# Patient Record
Sex: FEMALE | Race: WHITE | NOT HISPANIC OR LATINO | Employment: OTHER | URBAN - METROPOLITAN AREA
[De-identification: names, ages, dates, MRNs, and addresses within clinical notes are randomized per-mention and may not be internally consistent; named-entity substitution may affect disease eponyms.]

---

## 2017-02-28 ENCOUNTER — HOSPITAL ENCOUNTER (EMERGENCY)
Facility: HOSPITAL | Age: 52
Discharge: HOME | End: 2017-02-28
Attending: EMERGENCY MEDICINE | Admitting: EMERGENCY MEDICINE

## 2017-02-28 ENCOUNTER — APPOINTMENT (OUTPATIENT)
Dept: CT IMAGING | Facility: HOSPITAL | Age: 52
End: 2017-02-28

## 2017-02-28 VITALS
DIASTOLIC BLOOD PRESSURE: 86 MMHG | WEIGHT: 200 LBS | TEMPERATURE: 98.1 F | SYSTOLIC BLOOD PRESSURE: 147 MMHG | RESPIRATION RATE: 18 BRPM | HEART RATE: 81 BPM | BODY MASS INDEX: 33.32 KG/M2 | HEIGHT: 65 IN | OXYGEN SATURATION: 94 %

## 2017-02-28 DIAGNOSIS — R51.9 ACUTE NONINTRACTABLE HEADACHE, UNSPECIFIED HEADACHE TYPE: Primary | ICD-10-CM

## 2017-02-28 LAB — TROPONIN I SERPL-MCNC: 0 NG/ML (ref 0–0.07)

## 2017-02-28 PROCEDURE — 25010000002 METOCLOPRAMIDE PER 10 MG: Performed by: EMERGENCY MEDICINE

## 2017-02-28 PROCEDURE — 25010000002 HYDROMORPHONE PER 4 MG

## 2017-02-28 PROCEDURE — 84484 ASSAY OF TROPONIN QUANT: CPT

## 2017-02-28 PROCEDURE — 70450 CT HEAD/BRAIN W/O DYE: CPT

## 2017-02-28 PROCEDURE — 96375 TX/PRO/DX INJ NEW DRUG ADDON: CPT

## 2017-02-28 PROCEDURE — 96361 HYDRATE IV INFUSION ADD-ON: CPT

## 2017-02-28 PROCEDURE — 96374 THER/PROPH/DIAG INJ IV PUSH: CPT

## 2017-02-28 PROCEDURE — 99284 EMERGENCY DEPT VISIT MOD MDM: CPT

## 2017-02-28 PROCEDURE — 25010000002 DIPHENHYDRAMINE PER 50 MG: Performed by: EMERGENCY MEDICINE

## 2017-02-28 PROCEDURE — 25010000002 ONDANSETRON PER 1 MG: Performed by: EMERGENCY MEDICINE

## 2017-02-28 RX ORDER — METOCLOPRAMIDE HYDROCHLORIDE 5 MG/ML
10 INJECTION INTRAMUSCULAR; INTRAVENOUS ONCE
Status: COMPLETED | OUTPATIENT
Start: 2017-02-28 | End: 2017-02-28

## 2017-02-28 RX ORDER — SODIUM CHLORIDE 0.9 % (FLUSH) 0.9 %
10 SYRINGE (ML) INJECTION AS NEEDED
Status: DISCONTINUED | OUTPATIENT
Start: 2017-02-28 | End: 2017-03-01 | Stop reason: HOSPADM

## 2017-02-28 RX ORDER — HYDROMORPHONE HYDROCHLORIDE 1 MG/ML
0.5 INJECTION, SOLUTION INTRAMUSCULAR; INTRAVENOUS; SUBCUTANEOUS ONCE
Status: COMPLETED | OUTPATIENT
Start: 2017-02-28 | End: 2017-02-28

## 2017-02-28 RX ORDER — ONDANSETRON 2 MG/ML
4 INJECTION INTRAMUSCULAR; INTRAVENOUS ONCE
Status: COMPLETED | OUTPATIENT
Start: 2017-02-28 | End: 2017-02-28

## 2017-02-28 RX ORDER — BUTALBITAL, ACETAMINOPHEN AND CAFFEINE 50; 325; 40 MG/1; MG/1; MG/1
1 TABLET ORAL EVERY 6 HOURS PRN
Qty: 24 TABLET | Refills: 0 | Status: SHIPPED | OUTPATIENT
Start: 2017-02-28 | End: 2018-06-07 | Stop reason: HOSPADM

## 2017-02-28 RX ORDER — DIPHENHYDRAMINE HYDROCHLORIDE 50 MG/ML
25 INJECTION INTRAMUSCULAR; INTRAVENOUS ONCE
Status: COMPLETED | OUTPATIENT
Start: 2017-02-28 | End: 2017-02-28

## 2017-02-28 RX ORDER — ONDANSETRON 8 MG/1
8 TABLET, ORALLY DISINTEGRATING ORAL EVERY 8 HOURS PRN
Qty: 16 TABLET | Refills: 0 | Status: SHIPPED | OUTPATIENT
Start: 2017-02-28

## 2017-02-28 RX ADMIN — DIPHENHYDRAMINE HYDROCHLORIDE 25 MG: 50 INJECTION INTRAMUSCULAR; INTRAVENOUS at 21:00

## 2017-02-28 RX ADMIN — ONDANSETRON 4 MG: 2 INJECTION INTRAMUSCULAR; INTRAVENOUS at 22:40

## 2017-02-28 RX ADMIN — SODIUM CHLORIDE 1000 ML: 9 INJECTION, SOLUTION INTRAVENOUS at 21:00

## 2017-02-28 RX ADMIN — HYDROMORPHONE HYDROCHLORIDE 0.5 MG: 1 INJECTION, SOLUTION INTRAMUSCULAR; INTRAVENOUS; SUBCUTANEOUS at 22:40

## 2017-02-28 RX ADMIN — METOCLOPRAMIDE 10 MG: 5 INJECTION, SOLUTION INTRAMUSCULAR; INTRAVENOUS at 21:02

## 2017-03-01 LAB
HOLD SPECIMEN: NORMAL
HOLD SPECIMEN: NORMAL
WHOLE BLOOD HOLD SPECIMEN: NORMAL
WHOLE BLOOD HOLD SPECIMEN: NORMAL

## 2017-03-01 NOTE — ED PROVIDER NOTES
Subjective   HPI Comments: PT WITH HX OF MIGRAINES C/O GRADUAL WORSENING HEADACHE THAT STARTED THIS PM. PT STATES THAT IT STARTED MILD AT 6 PM WHICH THEN IMPROVED. HER BP STARTED TO INCREASE AFTER THE HEADACHE WORSENED. SHE STATES THAT SHE HAS HAD NAUSEA AND VOMITING AS WELL. PT DENIES ANY FEVER, CHILLS, NECK PAIN OR CO ALARMS GOING OFF AT HER HOME. HER  IS SYMPTOM FREE. SHE HAS NO OTHER COMPLAINTS.     Patient is a 51 y.o. female presenting with headaches.   History provided by:  Patient  Headache   Pain location:  L parietal  Quality:  Dull  Radiates to:  Does not radiate  Severity currently:  Unable to specify  Severity at highest:  Unable to specify  Onset quality:  Gradual  Timing:  Constant  Chronicity:  New  Context: not activity        Review of Systems   Neurological: Positive for headaches.   All other systems reviewed and are negative.      Past Medical History   Diagnosis Date   • Anxiety    • Arthritis    • Depression    • GERD (gastroesophageal reflux disease)    • History of transfusion      2 UNITS   • Hypertension    • Migraine    • PONV (postoperative nausea and vomiting)    • Wears eyeglasses        No Known Allergies    Past Surgical History   Procedure Laterality Date   • Dilatation and curettage     • Hysterectomy     • Tonsillectomy     • Back surgery     • Rotator cuff repair       bilateral   • Gastric bypass     • Colonoscopy     • Endoscopy     • Total hip arthroplasty Left 10/4/2016     Procedure: TOTAL HIP ARTHROPLASTY LEFT;  Surgeon: Arvind Oh MD;  [facility];  Service:        History reviewed. No pertinent family history.    Social History     Social History   • Marital status:      Spouse name: N/A   • Number of children: N/A   • Years of education: N/A     Social History Main Topics   • Smoking status: Never Smoker   • Smokeless tobacco: Never Used   • Alcohol use No   • Drug use: No   • Sexual activity: Defer     Other Topics Concern   • None      Social History Narrative           Objective   Physical Exam   Constitutional: She appears well-developed and well-nourished.   HENT:   Head: Normocephalic and atraumatic.   Eyes: Conjunctivae are normal.   Neck: Normal range of motion. Neck supple.   Cardiovascular: Normal rate, regular rhythm and normal heart sounds.    Pulmonary/Chest: Effort normal and breath sounds normal. No respiratory distress.   Abdominal: Soft.   Musculoskeletal: Normal range of motion. She exhibits no edema.   Neurological: She is alert.   Skin: Skin is warm and dry.   Psychiatric: Her behavior is normal. Judgment and thought content normal.   Nursing note and vitals reviewed.      Procedures         ED Course  ED Course   Comment By Time   PT MUCH BETTER AFTER TREATMENT. PT AGREES WITH PLAN AND HAS NO OTHER QUESTION. MALIA Rodriguez 02/28 2225                No results found for this or any previous visit (from the past 24 hour(s)).  Note: In addition to lab results from this visit, the labs listed above may include labs taken at another facility or during a different encounter within the last 24 hours. Please correlate lab times with ED admission and discharge times for further clarification of the services performed during this visit.    CT Head Without Contrast   Final Result   Abnormal   1.  No acute abnormality.   2.  Mild right maxillary sinus disease, no acute sinusitis.      THIS DOCUMENT HAS BEEN ELECTRONICALLY SIGNED BY MAHAD SENIOR MD        Vitals:    02/28/17 2028 02/28/17 2205 02/28/17 2214 02/28/17 2246   BP: (!) 166/116 146/95  147/86   BP Location: Right arm   Right arm   Patient Position: Lying   Lying   Pulse:   80 81   Resp:   17 18   Temp:       TempSrc:       SpO2:   96% 94%   Weight:       Height:         Medications   sodium chloride 0.9 % bolus 1,000 mL (0 mL Intravenous Stopped 2/28/17 2246)   metoclopramide (REGLAN) injection 10 mg (10 mg Intravenous Given 2/28/17 2102)   diphenhydrAMINE (BENADRYL)  injection 25 mg (25 mg Intravenous Given 2/28/17 2100)   HYDROmorphone (DILAUDID) injection 0.5 mg (0.5 mg Intravenous Given 2/28/17 2240)   ondansetron (ZOFRAN) injection 4 mg (4 mg Intravenous Given 2/28/17 2240)     ECG/EMG Results (last 24 hours)     ** No results found for the last 24 hours. **          MDM    Final diagnoses:   Acute nonintractable headache, unspecified headache type            MALIA Rodriguez  02/28/17 8539       MALIA Rodriguez  03/14/17 7511       Marcial Kumar MD  03/16/17 7137

## 2017-04-24 ENCOUNTER — TRANSCRIBE ORDERS (OUTPATIENT)
Dept: ADMINISTRATIVE | Facility: HOSPITAL | Age: 52
End: 2017-04-24

## 2017-04-24 DIAGNOSIS — Z12.31 VISIT FOR SCREENING MAMMOGRAM: Primary | ICD-10-CM

## 2017-05-08 ENCOUNTER — HOSPITAL ENCOUNTER (OUTPATIENT)
Dept: MAMMOGRAPHY | Facility: HOSPITAL | Age: 52
Discharge: HOME | End: 2017-05-08
Attending: OBSTETRICS & GYNECOLOGY | Admitting: OBSTETRICS & GYNECOLOGY

## 2017-05-08 DIAGNOSIS — Z12.31 VISIT FOR SCREENING MAMMOGRAM: ICD-10-CM

## 2017-05-08 PROCEDURE — 77063 BREAST TOMOSYNTHESIS BI: CPT | Performed by: RADIOLOGY

## 2017-05-08 PROCEDURE — 77063 BREAST TOMOSYNTHESIS BI: CPT

## 2017-05-08 PROCEDURE — G0202 SCR MAMMO BI INCL CAD: HCPCS

## 2017-05-08 PROCEDURE — 77067 SCR MAMMO BI INCL CAD: CPT | Performed by: RADIOLOGY

## 2017-05-26 ENCOUNTER — HOSPITAL ENCOUNTER (OUTPATIENT)
Dept: MAMMOGRAPHY | Facility: HOSPITAL | Age: 52
Discharge: HOME | End: 2017-05-26
Admitting: OBSTETRICS & GYNECOLOGY

## 2017-05-26 ENCOUNTER — TRANSCRIBE ORDERS (OUTPATIENT)
Dept: MAMMOGRAPHY | Facility: HOSPITAL | Age: 52
End: 2017-05-26

## 2017-05-26 DIAGNOSIS — R92.8 ABNORMAL MAMMOGRAM: ICD-10-CM

## 2017-05-26 DIAGNOSIS — R92.8 ABNORMAL MAMMOGRAM: Primary | ICD-10-CM

## 2017-05-26 PROCEDURE — G0206 DX MAMMO INCL CAD UNI: HCPCS

## 2017-05-26 PROCEDURE — 77065 DX MAMMO INCL CAD UNI: CPT | Performed by: RADIOLOGY

## 2017-06-29 ENCOUNTER — HOSPITAL ENCOUNTER (OUTPATIENT)
Dept: MAMMOGRAPHY | Facility: HOSPITAL | Age: 52
Discharge: HOME | End: 2017-06-29

## 2017-06-29 ENCOUNTER — HOSPITAL ENCOUNTER (OUTPATIENT)
Dept: MAMMOGRAPHY | Facility: HOSPITAL | Age: 52
Discharge: HOME | End: 2017-06-29
Admitting: RADIOLOGY

## 2017-06-29 DIAGNOSIS — R92.8 ABNORMAL MAMMOGRAM: ICD-10-CM

## 2017-06-29 PROCEDURE — 76098 X-RAY EXAM SURGICAL SPECIMEN: CPT

## 2017-06-29 PROCEDURE — 77065 DX MAMMO INCL CAD UNI: CPT | Performed by: RADIOLOGY

## 2017-06-29 PROCEDURE — 88305 TISSUE EXAM BY PATHOLOGIST: CPT | Performed by: RADIOLOGY

## 2017-06-29 PROCEDURE — 19081 BX BREAST 1ST LESION STRTCTC: CPT | Performed by: RADIOLOGY

## 2017-06-29 RX ORDER — LIDOCAINE HYDROCHLORIDE 10 MG/ML
5 INJECTION, SOLUTION INFILTRATION; PERINEURAL ONCE
Status: COMPLETED | OUTPATIENT
Start: 2017-06-29 | End: 2017-06-29

## 2017-06-29 RX ORDER — LIDOCAINE HYDROCHLORIDE 10 MG/ML
5 INJECTION, SOLUTION INFILTRATION; PERINEURAL ONCE
Status: DISCONTINUED | OUTPATIENT
Start: 2017-06-29 | End: 2018-06-07 | Stop reason: HOSPADM

## 2017-06-29 RX ORDER — LIDOCAINE HYDROCHLORIDE AND EPINEPHRINE 10; 10 MG/ML; UG/ML
10 INJECTION, SOLUTION INFILTRATION; PERINEURAL ONCE
Status: DISCONTINUED | OUTPATIENT
Start: 2017-06-29 | End: 2018-06-07 | Stop reason: HOSPADM

## 2017-06-29 RX ORDER — LIDOCAINE HYDROCHLORIDE AND EPINEPHRINE 10; 10 MG/ML; UG/ML
10 INJECTION, SOLUTION INFILTRATION; PERINEURAL ONCE
Status: COMPLETED | OUTPATIENT
Start: 2017-06-29 | End: 2017-06-29

## 2017-06-29 RX ADMIN — LIDOCAINE HYDROCHLORIDE 4 ML: 10 INJECTION, SOLUTION INFILTRATION; PERINEURAL at 13:52

## 2017-06-29 RX ADMIN — LIDOCAINE HYDROCHLORIDE,EPINEPHRINE BITARTRATE 10 ML: 10; .01 INJECTION, SOLUTION INFILTRATION; PERINEURAL at 14:09

## 2017-06-30 LAB
CYTO UR: NORMAL
LAB AP CASE REPORT: NORMAL
LAB AP CLINICAL INFORMATION: NORMAL
LAB AP DIAGNOSIS COMMENT: NORMAL
Lab: NORMAL
PATH REPORT.FINAL DX SPEC: NORMAL
PATH REPORT.GROSS SPEC: NORMAL

## 2017-07-03 ENCOUNTER — TELEPHONE (OUTPATIENT)
Dept: MAMMOGRAPHY | Facility: HOSPITAL | Age: 52
End: 2017-07-03

## 2017-07-03 NOTE — TELEPHONE ENCOUNTER
07.03.17 @ 1525: Pathology results and recommendation given. Verbalizes understanding. Denies discomfort. Denies any signs and symptoms of infection.

## 2017-11-21 ENCOUNTER — TRANSCRIBE ORDERS (OUTPATIENT)
Dept: ADMINISTRATIVE | Facility: HOSPITAL | Age: 52
End: 2017-11-21

## 2017-11-21 DIAGNOSIS — Q79.60 EHLERS-DANLOS SYNDROME: Primary | ICD-10-CM

## 2017-11-22 ENCOUNTER — TRANSCRIBE ORDERS (OUTPATIENT)
Dept: MAMMOGRAPHY | Facility: HOSPITAL | Age: 52
End: 2017-11-22

## 2017-11-22 DIAGNOSIS — Z12.31 VISIT FOR SCREENING MAMMOGRAM: Primary | ICD-10-CM

## 2017-11-27 ENCOUNTER — OUTSIDE FACILITY SERVICE (OUTPATIENT)
Dept: GASTROENTEROLOGY | Facility: CLINIC | Age: 52
End: 2017-11-27

## 2017-11-27 PROCEDURE — 45378 DIAGNOSTIC COLONOSCOPY: CPT | Performed by: INTERNAL MEDICINE

## 2017-12-11 ENCOUNTER — HOSPITAL ENCOUNTER (OUTPATIENT)
Dept: CARDIOLOGY | Facility: HOSPITAL | Age: 52
Discharge: HOME | End: 2017-12-11
Admitting: INTERNAL MEDICINE

## 2017-12-11 VITALS — HEIGHT: 65 IN | BODY MASS INDEX: 33.32 KG/M2 | WEIGHT: 200 LBS

## 2017-12-11 DIAGNOSIS — Q79.60 EHLERS-DANLOS SYNDROME: ICD-10-CM

## 2017-12-11 LAB
BH CV ECHO MEAS - AO ROOT AREA (BSA CORRECTED): 1.6
BH CV ECHO MEAS - AO ROOT AREA: 8 CM^2
BH CV ECHO MEAS - AO ROOT DIAM: 3.2 CM
BH CV ECHO MEAS - BSA(HAYCOCK): 2.1 M^2
BH CV ECHO MEAS - BSA: 2 M^2
BH CV ECHO MEAS - BZI_BMI: 33.3 KILOGRAMS/M^2
BH CV ECHO MEAS - BZI_METRIC_HEIGHT: 165.1 CM
BH CV ECHO MEAS - BZI_METRIC_WEIGHT: 90.7 KG
BH CV ECHO MEAS - CONTRAST EF (2CH): 65.3 ML/M^2
BH CV ECHO MEAS - CONTRAST EF 4CH: 57.5 ML/M^2
BH CV ECHO MEAS - EDV(CUBED): 58 ML
BH CV ECHO MEAS - EDV(MOD-SP2): 101 ML
BH CV ECHO MEAS - EDV(MOD-SP4): 73 ML
BH CV ECHO MEAS - EDV(TEICH): 64.7 ML
BH CV ECHO MEAS - EF(CUBED): 80.1 %
BH CV ECHO MEAS - EF(MOD-SP2): 65.3 %
BH CV ECHO MEAS - EF(MOD-SP4): 57.5 %
BH CV ECHO MEAS - EF(TEICH): 73.2 %
BH CV ECHO MEAS - ESV(CUBED): 11.5 ML
BH CV ECHO MEAS - ESV(MOD-SP2): 35 ML
BH CV ECHO MEAS - ESV(MOD-SP4): 31 ML
BH CV ECHO MEAS - ESV(TEICH): 17.3 ML
BH CV ECHO MEAS - FS: 41.6 %
BH CV ECHO MEAS - IVS/LVPW: 1.1
BH CV ECHO MEAS - IVSD: 1 CM
BH CV ECHO MEAS - LA DIMENSION: 3.2 CM
BH CV ECHO MEAS - LA/AO: 1
BH CV ECHO MEAS - LAT PEAK E' VEL: 11 CM/SEC
BH CV ECHO MEAS - LV DIASTOLIC VOL/BSA (35-75): 36.9 ML/M^2
BH CV ECHO MEAS - LV MASS(C)D: 116.7 GRAMS
BH CV ECHO MEAS - LV MASS(C)DI: 59 GRAMS/M^2
BH CV ECHO MEAS - LV MAX PG: 2.3 MMHG
BH CV ECHO MEAS - LV MEAN PG: 1 MMHG
BH CV ECHO MEAS - LV SYSTOLIC VOL/BSA (12-30): 15.7 ML/M^2
BH CV ECHO MEAS - LV V1 MAX: 75.2 CM/SEC
BH CV ECHO MEAS - LV V1 MEAN: 46.5 CM/SEC
BH CV ECHO MEAS - LV V1 VTI: 15.7 CM
BH CV ECHO MEAS - LVIDD: 3.9 CM
BH CV ECHO MEAS - LVIDS: 2.3 CM
BH CV ECHO MEAS - LVLD AP2: 8.2 CM
BH CV ECHO MEAS - LVLD AP4: 8.3 CM
BH CV ECHO MEAS - LVLS AP2: 7.2 CM
BH CV ECHO MEAS - LVLS AP4: 7.3 CM
BH CV ECHO MEAS - LVOT AREA (M): 3.5 CM^2
BH CV ECHO MEAS - LVOT AREA: 3.5 CM^2
BH CV ECHO MEAS - LVOT DIAM: 2.1 CM
BH CV ECHO MEAS - LVPWD: 0.91 CM
BH CV ECHO MEAS - MED PEAK E' VEL: 6.69 CM/SEC
BH CV ECHO MEAS - MV A MAX VEL: 65.2 CM/SEC
BH CV ECHO MEAS - MV E MAX VEL: 92.3 CM/SEC
BH CV ECHO MEAS - MV E/A: 1.4
BH CV ECHO MEAS - PA ACC SLOPE: 427 CM/SEC^2
BH CV ECHO MEAS - PA ACC TIME: 0.17 SEC
BH CV ECHO MEAS - PA PR(ACCEL): 4.8 MMHG
BH CV ECHO MEAS - RAP SYSTOLE: 8 MMHG
BH CV ECHO MEAS - RVDD: 2.6 CM
BH CV ECHO MEAS - RVSP: 24 MMHG
BH CV ECHO MEAS - SI(CUBED): 23.5 ML/M^2
BH CV ECHO MEAS - SI(LVOT): 27.5 ML/M^2
BH CV ECHO MEAS - SI(MOD-SP2): 33.4 ML/M^2
BH CV ECHO MEAS - SI(MOD-SP4): 21.2 ML/M^2
BH CV ECHO MEAS - SI(TEICH): 23.9 ML/M^2
BH CV ECHO MEAS - SV(CUBED): 46.4 ML
BH CV ECHO MEAS - SV(LVOT): 54.4 ML
BH CV ECHO MEAS - SV(MOD-SP2): 66 ML
BH CV ECHO MEAS - SV(MOD-SP4): 42 ML
BH CV ECHO MEAS - SV(TEICH): 47.4 ML
BH CV ECHO MEAS - TR MAX VEL: 201.3 CM/SEC
BH CV VAS BP LEFT ARM: NORMAL MMHG
BH CV XLRA - RV BASE: 3.1 CM
BH CV XLRA - RV LENGTH: 5.6 CM
BH CV XLRA - RV MID: 2.1 CM
BH CV XLRA - TDI S': 12.5 CM/SEC
E/E' RATIO: 10
LEFT ATRIUM VOLUME INDEX: 16.7 ML/M2
LV EF 2D ECHO EST: 55 %
MAXIMAL PREDICTED HEART RATE: 167 BPM
STRESS TARGET HR: 142 BPM

## 2017-12-11 PROCEDURE — 93306 TTE W/DOPPLER COMPLETE: CPT | Performed by: INTERNAL MEDICINE

## 2017-12-11 PROCEDURE — 93306 TTE W/DOPPLER COMPLETE: CPT

## 2018-01-03 ENCOUNTER — TRANSCRIBE ORDERS (OUTPATIENT)
Dept: MAMMOGRAPHY | Facility: HOSPITAL | Age: 53
End: 2018-01-03

## 2018-01-03 DIAGNOSIS — R92.8 ABNORMAL MAMMOGRAM: Primary | ICD-10-CM

## 2018-01-18 ENCOUNTER — APPOINTMENT (OUTPATIENT)
Dept: MAMMOGRAPHY | Facility: HOSPITAL | Age: 53
End: 2018-01-18
Attending: OBSTETRICS & GYNECOLOGY

## 2018-05-22 ENCOUNTER — TRANSCRIBE ORDERS (OUTPATIENT)
Dept: ADMINISTRATIVE | Facility: HOSPITAL | Age: 53
End: 2018-05-22

## 2018-05-22 DIAGNOSIS — K80.20 GALLBLADDER CALCULUS WITHOUT CHOLECYSTITIS AND NO OBSTRUCTION: Primary | ICD-10-CM

## 2018-05-24 ENCOUNTER — HOSPITAL ENCOUNTER (OUTPATIENT)
Dept: ULTRASOUND IMAGING | Facility: HOSPITAL | Age: 53
Discharge: HOME | End: 2018-05-24
Attending: SURGERY | Admitting: SURGERY

## 2018-05-24 DIAGNOSIS — K80.20 GALLBLADDER CALCULUS WITHOUT CHOLECYSTITIS AND NO OBSTRUCTION: ICD-10-CM

## 2018-05-24 PROCEDURE — 76705 ECHO EXAM OF ABDOMEN: CPT

## 2018-06-05 ENCOUNTER — TRANSCRIBE ORDERS (OUTPATIENT)
Dept: LAB | Facility: HOSPITAL | Age: 53
End: 2018-06-05

## 2018-06-05 ENCOUNTER — APPOINTMENT (OUTPATIENT)
Dept: LAB | Facility: HOSPITAL | Age: 53
End: 2018-06-05

## 2018-06-05 DIAGNOSIS — Z01.812 PRE-OPERATIVE LABORATORY EXAMINATION: Primary | ICD-10-CM

## 2018-06-05 LAB
ALBUMIN SERPL-MCNC: 4.09 G/DL (ref 3.2–4.8)
ALBUMIN/GLOB SERPL: 1.7 G/DL (ref 1.5–2.5)
ALP SERPL-CCNC: 51 U/L (ref 25–100)
ALT SERPL W P-5'-P-CCNC: 18 U/L (ref 7–40)
ANION GAP SERPL CALCULATED.3IONS-SCNC: 8 MMOL/L (ref 3–11)
AST SERPL-CCNC: 21 U/L (ref 0–33)
BILIRUB SERPL-MCNC: 0.3 MG/DL (ref 0.3–1.2)
BUN BLD-MCNC: 11 MG/DL (ref 9–23)
BUN/CREAT SERPL: 14.5 (ref 7–25)
CALCIUM SPEC-SCNC: 8.7 MG/DL (ref 8.7–10.4)
CHLORIDE SERPL-SCNC: 105 MMOL/L (ref 99–109)
CO2 SERPL-SCNC: 26 MMOL/L (ref 20–31)
CREAT BLD-MCNC: 0.76 MG/DL (ref 0.6–1.3)
GFR SERPL CREATININE-BSD FRML MDRD: 80 ML/MIN/1.73
GLOBULIN UR ELPH-MCNC: 2.4 GM/DL
GLUCOSE BLD-MCNC: 94 MG/DL (ref 70–100)
POTASSIUM BLD-SCNC: 3.9 MMOL/L (ref 3.5–5.5)
PROT SERPL-MCNC: 6.5 G/DL (ref 5.7–8.2)
SODIUM BLD-SCNC: 139 MMOL/L (ref 132–146)

## 2018-06-05 PROCEDURE — 80053 COMPREHEN METABOLIC PANEL: CPT | Performed by: SURGERY

## 2018-06-05 PROCEDURE — 36415 COLL VENOUS BLD VENIPUNCTURE: CPT | Performed by: SURGERY

## 2018-06-07 ENCOUNTER — ANESTHESIA EVENT (OUTPATIENT)
Dept: PERIOP | Facility: HOSPITAL | Age: 53
End: 2018-06-07

## 2018-06-07 ENCOUNTER — ANESTHESIA (OUTPATIENT)
Dept: PERIOP | Facility: HOSPITAL | Age: 53
End: 2018-06-07

## 2018-06-07 ENCOUNTER — HOSPITAL ENCOUNTER (OUTPATIENT)
Facility: HOSPITAL | Age: 53
Setting detail: HOSPITAL OUTPATIENT SURGERY
Discharge: HOME | End: 2018-06-07
Attending: SURGERY | Admitting: SURGERY

## 2018-06-07 VITALS
DIASTOLIC BLOOD PRESSURE: 72 MMHG | HEART RATE: 65 BPM | SYSTOLIC BLOOD PRESSURE: 124 MMHG | WEIGHT: 178 LBS | RESPIRATION RATE: 20 BRPM | OXYGEN SATURATION: 99 % | BODY MASS INDEX: 29.66 KG/M2 | HEIGHT: 65 IN | TEMPERATURE: 97.7 F

## 2018-06-07 DIAGNOSIS — K80.20 CHOLELITHIASIS: ICD-10-CM

## 2018-06-07 PROCEDURE — 25010000003 CEFAZOLIN IN DEXTROSE 2-4 GM/100ML-% SOLUTION: Performed by: SURGERY

## 2018-06-07 PROCEDURE — 25010000002 PHENYLEPHRINE PER 1 ML: Performed by: NURSE ANESTHETIST, CERTIFIED REGISTERED

## 2018-06-07 PROCEDURE — 25010000002 NEOSTIGMINE PER 0.5 MG: Performed by: NURSE ANESTHETIST, CERTIFIED REGISTERED

## 2018-06-07 PROCEDURE — 88304 TISSUE EXAM BY PATHOLOGIST: CPT | Performed by: SURGERY

## 2018-06-07 PROCEDURE — 25010000002 DEXAMETHASONE PER 1 MG: Performed by: NURSE ANESTHETIST, CERTIFIED REGISTERED

## 2018-06-07 PROCEDURE — 25010000002 KETOROLAC TROMETHAMINE PER 15 MG: Performed by: NURSE ANESTHETIST, CERTIFIED REGISTERED

## 2018-06-07 PROCEDURE — 25010000002 PROPOFOL 10 MG/ML EMULSION: Performed by: NURSE ANESTHETIST, CERTIFIED REGISTERED

## 2018-06-07 PROCEDURE — 25010000002 FENTANYL CITRATE (PF) 100 MCG/2ML SOLUTION: Performed by: NURSE ANESTHETIST, CERTIFIED REGISTERED

## 2018-06-07 PROCEDURE — 25010000002 PROPOFOL 1000 MG/ML EMULSION: Performed by: NURSE ANESTHETIST, CERTIFIED REGISTERED

## 2018-06-07 RX ORDER — LIDOCAINE HYDROCHLORIDE 10 MG/ML
INJECTION, SOLUTION INFILTRATION; PERINEURAL AS NEEDED
Status: DISCONTINUED | OUTPATIENT
Start: 2018-06-07 | End: 2018-06-07 | Stop reason: SURG

## 2018-06-07 RX ORDER — LIDOCAINE HYDROCHLORIDE 40 MG/ML
SOLUTION TOPICAL AS NEEDED
Status: DISCONTINUED | OUTPATIENT
Start: 2018-06-07 | End: 2018-06-07 | Stop reason: SURG

## 2018-06-07 RX ORDER — HYDROCODONE BITARTRATE AND ACETAMINOPHEN 10; 325 MG/1; MG/1
1 TABLET ORAL EVERY 8 HOURS
COMMUNITY
End: 2018-12-10 | Stop reason: SDUPTHER

## 2018-06-07 RX ORDER — PROPOFOL 10 MG/ML
VIAL (ML) INTRAVENOUS AS NEEDED
Status: DISCONTINUED | OUTPATIENT
Start: 2018-06-07 | End: 2018-06-07 | Stop reason: SURG

## 2018-06-07 RX ORDER — TOPIRAMATE 50 MG/1
50 TABLET, FILM COATED ORAL 2 TIMES DAILY
COMMUNITY
End: 2021-08-31 | Stop reason: SDUPTHER

## 2018-06-07 RX ORDER — GLYCOPYRROLATE 0.2 MG/ML
INJECTION INTRAMUSCULAR; INTRAVENOUS AS NEEDED
Status: DISCONTINUED | OUTPATIENT
Start: 2018-06-07 | End: 2018-06-07 | Stop reason: SURG

## 2018-06-07 RX ORDER — DEXAMETHASONE SODIUM PHOSPHATE 4 MG/ML
INJECTION, SOLUTION INTRA-ARTICULAR; INTRALESIONAL; INTRAMUSCULAR; INTRAVENOUS; SOFT TISSUE AS NEEDED
Status: DISCONTINUED | OUTPATIENT
Start: 2018-06-07 | End: 2018-06-07 | Stop reason: SURG

## 2018-06-07 RX ORDER — METHOCARBAMOL 500 MG/1
500 TABLET, FILM COATED ORAL 3 TIMES DAILY PRN
COMMUNITY
End: 2018-12-10

## 2018-06-07 RX ORDER — MEPERIDINE HYDROCHLORIDE 25 MG/ML
12.5 INJECTION INTRAMUSCULAR; INTRAVENOUS; SUBCUTANEOUS
Status: DISCONTINUED | OUTPATIENT
Start: 2018-06-07 | End: 2018-06-07 | Stop reason: HOSPADM

## 2018-06-07 RX ORDER — SODIUM CHLORIDE 0.9 % (FLUSH) 0.9 %
1-10 SYRINGE (ML) INJECTION AS NEEDED
Status: DISCONTINUED | OUTPATIENT
Start: 2018-06-07 | End: 2018-06-07 | Stop reason: HOSPADM

## 2018-06-07 RX ORDER — PROMETHAZINE HYDROCHLORIDE 25 MG/ML
6.25 INJECTION, SOLUTION INTRAMUSCULAR; INTRAVENOUS ONCE AS NEEDED
Status: DISCONTINUED | OUTPATIENT
Start: 2018-06-07 | End: 2018-06-07 | Stop reason: HOSPADM

## 2018-06-07 RX ORDER — LIDOCAINE HYDROCHLORIDE 10 MG/ML
0.5 INJECTION, SOLUTION EPIDURAL; INFILTRATION; INTRACAUDAL; PERINEURAL ONCE AS NEEDED
Status: COMPLETED | OUTPATIENT
Start: 2018-06-07 | End: 2018-06-07

## 2018-06-07 RX ORDER — DIPHENHYDRAMINE HYDROCHLORIDE 25 MG/1
10000 TABLET ORAL DAILY
COMMUNITY

## 2018-06-07 RX ORDER — ATRACURIUM BESYLATE 10 MG/ML
INJECTION, SOLUTION INTRAVENOUS AS NEEDED
Status: DISCONTINUED | OUTPATIENT
Start: 2018-06-07 | End: 2018-06-07 | Stop reason: SURG

## 2018-06-07 RX ORDER — CEFAZOLIN SODIUM 2 G/100ML
2 INJECTION, SOLUTION INTRAVENOUS ONCE
Status: COMPLETED | OUTPATIENT
Start: 2018-06-07 | End: 2018-06-07

## 2018-06-07 RX ORDER — SODIUM CHLORIDE, SODIUM LACTATE, POTASSIUM CHLORIDE, CALCIUM CHLORIDE 600; 310; 30; 20 MG/100ML; MG/100ML; MG/100ML; MG/100ML
9 INJECTION, SOLUTION INTRAVENOUS CONTINUOUS PRN
Status: DISCONTINUED | OUTPATIENT
Start: 2018-06-07 | End: 2018-06-07 | Stop reason: HOSPADM

## 2018-06-07 RX ORDER — BUPIVACAINE HYDROCHLORIDE AND EPINEPHRINE 5; 5 MG/ML; UG/ML
INJECTION, SOLUTION PERINEURAL AS NEEDED
Status: DISCONTINUED | OUTPATIENT
Start: 2018-06-07 | End: 2018-06-07 | Stop reason: HOSPADM

## 2018-06-07 RX ORDER — HYDROMORPHONE HYDROCHLORIDE 1 MG/ML
0.5 INJECTION, SOLUTION INTRAMUSCULAR; INTRAVENOUS; SUBCUTANEOUS
Status: DISCONTINUED | OUTPATIENT
Start: 2018-06-07 | End: 2018-06-07 | Stop reason: HOSPADM

## 2018-06-07 RX ORDER — RIZATRIPTAN BENZOATE 10 MG/1
10 TABLET ORAL ONCE AS NEEDED
COMMUNITY

## 2018-06-07 RX ORDER — MAGNESIUM HYDROXIDE 1200 MG/15ML
LIQUID ORAL AS NEEDED
Status: DISCONTINUED | OUTPATIENT
Start: 2018-06-07 | End: 2018-06-07 | Stop reason: HOSPADM

## 2018-06-07 RX ORDER — PROMETHAZINE HYDROCHLORIDE 25 MG/1
25 SUPPOSITORY RECTAL ONCE AS NEEDED
Status: DISCONTINUED | OUTPATIENT
Start: 2018-06-07 | End: 2018-06-07 | Stop reason: HOSPADM

## 2018-06-07 RX ORDER — PROMETHAZINE HYDROCHLORIDE 25 MG/1
25 TABLET ORAL ONCE AS NEEDED
Status: DISCONTINUED | OUTPATIENT
Start: 2018-06-07 | End: 2018-06-07 | Stop reason: HOSPADM

## 2018-06-07 RX ORDER — KETOROLAC TROMETHAMINE 30 MG/ML
INJECTION, SOLUTION INTRAMUSCULAR; INTRAVENOUS AS NEEDED
Status: DISCONTINUED | OUTPATIENT
Start: 2018-06-07 | End: 2018-06-07 | Stop reason: SURG

## 2018-06-07 RX ORDER — FENTANYL CITRATE 50 UG/ML
50 INJECTION, SOLUTION INTRAMUSCULAR; INTRAVENOUS
Status: DISCONTINUED | OUTPATIENT
Start: 2018-06-07 | End: 2018-06-07 | Stop reason: HOSPADM

## 2018-06-07 RX ORDER — SODIUM CHLORIDE 9 MG/ML
INJECTION, SOLUTION INTRAVENOUS AS NEEDED
Status: DISCONTINUED | OUTPATIENT
Start: 2018-06-07 | End: 2018-06-07 | Stop reason: HOSPADM

## 2018-06-07 RX ORDER — FAMOTIDINE 20 MG/1
20 TABLET, FILM COATED ORAL
Status: DISCONTINUED | OUTPATIENT
Start: 2018-06-07 | End: 2018-06-07 | Stop reason: HOSPADM

## 2018-06-07 RX ORDER — MINOCYCLINE HYDROCHLORIDE 100 MG/1
100 CAPSULE ORAL 2 TIMES DAILY
COMMUNITY
End: 2019-12-23 | Stop reason: HOSPADM

## 2018-06-07 RX ORDER — FENTANYL CITRATE 50 UG/ML
INJECTION, SOLUTION INTRAMUSCULAR; INTRAVENOUS AS NEEDED
Status: DISCONTINUED | OUTPATIENT
Start: 2018-06-07 | End: 2018-06-07 | Stop reason: SURG

## 2018-06-07 RX ORDER — DIPHENHYDRAMINE HCL 25 MG
25 CAPSULE ORAL NIGHTLY PRN
COMMUNITY

## 2018-06-07 RX ORDER — MELATONIN 10 MG
10 CAPSULE ORAL NIGHTLY
COMMUNITY
End: 2020-02-07

## 2018-06-07 RX ORDER — LORATADINE 10 MG/1
10 TABLET ORAL DAILY
COMMUNITY
End: 2020-02-07

## 2018-06-07 RX ADMIN — ATRACURIUM BESYLATE 35 MG: 10 INJECTION, SOLUTION INTRAVENOUS at 07:35

## 2018-06-07 RX ADMIN — DEXAMETHASONE SODIUM PHOSPHATE 8 MG: 4 INJECTION, SOLUTION INTRAMUSCULAR; INTRAVENOUS at 07:41

## 2018-06-07 RX ADMIN — Medication 3 MG: at 08:11

## 2018-06-07 RX ADMIN — SODIUM CHLORIDE, POTASSIUM CHLORIDE, SODIUM LACTATE AND CALCIUM CHLORIDE 9 ML/HR: 600; 310; 30; 20 INJECTION, SOLUTION INTRAVENOUS at 07:21

## 2018-06-07 RX ADMIN — KETOROLAC TROMETHAMINE 15 MG: 30 INJECTION, SOLUTION INTRAMUSCULAR at 08:15

## 2018-06-07 RX ADMIN — LIDOCAINE HYDROCHLORIDE 0.5 ML: 10 INJECTION, SOLUTION EPIDURAL; INFILTRATION; INTRACAUDAL; PERINEURAL at 07:21

## 2018-06-07 RX ADMIN — SODIUM CHLORIDE, POTASSIUM CHLORIDE, SODIUM LACTATE AND CALCIUM CHLORIDE: 600; 310; 30; 20 INJECTION, SOLUTION INTRAVENOUS at 08:29

## 2018-06-07 RX ADMIN — FENTANYL CITRATE 50 MCG: 50 INJECTION, SOLUTION INTRAMUSCULAR; INTRAVENOUS at 08:27

## 2018-06-07 RX ADMIN — LIDOCAINE HYDROCHLORIDE 50 MG: 10 INJECTION, SOLUTION INFILTRATION; PERINEURAL at 07:35

## 2018-06-07 RX ADMIN — PROPOFOL 50 MCG/KG/MIN: 10 INJECTION, EMULSION INTRAVENOUS at 07:41

## 2018-06-07 RX ADMIN — PHENYLEPHRINE HYDROCHLORIDE 80 MCG: 10 INJECTION INTRAVENOUS at 07:49

## 2018-06-07 RX ADMIN — EPHEDRINE SULFATE 5 MG: 50 INJECTION INTRAMUSCULAR; INTRAVENOUS; SUBCUTANEOUS at 07:49

## 2018-06-07 RX ADMIN — FENTANYL CITRATE 50 MCG: 50 INJECTION, SOLUTION INTRAMUSCULAR; INTRAVENOUS at 07:35

## 2018-06-07 RX ADMIN — PROPOFOL 150 MG: 10 INJECTION, EMULSION INTRAVENOUS at 07:35

## 2018-06-07 RX ADMIN — PHENYLEPHRINE HYDROCHLORIDE 80 MCG: 10 INJECTION INTRAVENOUS at 07:45

## 2018-06-07 RX ADMIN — PHENYLEPHRINE HYDROCHLORIDE 80 MCG: 10 INJECTION INTRAVENOUS at 07:40

## 2018-06-07 RX ADMIN — CEFAZOLIN SODIUM 2 G: 2 INJECTION, SOLUTION INTRAVENOUS at 07:29

## 2018-06-07 RX ADMIN — FAMOTIDINE 20 MG: 20 TABLET ORAL at 07:22

## 2018-06-07 RX ADMIN — GLYCOPYRROLATE 0.4 MG: 0.2 INJECTION, SOLUTION INTRAMUSCULAR; INTRAVENOUS at 08:11

## 2018-06-07 RX ADMIN — LIDOCAINE HYDROCHLORIDE 1 EACH: 40 SOLUTION TOPICAL at 07:37

## 2018-06-07 NOTE — ANESTHESIA PROCEDURE NOTES
Airway  Urgency: elective    Date/Time: 6/7/2018 7:37 AM  Airway not difficult    General Information and Staff    Patient location during procedure: OR  Anesthesiologist: THEODORE REYES  CRNA: JUNIOR HERNANDEZ    Indications and Patient Condition  Indications for airway management: airway protection    Preoxygenated: yes  MILS not maintained throughout  Mask difficulty assessment: 1 - vent by mask    Final Airway Details  Final airway type: endotracheal airway      Successful airway: ETT  Cuffed: yes   Successful intubation technique: direct laryngoscopy  Endotracheal tube insertion site: oral  Blade: Leila  Blade size: #3  ETT size: 7.0 mm  Cormack-Lehane Classification: grade IIa - partial view of glottis  Placement verified by: chest auscultation and capnometry   Measured from: lips  ETT to lips (cm): 20  Number of attempts at approach: 1    Additional Comments  Negative epigastric sounds, Breath sound equal bilaterally with symmetric chest rise and fall

## 2018-06-07 NOTE — BRIEF OP NOTE
CHOLECYSTECTOMY LAPAROSCOPIC POSSIBLE OPEN CHOLECYSTECTOMY  Progress Note    Yumiko Oswald  6/7/2018    Pre-op Diagnosis:   cholelithiasis       Post-Op Diagnosis Codes:     * Cholelithiasis and cholecystitis without obstruction [K80.10]    Procedure/CPT® Codes:      Procedure(s):  CHOLECYSTECTOMY LAPAROSCOPIC    Surgeon(s):  Romana Torre MD    Anesthesia: General    Staff:   Circulator: Tanya Aldana RN; Bo Hutchison RN  Scrub Person: Luisana Galan  Assistant: MALIA Walters    Estimated Blood Loss: minimal    Urine Voided: * No values recorded between 6/7/2018  7:29 AM and 6/7/2018  8:16 AM *    Specimens:                ID Type Source Tests Collected by Time   A :  Tissue Gallbladder TISSUE PATHOLOGY EXAM Romana Torre MD 6/7/2018 0754         Findings: distended GB    Complications: none      Romana Torre MD     Date: 6/7/2018  Time: 8:16 AM

## 2018-06-07 NOTE — INTERVAL H&P NOTE
Pre-Op H&P (See Recent Office Note Attached for Full H&P)    Chief complaint: Right upper quadrant pain    HPI:      Patient is a 52 y.o. female who presents with history of pain in the right upper quadrant and fatty food intolerance.    Review of Systems:  General ROS:  no fever, chills, rashes, No change since last office visit  Cardiovascular ROS: no chest pain or dyspnea on exertion  Respiratory ROS: no cough, shortness of breath, or wheezing    Immunization History:   Influenza:  2017  Pneumococcal:  denies  Tetanus:  <10 years    Meds:    No current facility-administered medications on file prior to encounter.      Current Outpatient Prescriptions on File Prior to Encounter   Medication Sig Dispense Refill   • ALPRAZolam (XANAX) 0.5 MG tablet Take 0.5 mg by mouth daily as needed for anxiety.     • ARIPiprazole (ABILIFY) 5 MG tablet Take 5 mg by mouth daily.     • aspirin  MG EC tablet Take 1 tablet by mouth Daily. For 30 days 30 tablet 0   • busPIRone (BUSPAR) 10 MG tablet Take 30 mg by mouth 2 (two) times a day.     • butalbital-acetaminophen-caffeine (FIORICET, ESGIC) -40 MG per tablet Take 1 tablet by mouth Every 6 (Six) Hours As Needed for headaches. 24 tablet 0   • Cyanocobalamin (VITAMIN B-12 IJ) Inject 1,000 mcg as directed every 30 (thirty) days.     • desvenlafaxine (PRISTIQ) 50 MG 24 hr tablet Take 50 mg by mouth daily.     • dexlansoprazole (DEXILANT) 60 MG capsule Take 60 mg by mouth daily.     • docusate sodium (COLACE) 100 MG capsule Take 100 mg by mouth 2 (two) times a day.     • docusate sodium (COLACE) 100 MG capsule Take 1 capsule by mouth 2 (Two) Times a Day. 60 capsule 0   • fexofenadine (ALLEGRA) 180 MG tablet Take 180 mg by mouth daily.     • gabapentin (NEURONTIN) 600 MG tablet Take 600 mg by mouth every night.     • morphine (MSIR) 15 MG tablet Take 15 mg by mouth every night.     • ondansetron ODT (ZOFRAN-ODT) 8 MG disintegrating tablet Take 1 tablet by mouth Every 8  (Eight) Hours As Needed for nausea or vomiting. 16 tablet 0   • oseltamivir (TAMIFLU) 75 MG capsule Take 1 capsule by mouth Daily x10 days. 10 capsule 0   • oxyCODONE-acetaminophen (PERCOCET)  MG per tablet Take 1 tablet by mouth Every 6 (Six) Hours As Needed for moderate pain (4-6). 40 tablet 0   • promethazine (PHENERGAN) 25 MG tablet Take 25 mg by mouth every 6 (six) hours as needed for nausea or vomiting.         Physical Exam:    CV:  S1S2 regular rate and rhythm, no murmur               Resp:  Clear to auscultation; respirations regular, even and unlabored      Cancer Staging (if applicable)  Cancer Patient: __ yes _x_no __unknown; If yes, clinical stage T:__ N:__M:__, stage group or __N/A    Assessment: Cholelithiasis without cholecystitis or obstruction.    Plan: Laparoscopic cholecystectomy possible open.      MALIA Santo  6/7/2018   7:00 AM

## 2018-06-07 NOTE — ANESTHESIA PREPROCEDURE EVALUATION
Anesthesia Evaluation     Patient summary reviewed and Nursing notes reviewed   history of anesthetic complications: PONV  NPO Solid Status: > 8 hours  NPO Liquid Status: > 2 hours           Airway   Mallampati: I  TM distance: >3 FB  Neck ROM: full  No difficulty expected  Dental - normal exam     Pulmonary     breath sounds clear to auscultation  Cardiovascular   Exercise tolerance: good (4-7 METS)    Rhythm: regular  Rate: normal    (+) hypertension well controlled, valvular problems/murmurs MR and TI,       Neuro/Psych  (+) headaches, psychiatric history Depression,     GI/Hepatic/Renal/Endo    (+)  GERD well controlled, GI bleeding,     Musculoskeletal     Abdominal   (+) obese,     Abdomen: soft.   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    Plan discussed with CRNA.

## 2018-06-07 NOTE — ANESTHESIA POSTPROCEDURE EVALUATION
Patient: Yumiko Oswald    Procedure Summary     Date:  06/07/18 Room / Location:   [facility] [address] [place] [address] [facility]    Anesthesia Start:  0729 Anesthesia Stop:      Procedure:  CHOLECYSTECTOMY LAPAROSCOPIC (N/A Abdomen) Diagnosis:  Cholelithiasis and cholecystitis without obstruction    Surgeon:  Romana Torre MD Provider:  Carlton Plascencia MD    Anesthesia Type:  general ASA Status:  2          Anesthesia Type: general  Last vitals  /75   Temp 97.7   Pulse 86   Resp 16   SpO2 100     Post Anesthesia Care and Evaluation    Patient location during evaluation: PACU  Patient participation: complete - patient participated  Level of consciousness: awake and alert  Pain score: 0  Pain management: adequate  Airway patency: patent  Anesthetic complications: No anesthetic complications  PONV Status: none  Cardiovascular status: hemodynamically stable and acceptable  Respiratory status: nonlabored ventilation, acceptable and nasal cannula  Hydration status: acceptable

## 2018-06-08 LAB
CYTO UR: NORMAL
LAB AP CASE REPORT: NORMAL
LAB AP CLINICAL INFORMATION: NORMAL
Lab: NORMAL
PATH REPORT.FINAL DX SPEC: NORMAL
PATH REPORT.GROSS SPEC: NORMAL

## 2018-12-10 ENCOUNTER — OFFICE VISIT (OUTPATIENT)
Dept: ORTHOPEDIC SURGERY | Facility: CLINIC | Age: 53
End: 2018-12-10

## 2018-12-10 VITALS — HEIGHT: 65 IN | HEART RATE: 109 BPM | OXYGEN SATURATION: 75 % | WEIGHT: 185 LBS | BODY MASS INDEX: 30.82 KG/M2

## 2018-12-10 DIAGNOSIS — Z96.642 H/O TOTAL HIP ARTHROPLASTY, LEFT: Primary | ICD-10-CM

## 2018-12-10 DIAGNOSIS — Z09 POSTOPERATIVE EXAMINATION: ICD-10-CM

## 2018-12-10 PROCEDURE — 99203 OFFICE O/P NEW LOW 30 MIN: CPT | Performed by: ORTHOPAEDIC SURGERY

## 2018-12-10 RX ORDER — MULTIPLE VITAMINS W/ MINERALS TAB 9MG-400MCG
1 TAB ORAL DAILY
COMMUNITY

## 2018-12-10 NOTE — PROGRESS NOTES
[facility] Note    Subjective     Chief Complaint   Patient presents with   • Left Hip - Pain     2 yr s/p (L) GODWIN 10/04/2016- Dr Oh        HPI    Yumiko Oswald is a 53 y.o. female.  She presents today for evaluation of her left hip.  She does have some posterior pain on occasion.  Otherwise, compared to her preoperative symptoms, she's had good relief.  Total hip replacement with Dr. Martin in October 2016.  No fevers, chills or night sweats.      Patient Active Problem List   Diagnosis   • Pain of left hip joint   • Status post total replacement of left hip   • HTN (hypertension)   • Anxiety   • Hip arthritis     Past Medical History:   Diagnosis Date   • Anxiety    • Arthritis    • Depression    • Kimberly-Danlos disease    • GERD (gastroesophageal reflux disease)    • GERD (gastroesophageal reflux disease)    • GI bleed    • History of transfusion     2 UNITS   • Hypertension    • Migraine    • PONV (postoperative nausea and vomiting)    • Restless legs    • Wears eyeglasses       Past Surgical History:   Procedure Laterality Date   • BACK SURGERY     • COLONOSCOPY     • DILATATION AND CURETTAGE     • ENDOSCOPY     • GASTRIC BYPASS  2002   • HYSTERECTOMY     • ROTATOR CUFF REPAIR      bilateral   • TONSILLECTOMY     • TOTAL HIP ARTHROPLASTY Left       Family History   Problem Relation Age of Onset   • Breast cancer Maternal Grandmother 50   • Ovarian cancer Neg Hx      Social History     Socioeconomic History   • Marital status:      Spouse name: Not on file   • Number of children: Not on file   • Years of education: Not on file   • Highest education level: Not on file   Social Needs   • Financial resource strain: Not on file   • Food insecurity - worry: Not on file   • Food insecurity - inability: Not on file   • Transportation needs - medical: Not on file   • Transportation needs - non-medical: Not on file   Occupational History   • Not on file   Tobacco Use   • Smoking  status: Never Smoker   • Smokeless tobacco: Never Used   Substance and Sexual Activity   • Alcohol use: No   • Drug use: No   • Sexual activity: Defer     Birth control/protection: Post-menopausal   Other Topics Concern   • Not on file   Social History Narrative   • Not on file      Current Outpatient Medications on File Prior to Visit   Medication Sig Dispense Refill   • ALPRAZolam (XANAX) 0.5 MG tablet Take 0.5 mg by mouth daily as needed for anxiety.     • Biotin (BIOTIN 5000) 5 MG capsule Take 1 capsule by mouth.     • Cyanocobalamin (VITAMIN B-12 IJ) Inject 1,000 mcg as directed every 30 (thirty) days.     • desvenlafaxine (PRISTIQ) 50 MG 24 hr tablet Take 50 mg by mouth daily.     • diclofenac (VOLTAREN) 1 % gel gel Apply 4 g topically 2 (Two) Times a Day As Needed.     • diphenhydrAMINE (BENADRYL) 25 mg capsule Take 25 mg by mouth 3 (Three) Times a Day As Needed for Itching.     • gabapentin (NEURONTIN) 600 MG tablet Take 300 mg by mouth Every Night.     • hepatitis A (HAVRIX) 1440 EL U/ML vaccine Inject 1 mL into the appropriate muscle as directed by prescriber now.  Repeat in 6 months. 1 mL 1   • HYDROcodone-acetaminophen (NORCO)  MG per tablet Take 1 tablet by mouth 3 (Three) Times a Day As Needed for pain 90 tablet 0   • IRON PO Take 30 mg by mouth.     • loratadine (CLARITIN) 10 MG tablet Take 10 mg by mouth Daily.     • Melatonin 10 MG capsule Take 10 mg by mouth Every Night.     • minocycline (MINOCIN,DYNACIN) 100 MG capsule Take 100 mg by mouth 2 (Two) Times a Day.     • Morphine (MS CONTIN) 15 MG 12 hr tablet Take 1 tablet by mouth every night at bedtime for pain 30 tablet 0   • Multiple Vitamins-Minerals (MULTIVITAMIN WITH MINERALS) tablet tablet Take 1 tablet by mouth Daily.     • NON FORMULARY 3 capsules At Night As Needed. [employer] Restful Legs     • ondansetron ODT (ZOFRAN-ODT) 8 MG disintegrating tablet Take 1 tablet by mouth Every 8 (Eight) Hours As Needed for nausea or vomiting. 16  tablet 0   • Probiotic Product (PROBIOTIC DAILY PO) Take  by mouth.     • promethazine (PHENERGAN) 25 MG tablet Take 25 mg by mouth every 6 (six) hours as needed for nausea or vomiting.     • RaNITidine HCl (ZANTAC PO) Take 1-2 tablets by mouth 3 (Three) Times a Day As Needed.     • rizatriptan (MAXALT) 10 MG tablet Take 10 mg by mouth 1 (One) Time As Needed for Migraine. May repeat in 2 hours if needed     • topiramate (TOPAMAX) 50 MG tablet Take 50 mg by mouth 2 (Two) Times a Day.     • [DISCONTINUED] HYDROcodone-acetaminophen (NORCO)  MG per tablet Take 1 tablet by mouth Every 8 (Eight) Hours.     • [DISCONTINUED] methocarbamol (ROBAXIN) 500 MG tablet Take 500 mg by mouth 3 (Three) Times a Day As Needed for Muscle Spasms.     • [DISCONTINUED] morphine (MSIR) 15 MG tablet Take 15 mg by mouth every night.       No current facility-administered medications on file prior to visit.       Allergies   Allergen Reactions   • Ultram [Tramadol] Itching     Severe itching        Review of Systems   Constitutional: Positive for activity change and fatigue. Negative for appetite change, chills, diaphoresis, fever and unexpected weight change.   HENT: Positive for tinnitus. Negative for congestion, dental problem, drooling, ear discharge, ear pain, facial swelling, hearing loss, mouth sores, nosebleeds, postnasal drip, rhinorrhea, sinus pressure, sneezing, sore throat, trouble swallowing and voice change.    Eyes: Negative for photophobia, pain, discharge, redness, itching and visual disturbance.   Respiratory: Negative for apnea, cough, choking, chest tightness, shortness of breath, wheezing and stridor.    Cardiovascular: Negative for chest pain, palpitations and leg swelling.   Gastrointestinal: Positive for constipation, diarrhea and nausea. Negative for abdominal distention, abdominal pain, anal bleeding, blood in stool, rectal pain and vomiting.   Endocrine: Positive for cold intolerance. Negative for heat  "intolerance, polydipsia, polyphagia and polyuria.   Genitourinary: Positive for dysuria. Negative for decreased urine volume, difficulty urinating, enuresis, flank pain, frequency, genital sores, hematuria and urgency.   Musculoskeletal: Positive for back pain, gait problem, neck pain and neck stiffness. Negative for arthralgias, joint swelling and myalgias.   Skin: Negative for color change, pallor, rash and wound.   Allergic/Immunologic: Negative for environmental allergies, food allergies and immunocompromised state.   Neurological: Positive for headaches. Negative for dizziness, tremors, seizures, syncope, facial asymmetry, speech difficulty, weakness, light-headedness and numbness.   Hematological: Negative for adenopathy. Does not bruise/bleed easily.   Psychiatric/Behavioral: Negative for agitation, behavioral problems, confusion, decreased concentration, dysphoric mood, hallucinations, self-injury, sleep disturbance and suicidal ideas. The patient is nervous/anxious. The patient is not hyperactive.         Objective      Physical Exam  Pulse 109   Ht 165.1 cm (65\")   Wt 83.9 kg (185 lb)   SpO2 (!) 75%   BMI 30.79 kg/m²     Body mass index is 30.79 kg/m².    General:   Mental Status:  Alert   Appearance: Cooperative, in no acute distress   Build and Nutrition: Well-nourished and well developed female   Orientation: Alert and oriented to person, place and time   Posture: Normal   Gait: Normal    Integument:   Left hip: Wound is well-healed with no signs of infection    Lower Extremity:   Left Hip:    Tenderness:  None    Swelling:  None    Crepitus:  None    Range of motion:  External Rotation: 30°       Internal Rotation: 30°       Flexion:  100°       Extension:  0°    Deformities:  None  Functional testing: Negative [facility]    No leg length discrepancy      Imaging/Studies      Imaging Results (last 24 hours)     Procedure Component Value Units Date/Time    XR Hip With or Without Pelvis 1 View Left " [[id number]] Resulted:  12/10/18 1542     Updated:  12/10/18 1543    Narrative:       Left Hip Radiographs  Indication: status-post left total hip arthroplasty  Views: low AP pelvis and lateral of the left hip    Comparison: no change compared to prior study, 10/4/2016     Findings:   The components are well aligned, with no signs of loosening or failure.          Assessment and Plan     Yumiko was seen today for pain.    Diagnoses and all orders for this visit:    H/O total hip arthroplasty, left  -     XR Hip With or Without Pelvis 1 View Left    Postoperative examination        I reviewed my findings with patient today.  Her left total hip arthroplasty appears to be functioning well, and we will see her back in 3 years, which will be a 5 year checkup.  I will see her back sooner for any problems with her hip.  Original surgery was with Dr. Martin, in October 2016.    Return in about 3 years (around 12/10/2021) for Recheck with X-Rays.      Medical Decision Making  Data/Risk: radiology tests and independent visualization of imaging, lab tests, or EMG/NCV      Juan Manuel Jonas MD  12/10/18  5:23 PM

## 2020-02-06 PROBLEM — K21.9 GERD (GASTROESOPHAGEAL REFLUX DISEASE): Status: ACTIVE | Noted: 2020-02-06

## 2020-02-06 PROBLEM — M19.90 ARTHRITIS: Status: ACTIVE | Noted: 2020-02-06

## 2020-02-06 PROBLEM — R94.31 ABNORMAL EKG: Status: ACTIVE | Noted: 2020-02-06

## 2020-02-06 NOTE — PROGRESS NOTES
Subjective   Yumiko Oswald is a 54 y.o. female.  Primary Care: Jade Cuellar MD  Referring: JonnyGénesis, APRN  [address] [id number]      Chief Complaint   Patient presents with   • Abnormal ECG   • Chest Pain   • Shortness of Breath   • Irregular Heart Beat   • Leg Pain     Patient Active Problem List    Diagnosis    • Chest pain    • Abnormal EKG      1. Echo 12-11-17:  Left ventricular systolic function is normal. Estimated EF = 55%.   • HTN (hypertension)    • Tachycardia    • Depression    • Kimberly-Danlos disease    • Migraine    • Restless legs    • Fibromyalgia    • Arthritis    • GERD (gastroesophageal reflux disease)    • Status post total replacement of left hip    • Anxiety       History of Present Illness   Is a 54-year-old hypertensive female with no significant prior cardiac history.  She presents with a complaint of abnormal EKG which has consistently been read abnormal since 2014.  Due to Ehler Danlos syndrome she has chronic recurrent costochondritis and history of multiple rib subluxations.  She is also recently noticed periodic episodes of random dyspnea often occurring while talking.  She walks 10,000 steps daily at work which does not elicit chest pain or significant dyspnea.  She has been on lisinopril for blood pressure for approximately 4 years.  She states she typically has a high resting heart rate.  She no history of diabetes or tobacco abuse.  She denies exertional chest pain, no orthopnea no PND no claudication no lower extremity edema.  She has no awareness of palpitations and denies dizziness or syncope.  Thyroid function is reported to be within normal range.  She states she has a history of high cholesterol which normalized.    Summary of record reviewed:  Primary care note dated 12/20/2019 reviewed at which time she presented with a complaint of chest pain and shortness of breath.  Her blood pressure that day was 110/84 with a pulse of 110  bpm.  Physical exam was unremarkable.  An EKG that day was felt to be abnormal.      Past Surgical History:   Procedure Laterality Date   • BACK SURGERY     • CHOLECYSTECTOMY N/A 6/7/2018    Procedure: CHOLECYSTECTOMY LAPAROSCOPIC;  Surgeon: Romana Torre MD;  Location: [facility] OR;  Service: General   • COLONOSCOPY     • DILATATION AND CURETTAGE     • ENDOSCOPY     • GASTRIC BYPASS  2002   • HYSTERECTOMY     • ROTATOR CUFF REPAIR      bilateral   • TONSILLECTOMY     • TOTAL HIP ARTHROPLASTY Left 10/4/2016    Procedure: TOTAL HIP ARTHROPLASTY LEFT;  Surgeon: Arvind Oh MD;  Location: [facility] OR;  Service:    • TOTAL HIP ARTHROPLASTY Left        The following portions of the patient's history were reviewed and updated as appropriate: allergies, current medications, past family history, past medical history, past social history, past surgical history and problem list.    Allergies   Allergen Reactions   • Ultram [Tramadol] Itching     Severe itching         Current Outpatient Medications:   •  ALPRAZolam (XANAX) 0.5 MG tablet, Take 0.5 mg by mouth daily as needed for anxiety., Disp: , Rfl:   •  Biotin (BIOTIN 5000) 5 MG capsule, Take 10,000 mg by mouth Daily., Disp: , Rfl:   •  Calcium Carb-Cholecalciferol (CALCIUM 600 + D PO), Take 1 tablet by mouth Daily., Disp: , Rfl:   •  Cyanocobalamin (VITAMIN B-12 IJ), Inject 1,000 mcg as directed every 30 (thirty) days., Disp: , Rfl:   •  dexlansoprazole (DEXILANT) 60 MG capsule, Take 60 mg by mouth Daily., Disp: , Rfl:   •  diclofenac (VOLTAREN) 1 % gel gel, Apply 4 g topically 2 (Two) Times a Day As Needed., Disp: , Rfl:   •  diphenhydrAMINE (BENADRYL) 25 mg capsule, Take 25 mg by mouth At Night As Needed for Itching., Disp: , Rfl:   •  DULoxetine HCl (DRIZALMA) 60 MG capsule, Take 60 mg by mouth Daily. 2 tabs daily, Disp: , Rfl:   •  HYDROcodone-acetaminophen (NORCO)  MG per tablet, Take 1 tablet by mouth 3 (Three) Times a Day As Needed for pain, Disp: 90  tablet, Rfl: 0  •  levocetirizine (XYZAL) 5 MG tablet, Take 5 mg by mouth As Needed for Allergies., Disp: , Rfl:   •  Magnesium 500 MG capsule, Take 1 tablet by mouth Daily., Disp: , Rfl:   •  Multiple Vitamins-Minerals (MULTIVITAMIN WITH MINERALS) tablet tablet, Take 1 tablet by mouth Daily., Disp: , Rfl:   •  NON FORMULARY, Take 3 capsules by mouth As Needed. Rebecca's Restful Legs , Disp: , Rfl:   •  ondansetron ODT (ZOFRAN-ODT) 8 MG disintegrating tablet, Take 1 tablet by mouth Every 8 (Eight) Hours As Needed for nausea or vomiting., Disp: 16 tablet, Rfl: 0  •  POTASSIUM PO, Take 550 mg by mouth Daily., Disp: , Rfl:   •  rizatriptan (MAXALT) 10 MG tablet, Take 10 mg by mouth 1 (One) Time As Needed for Migraine. May repeat in 2 hours if needed, Disp: , Rfl:   •  topiramate (TOPAMAX) 50 MG tablet, Take 50 mg by mouth 2 (Two) Times a Day., Disp: , Rfl:   •  Turmeric Curcumin 500 MG capsule, Take 1 tablet by mouth Daily., Disp: , Rfl:   •  Valerian 450 MG capsule, Take 1 tablet by mouth Daily., Disp: , Rfl:   · Lisinopril 10 mg daily        Review of Systems   Constitution: Positive for malaise/fatigue.   HENT: Positive for tinnitus.    Eyes: Negative.    Cardiovascular: Positive for chest pain and palpitations.   Respiratory: Positive for shortness of breath.    Endocrine: Positive for cold intolerance and heat intolerance.   Hematologic/Lymphatic: Negative.    Skin: Negative.    Musculoskeletal: Positive for arthritis, muscle weakness and myalgias.   Gastrointestinal: Positive for heartburn and nausea.   Genitourinary: Positive for dysuria and urgency.   Neurological: Positive for headaches.   Psychiatric/Behavioral: Positive for depression. The patient is nervous/anxious.    Allergic/Immunologic: Negative.    All other systems reviewed and are negative.      Social History     Socioeconomic History   • Marital status:      Spouse name: Not on file   • Number of children: Not on file   • Years of education:  "Not on file   • Highest education level: Not on file   Tobacco Use   • Smoking status: Never Smoker   • Smokeless tobacco: Never Used   Substance and Sexual Activity   • Alcohol use: No   • Drug use: No   • Sexual activity: Defer     Birth control/protection: Post-menopausal       Family History   Problem Relation Age of Onset   • Breast cancer Maternal Grandmother 50   • Dementia Mother    • Lung cancer Mother    • Heart failure Father    • Ovarian cancer Neg Hx        Objective      /68 (BP Location: Left arm, Patient Position: Sitting)   Pulse 116   Ht 165.1 cm (65\")   Wt 82.1 kg (181 lb)   SpO2 98%   BMI 30.12 kg/m²     Physical Exam   Constitutional: She is oriented to person, place, and time. She appears well-developed and well-nourished. No distress.   HENT:   Head: Normocephalic and atraumatic.   Mouth/Throat: Oropharynx is clear and moist.   Eyes: Pupils are equal, round, and reactive to light. No scleral icterus.   Neck: Neck supple. No JVD present. No tracheal deviation present. No thyromegaly present.   Cardiovascular: Normal rate, regular rhythm and normal heart sounds. Exam reveals no gallop and no friction rub.   No murmur heard.  Pulmonary/Chest: Effort normal and breath sounds normal. No respiratory distress. She has no wheezes. She has no rales.   Abdominal: Soft. Bowel sounds are normal. She exhibits no distension and no mass. There is no tenderness. There is no rebound and no guarding.   Musculoskeletal: Normal range of motion. She exhibits no edema or deformity.   Lymphadenopathy:     She has no cervical adenopathy.   Neurological: She is alert and oriented to person, place, and time. No cranial nerve deficit.   Skin: Skin is warm and dry. No rash noted. She is not diaphoretic.   Psychiatric: She has a normal mood and affect.   Nursing note and vitals reviewed.        ECG 12 Lead  Date/Time: 2/6/2020 5:30 PM  Performed by: Carla Casanova MD  Authorized by: Carla Casanova MD   Previous " ECG: no previous ECG available  Rhythm: sinus rhythm  Rate: normal  Conduction: conduction normal  ST Segments: ST segments normal  QRS axis: normal  Other: no other findings    Clinical impression: normal ECG            Lab Review: From primary care dated 9/23/2019:  CBC: WBCs 8.4, hemoglobin 14, hematocrit 41.3, platelets 374  Assessment:     Diagnosis Plan   1. Abnormal EKG  Stress Test With Myocardial Perfusion (1 Day)    Comprehensive metabolic panel    Lipid panel   2. Essential hypertension   well managed current medical regimen   3. Chest pain, unspecified type appears musculoskeletal with the history of are loose Danlos syndrome and costochondritis, considering her age and risk factors we are concerned about angina. stress test with myocardial perfusion study    XR Chest 2 View      Plan:  Cardiolite stress test, chest x-ray and lipid panel for further evaluation of chest pain and abnormal ECG.  Continue current medications.   Follow-up in 1 month, sooner as needed.  Thank you for allowing us to participate in the care of your patient.     Scribed for Carla Casanova MD by Electronically signed by Electronically signed by MALIA Pinon, 02/07/20, 3:29 PM.    I, Carla Casanova MD, personally performed the services described in this documentation as scribed by the above named individual in my presence, and it is both accurate and complete.  2/7/2020  3:35 PM

## 2020-02-07 ENCOUNTER — CONSULT (OUTPATIENT)
Dept: CARDIOLOGY | Facility: CLINIC | Age: 55
End: 2020-02-07

## 2020-02-07 VITALS
WEIGHT: 181 LBS | HEART RATE: 116 BPM | HEIGHT: 65 IN | BODY MASS INDEX: 30.16 KG/M2 | OXYGEN SATURATION: 98 % | DIASTOLIC BLOOD PRESSURE: 68 MMHG | SYSTOLIC BLOOD PRESSURE: 110 MMHG

## 2020-02-07 DIAGNOSIS — I10 ESSENTIAL HYPERTENSION: ICD-10-CM

## 2020-02-07 DIAGNOSIS — R07.9 CHEST PAIN, UNSPECIFIED TYPE: ICD-10-CM

## 2020-02-07 DIAGNOSIS — R94.31 ABNORMAL EKG: Primary | ICD-10-CM

## 2020-02-07 PROBLEM — M79.7 FIBROMYALGIA: Status: ACTIVE | Noted: 2020-02-07

## 2020-02-07 PROBLEM — R00.0 TACHYCARDIA: Status: ACTIVE | Noted: 2020-02-07

## 2020-02-07 PROBLEM — F32.A DEPRESSION: Status: ACTIVE | Noted: 2020-02-07

## 2020-02-07 PROBLEM — G25.81 RESTLESS LEGS: Status: ACTIVE | Noted: 2020-02-07

## 2020-02-07 PROBLEM — Q79.60 EHLERS-DANLOS DISEASE: Status: ACTIVE | Noted: 2020-02-07

## 2020-02-07 PROBLEM — G43.909 MIGRAINE: Status: ACTIVE | Noted: 2020-02-07

## 2020-02-07 PROCEDURE — 99244 OFF/OP CNSLTJ NEW/EST MOD 40: CPT | Performed by: INTERNAL MEDICINE

## 2020-02-07 PROCEDURE — 93000 ELECTROCARDIOGRAM COMPLETE: CPT | Performed by: INTERNAL MEDICINE

## 2020-02-07 RX ORDER — FOLIC ACID 0.8 MG
1 TABLET ORAL DAILY
COMMUNITY

## 2020-02-07 RX ORDER — LEVOCETIRIZINE DIHYDROCHLORIDE 5 MG/1
5 TABLET, FILM COATED ORAL AS NEEDED
COMMUNITY
End: 2021-08-31

## 2020-02-07 RX ORDER — DEXLANSOPRAZOLE 60 MG/1
60 CAPSULE, DELAYED RELEASE ORAL DAILY
COMMUNITY
End: 2021-08-31

## 2020-02-07 RX ORDER — LISINOPRIL 10 MG/1
10 TABLET ORAL DAILY
COMMUNITY
End: 2021-08-31

## 2020-03-06 ENCOUNTER — HOSPITAL ENCOUNTER (OUTPATIENT)
Dept: CARDIOLOGY | Facility: HOSPITAL | Age: 55
Discharge: HOME | End: 2020-03-06
Admitting: PHYSICIAN ASSISTANT

## 2020-03-06 ENCOUNTER — HOSPITAL ENCOUNTER (OUTPATIENT)
Dept: CARDIOLOGY | Facility: HOSPITAL | Age: 55
Discharge: HOME | End: 2020-03-06

## 2020-03-06 ENCOUNTER — LAB (OUTPATIENT)
Dept: LAB | Facility: HOSPITAL | Age: 55
End: 2020-03-06

## 2020-03-06 VITALS
SYSTOLIC BLOOD PRESSURE: 120 MMHG | HEART RATE: 85 BPM | BODY MASS INDEX: 30.16 KG/M2 | HEIGHT: 65 IN | WEIGHT: 181 LBS | DIASTOLIC BLOOD PRESSURE: 84 MMHG

## 2020-03-06 DIAGNOSIS — R94.31 ABNORMAL EKG: ICD-10-CM

## 2020-03-06 LAB
ALBUMIN SERPL-MCNC: 4.8 G/DL (ref 3.5–5.2)
ALBUMIN/GLOB SERPL: 2.2 G/DL
ALP SERPL-CCNC: 47 U/L (ref 39–117)
ALT SERPL W P-5'-P-CCNC: 15 U/L (ref 1–33)
ANION GAP SERPL CALCULATED.3IONS-SCNC: 12.3 MMOL/L (ref 5–15)
AST SERPL-CCNC: 18 U/L (ref 1–32)
BH CV STRESS BP STAGE 1: NORMAL
BH CV STRESS BP STAGE 2: NORMAL
BH CV STRESS BP STAGE 4: NORMAL
BH CV STRESS COMMENTS STAGE 1: NORMAL
BH CV STRESS DOSE REGADENOSON STAGE 1: 0.4
BH CV STRESS DURATION MIN STAGE 1: 1
BH CV STRESS DURATION MIN STAGE 2: 1
BH CV STRESS DURATION MIN STAGE 3: 1
BH CV STRESS DURATION MIN STAGE 4: 1
BH CV STRESS DURATION SEC STAGE 2: 0
BH CV STRESS HR STAGE 1: 104
BH CV STRESS HR STAGE 2: 112
BH CV STRESS HR STAGE 3: 103
BH CV STRESS HR STAGE 4: 97
BH CV STRESS METS STAGE 2: 7.5
BH CV STRESS PROTOCOL 1: NORMAL
BH CV STRESS RECOVERY BP: NORMAL MMHG
BH CV STRESS RECOVERY HR: 95 BPM
BH CV STRESS STAGE 1: 1
BH CV STRESS STAGE 2: 2
BH CV STRESS STAGE 3: 3
BH CV STRESS STAGE 4: 4
BILIRUB SERPL-MCNC: 0.4 MG/DL (ref 0.2–1.2)
BUN BLD-MCNC: 10 MG/DL (ref 6–20)
BUN/CREAT SERPL: 11.5 (ref 7–25)
CALCIUM SPEC-SCNC: 9.6 MG/DL (ref 8.6–10.5)
CHLORIDE SERPL-SCNC: 97 MMOL/L (ref 98–107)
CHOLEST SERPL-MCNC: 261 MG/DL (ref 0–200)
CO2 SERPL-SCNC: 27.7 MMOL/L (ref 22–29)
CREAT BLD-MCNC: 0.87 MG/DL (ref 0.57–1)
GFR SERPL CREATININE-BSD FRML MDRD: 68 ML/MIN/1.73
GLOBULIN UR ELPH-MCNC: 2.2 GM/DL
GLUCOSE BLD-MCNC: 100 MG/DL (ref 65–99)
HDLC SERPL-MCNC: 83 MG/DL (ref 40–60)
LDLC SERPL CALC-MCNC: 152 MG/DL (ref 0–100)
LDLC/HDLC SERPL: 1.84 {RATIO}
LV EF NUC BP: 63 %
MAXIMAL PREDICTED HEART RATE: 165 BPM
PERCENT MAX PREDICTED HR: 69.09 %
POTASSIUM BLD-SCNC: 3.8 MMOL/L (ref 3.5–5.2)
PROT SERPL-MCNC: 7 G/DL (ref 6–8.5)
SODIUM BLD-SCNC: 137 MMOL/L (ref 136–145)
STRESS BASELINE BP: NORMAL MMHG
STRESS BASELINE HR: 82 BPM
STRESS PERCENT HR: 81 %
STRESS POST PEAK BP: NORMAL MMHG
STRESS POST PEAK HR: 114 BPM
STRESS TARGET HR: 140 BPM
TRIGL SERPL-MCNC: 128 MG/DL (ref 0–150)
VLDLC SERPL-MCNC: 25.6 MG/DL (ref 5–40)

## 2020-03-06 PROCEDURE — 80053 COMPREHEN METABOLIC PANEL: CPT

## 2020-03-06 PROCEDURE — A9500 TC99M SESTAMIBI: HCPCS | Performed by: PHYSICIAN ASSISTANT

## 2020-03-06 PROCEDURE — 0 TECHNETIUM SESTAMIBI: Performed by: PHYSICIAN ASSISTANT

## 2020-03-06 PROCEDURE — 78452 HT MUSCLE IMAGE SPECT MULT: CPT | Performed by: INTERNAL MEDICINE

## 2020-03-06 PROCEDURE — 93018 CV STRESS TEST I&R ONLY: CPT | Performed by: INTERNAL MEDICINE

## 2020-03-06 PROCEDURE — 93017 CV STRESS TEST TRACING ONLY: CPT

## 2020-03-06 PROCEDURE — 36415 COLL VENOUS BLD VENIPUNCTURE: CPT

## 2020-03-06 PROCEDURE — 78452 HT MUSCLE IMAGE SPECT MULT: CPT

## 2020-03-06 PROCEDURE — 80061 LIPID PANEL: CPT

## 2020-03-06 PROCEDURE — 25010000002 REGADENOSON 0.4 MG/5ML SOLUTION: Performed by: PHYSICIAN ASSISTANT

## 2020-03-06 RX ADMIN — REGADENOSON 0.4 MG: 0.08 INJECTION, SOLUTION INTRAVENOUS at 10:53

## 2020-03-06 RX ADMIN — TECHNETIUM TC 99M SESTAMIBI 1 DOSE: 1 INJECTION INTRAVENOUS at 09:05

## 2020-03-06 RX ADMIN — TECHNETIUM TC 99M SESTAMIBI 1 DOSE: 1 INJECTION INTRAVENOUS at 10:55

## 2020-03-09 ENCOUNTER — TELEPHONE (OUTPATIENT)
Dept: CARDIOLOGY | Facility: CLINIC | Age: 55
End: 2020-03-09

## 2020-03-09 NOTE — TELEPHONE ENCOUNTER
Informed pt of these results and AA recommendations. Pt verbalized understanding and is agreeable to plan.

## 2020-03-09 NOTE — TELEPHONE ENCOUNTER
----- Message from Carla Casanova MD sent at 3/6/2020  4:57 PM EST -----  Wheeze notify the patient that her stress test was normal.  Continue same treatment and keep scheduled appointment for follow-up.

## 2020-03-13 ENCOUNTER — OFFICE VISIT (OUTPATIENT)
Dept: CARDIOLOGY | Facility: CLINIC | Age: 55
End: 2020-03-13

## 2020-03-13 VITALS
BODY MASS INDEX: 30.49 KG/M2 | OXYGEN SATURATION: 98 % | WEIGHT: 183 LBS | HEART RATE: 109 BPM | SYSTOLIC BLOOD PRESSURE: 90 MMHG | HEIGHT: 65 IN | DIASTOLIC BLOOD PRESSURE: 60 MMHG

## 2020-03-13 DIAGNOSIS — E78.5 DYSLIPIDEMIA: ICD-10-CM

## 2020-03-13 DIAGNOSIS — I10 ESSENTIAL HYPERTENSION: Primary | ICD-10-CM

## 2020-03-13 PROCEDURE — 99213 OFFICE O/P EST LOW 20 MIN: CPT | Performed by: INTERNAL MEDICINE

## 2020-03-13 NOTE — PROGRESS NOTES
[facility] Cardiology    Encounter Date:2020    Patient ID: Yumiko Oswald is a 54 y.o. female.  : 1965     PCP: Jade Cuellar MD       Chief Complaint: Hypertension      PROBLEM LIST:  1. Chest pain  a. Echo, 2017: EF 55%.  b. MPS, 2020: EF 63%. No evidence of ischemia.  2. Hypertension.  3. Tachycardia.   4. Depression.  5. Kimberly-Danlos syndrome.  6. RLS.  7. Fibromyalgia.  8. GERD.  9. Surgical history:  a. S/p total left hip replacement.    History of Present Illness  Patient presents today for one month follow-up with a history of chest pain and cardiac risk factors. Since last visit, pt has continued to experience chest pain. She believes this is musculoskeletal, related to her costochondritis. Patient denies shortness of breath, palpitations, edema, dizziness, and syncope.      Allergies   Allergen Reactions   • Ultram [Tramadol] Itching     Severe itching         Current Outpatient Medications:   •  ALPRAZolam (XANAX) 0.5 MG tablet, Take 0.5 mg by mouth daily as needed for anxiety., Disp: , Rfl:   •  Biotin (BIOTIN 5000) 5 MG capsule, Take 10,000 mg by mouth Daily. Takes 2 tablets daily, Disp: , Rfl:   •  Calcium Carb-Cholecalciferol (CALCIUM 600 + D PO), Take 1 tablet by mouth Daily., Disp: , Rfl:   •  Cyanocobalamin (VITAMIN B-12 IJ), Inject 1,000 mcg as directed every 30 (thirty) days., Disp: , Rfl:   •  dexlansoprazole (DEXILANT) 60 MG capsule, Take 60 mg by mouth Daily., Disp: , Rfl:   •  diclofenac (VOLTAREN) 1 % gel gel, Apply 4 g topically 2 (Two) Times a Day As Needed., Disp: , Rfl:   •  diphenhydrAMINE (BENADRYL) 25 mg capsule, Take 25 mg by mouth At Night As Needed for Itching., Disp: , Rfl:   •  DULoxetine HCl (DRIZALMA) 60 MG capsule, Take 60 mg by mouth Daily. 2 tabs daily, Disp: , Rfl:   •  HYDROcodone-acetaminophen (NORCO)  MG per tablet, Take 1 tablet by mouth 3 (Three) Times a Day As Needed for pain, Disp: 90 tablet,  Rfl: 0  •  levocetirizine (XYZAL) 5 MG tablet, Take 5 mg by mouth As Needed for Allergies., Disp: , Rfl:   •  lisinopril (PRINIVIL,ZESTRIL) 10 MG tablet, Take 10 mg by mouth Daily., Disp: , Rfl:   •  Magnesium 500 MG capsule, Take 1 tablet by mouth Daily., Disp: , Rfl:   •  Multiple Vitamins-Minerals (MULTIVITAMIN WITH MINERALS) tablet tablet, Take 1 tablet by mouth Daily., Disp: , Rfl:   •  NON FORMULARY, Take 3 capsules by mouth As Needed. Rebecca's Restful Legs , Disp: , Rfl:   •  ondansetron ODT (ZOFRAN-ODT) 8 MG disintegrating tablet, Take 1 tablet by mouth Every 8 (Eight) Hours As Needed for nausea or vomiting., Disp: 16 tablet, Rfl: 0  •  POTASSIUM PO, Take 550 mg by mouth Daily., Disp: , Rfl:   •  rizatriptan (MAXALT) 10 MG tablet, Take 10 mg by mouth 1 (One) Time As Needed for Migraine. May repeat in 2 hours if needed, Disp: , Rfl:   •  topiramate (TOPAMAX) 50 MG tablet, Take 50 mg by mouth 2 (Two) Times a Day., Disp: , Rfl:   •  Turmeric Curcumin 500 MG capsule, Take 1 tablet by mouth Daily., Disp: , Rfl:   •  Valerian 450 MG capsule, Take 1 tablet by mouth Daily., Disp: , Rfl:     The following portions of the patient's history were reviewed and updated as appropriate: allergies, current medications, past family history, past medical history, past social history, past surgical history and problem list.    ROS  Review of Systems   Constitution: Negative for chills, fever, fatigue, generalized weakness.   Cardiovascular: Negative for claudication, dyspnea on exertion, leg swelling, palpitations, orthopnea, and syncope. Positive for chest pain.  Respiratory: Negative for cough, shortness of breath, and wheezing.  HENT: Negative for ear pain, nosebleeds, and tinnitus.  Gastrointestinal: Negative for abdominal pain, constipation, diarrhea, nausea and vomiting.   Genitourinary: No urinary symptoms.  Musculoskeletal: Negative for muscle cramps.  Neurological: Negative for dizziness, headaches, loss of balance,  "numbness, and symptoms of stroke.  Psychiatric: Normal mental status.     All other systems reviewed and are negative.    Objective:     BP 90/60 (BP Location: Left arm, Patient Position: Sitting)   Pulse 109   Ht 165.1 cm (65\")   Wt 83 kg (183 lb)   SpO2 98%   BMI 30.45 kg/m²        Physical Exam  Constitutional: Patient appears well-developed and well-nourished.   HENT: HEENT exam unremarkable.   Neck: Neck supple. No JVD present. No carotid bruits.   Cardiovascular: Normal rate, regular rhythm and normal heart sounds. No murmur heard.   2+ symmetric pulses.   Pulmonary/Chest: Breath sounds normal.  Positive chest wall tenderness.  Abdominal: Abdomen benign.   Musculoskeletal: Does not exhibit edema.   Neurological: Neurological exam unremarkable.   Vitals reviewed.    Lab Review:   Lab Results   Component Value Date    GLUCOSE 100 (H) 03/06/2020    BUN 10 03/06/2020    CREATININE 0.87 03/06/2020    EGFRIFNONA 68 03/06/2020    BCR 11.5 03/06/2020    K 3.8 03/06/2020    CO2 27.7 03/06/2020    CALCIUM 9.6 03/06/2020    ALBUMIN 4.80 03/06/2020    AST 18 03/06/2020    ALT 15 03/06/2020     Lab Results   Component Value Date    CHOL 261 (H) 03/06/2020    TRIG 128 03/06/2020    HDL 83 (H) 03/06/2020     (H) 03/06/2020        Procedures       Assessment:      Diagnosis Plan   1. Essential hypertension  Well-controlled, continue lisinopril.   2. Dyslipidemia  Reviewed recent lipid panel with patient. We will not start any statin therapy as her LDL:HDL ratio is less than 2:1. Dietary changes and exercise recommended to lower LDL.   3. Chest pain, most probably musculoskeletal Non-cardiac chest pain, likely costochondritis.  Findings of negative stress test discussed and she was reassured.  Continue rest and apply heating pad.  Take OTC Tylenol as she is unable to tolerate NSAIDs with history of PUD.  In case of ongoing symptoms she should consult pain management for local injections.     Plan:   Stable cardiac " factors.  Continue current medications.   FU as needed.  Thank you for allowing us to participate in the care of your patient.     Scribed for Carla Casanova MD by Angela Askew. 3/13/2020  14:16      I, Carla Casanova MD, personally performed the services described in this documentation as scribed by the above named individual in my presence, and it is both accurate and complete.  3/13/2020  16:23        Please note that portions of this note may have been completed with a voice recognition program. Efforts were made to edit the dictations, but occasionally words are mistranscribed.

## 2020-12-10 ENCOUNTER — LAB (OUTPATIENT)
Dept: LAB | Facility: HOSPITAL | Age: 55
End: 2020-12-10

## 2020-12-10 DIAGNOSIS — Z13.220 SCREENING FOR LIPOID DISORDERS: Primary | ICD-10-CM

## 2020-12-10 PROCEDURE — 80061 LIPID PANEL: CPT

## 2020-12-10 PROCEDURE — 36415 COLL VENOUS BLD VENIPUNCTURE: CPT

## 2020-12-11 LAB
CHOLEST SERPL-MCNC: 230 MG/DL (ref 0–200)
HDLC SERPL-MCNC: 80 MG/DL (ref 40–60)
LDLC SERPL CALC-MCNC: 133 MG/DL (ref 0–100)
LDLC/HDLC SERPL: 1.62 {RATIO}
TRIGL SERPL-MCNC: 101 MG/DL (ref 0–150)
VLDLC SERPL-MCNC: 17 MG/DL (ref 5–40)

## 2020-12-14 NOTE — OP NOTE
-- DO NOT REPLY / DO NOT REPLY ALL --  -- Message is from the [facility]--    Patient is requesting a medication refill - medication is on active medication list    Patient is currently OUT of the requested medication.    Was Medication Pended?  No.     Rx Name and Dose:  One touch lancets and needles    Duration: 90 days    Pharmacy  [id number] - [place], [place] - [address] [facility]    Patient confirmed the above pharmacy as correct?  Yes    Caller Information       Type Contact Phone    12/14/2020 12:44 PM CST Phone (Incoming) Coco Shaw (Self) [phone] (H)          Alternative phone number: n    Turnaround time given to caller:   \"This message will be sent to [state Provider's name]. The clinical team will fulfill your request as soon as they review your message.\"   Operative Note    Date of Surgery:  6/7/2018    Pre-Operative Diagnosis: Cholelithiasis    Post-Operative Diagnosis: Cholelithiasis with chronic cholecystitis    Procedure:  Laparoscopic cholecystectomy    Anesthesia:  General    Surgeon:  Romana Torre MD    Assistant:  MALIA Camacho    Estimated Blood Loss:  Very minimal     Findings: Distended gallbladder    Complications: None      Indication for Procedure: Mrs. Oswald is a 52 years old lady who presented with biliary colic and workup remarkable for cholelithiasis with no elevation of liver function tests or marked dilatation of the common bile duct.  She presents today for laparoscopic cholecystectomy.    Procedure: Patient was taken to the operating room by anesthesia and placed supine on the table.  Following induction of general endotracheal anesthesia, SCDs were placed.  She received 2 g of Kefzol IV.  The abdomen was then prepped and draped in a sterile fashion.  Timeout was observed.  Marcaine 0.5% with epinephrine was injected in the umbilical region and a small stab incision was made.  The fascia was then incised to enter the abdominal cavity.  The Mcdaniel introducer was advanced and the abdomen insufflated to 15 mmHg using CO2.  The 10 mm scope was advanced and the abdomen inspected with some adhesions noted in the hepatic flexure to the abdominal wall.  Patient was then placed in reversed Trendelenburg position, right side up.  An 11 mm trocar was placed in the epigastric region and two 5 mm trochars were placed in the right upper quadrant under direct vision.  The gallbladder was distended.  The fundus was grasped and pulled over the liver.  The infundibulum was pulled inferiorly and laterally.  With gentle dissection the cystic duct was identified.  It was not dilated.  The junction of the common bile duct was noted.  The cystic duct along with the cystic artery were then clipped and transected.  The gallbladder was then removed off the liver  bed with cautery, placed in Endo Catch bag and delivered out of the abdomen through the umbilical port intact and sent to pathology.  Following adequate hemostasis of the liver bed with cautery, it was well irrigated with normal saline with clear fluid noted.  The clips were intact with no evidence of bile leak or bleeding noted.  The trochars were then removed under direct vision withencounter.  The abdomen was deflated.  The umbilical fascia was approximated with 0 Vicryl sutures and the skin incisions were approximated with 4-0 Monocryl subcuticular suture.  Steri-Strips and sterile dressing was applied.  The patient tolerated procedure well with no complications.  She was extubated and taken to the recovery room in stable condition.  Sponge count and needle count were correct at the end of the procedure.    Romana Torre MD  06/07/18  8:18 AM

## 2021-03-28 ENCOUNTER — APPOINTMENT (OUTPATIENT)
Dept: GENERAL RADIOLOGY | Facility: HOSPITAL | Age: 56
End: 2021-03-28

## 2021-03-28 ENCOUNTER — HOSPITAL ENCOUNTER (EMERGENCY)
Facility: HOSPITAL | Age: 56
Discharge: HOME | End: 2021-03-28
Attending: EMERGENCY MEDICINE | Admitting: EMERGENCY MEDICINE

## 2021-03-28 VITALS
OXYGEN SATURATION: 99 % | BODY MASS INDEX: 29.32 KG/M2 | TEMPERATURE: 97.7 F | RESPIRATION RATE: 16 BRPM | DIASTOLIC BLOOD PRESSURE: 77 MMHG | HEIGHT: 65 IN | WEIGHT: 176 LBS | HEART RATE: 124 BPM | SYSTOLIC BLOOD PRESSURE: 94 MMHG
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
## 2021-03-28 DIAGNOSIS — S61.451A DOG BITE OF MULTIPLE SITES OF RIGHT HAND AND FINGERS, INITIAL ENCOUNTER: Primary | ICD-10-CM

## 2021-03-28 DIAGNOSIS — S61.452A DOG BITE OF MULTIPLE SITES OF LEFT HAND AND FINGERS, INITIAL ENCOUNTER: ICD-10-CM

## 2021-03-28 DIAGNOSIS — S61.259A DOG BITE OF MULTIPLE SITES OF RIGHT HAND AND FINGERS, INITIAL ENCOUNTER: Primary | ICD-10-CM

## 2021-03-28 DIAGNOSIS — S61.259A DOG BITE OF MULTIPLE SITES OF LEFT HAND AND FINGERS, INITIAL ENCOUNTER: ICD-10-CM

## 2021-03-28 PROCEDURE — 99283 EMERGENCY DEPT VISIT LOW MDM: CPT

## 2021-03-28 PROCEDURE — 73130 X-RAY EXAM OF HAND: CPT

## 2021-03-28 RX ORDER — AMOXICILLIN AND CLAVULANATE POTASSIUM 875; 125 MG/1; MG/1
1 TABLET, FILM COATED ORAL 2 TIMES DAILY
Qty: 10 TABLET | Refills: 0 | Status: SHIPPED | OUTPATIENT
Start: 2021-03-28 | End: 2021-04-02

## 2021-03-28 RX ORDER — LIDOCAINE HYDROCHLORIDE 10 MG/ML
10 INJECTION, SOLUTION EPIDURAL; INFILTRATION; INTRACAUDAL; PERINEURAL ONCE
Status: COMPLETED | OUTPATIENT
Start: 2021-03-28 | End: 2021-03-28

## 2021-03-28 RX ORDER — BUPROPION HYDROCHLORIDE 300 MG/1
300 TABLET ORAL DAILY
COMMUNITY

## 2021-03-28 RX ORDER — BACITRACIN, NEOMYCIN, POLYMYXIN B 400; 3.5; 5 [USP'U]/G; MG/G; [USP'U]/G
1 OINTMENT TOPICAL ONCE
Status: COMPLETED | OUTPATIENT
Start: 2021-03-28 | End: 2021-03-28

## 2021-03-28 RX ORDER — OXYCODONE AND ACETAMINOPHEN 7.5; 325 MG/1; MG/1
1 TABLET ORAL EVERY 6 HOURS PRN
COMMUNITY

## 2021-03-28 RX ORDER — AMOXICILLIN AND CLAVULANATE POTASSIUM 875; 125 MG/1; MG/1
1 TABLET, FILM COATED ORAL ONCE
Status: COMPLETED | OUTPATIENT
Start: 2021-03-28 | End: 2021-03-28

## 2021-03-28 RX ADMIN — LIDOCAINE HYDROCHLORIDE 10 ML: 10 INJECTION, SOLUTION EPIDURAL; INFILTRATION; INTRACAUDAL; PERINEURAL at 12:07

## 2021-03-28 RX ADMIN — BACITRACIN, NEOMYCIN, POLYMYXIN B 1 APPLICATION: 400; 3.5; 5 OINTMENT TOPICAL at 13:22

## 2021-03-28 RX ADMIN — AMOXICILLIN AND CLAVULANATE POTASSIUM 1 TABLET: 875; 125 TABLET, FILM COATED ORAL at 11:34

## 2021-04-19 ENCOUNTER — HOSPITAL ENCOUNTER (OUTPATIENT)
Dept: GENERAL RADIOLOGY | Facility: HOSPITAL | Age: 56
Discharge: HOME | End: 2021-04-19
Admitting: INTERNAL MEDICINE

## 2021-04-19 ENCOUNTER — TRANSCRIBE ORDERS (OUTPATIENT)
Dept: ADMINISTRATIVE | Facility: HOSPITAL | Age: 56
End: 2021-04-19

## 2021-04-19 DIAGNOSIS — R09.89 CHOKING SENSATION: Primary | ICD-10-CM

## 2021-04-19 PROCEDURE — 71046 X-RAY EXAM CHEST 2 VIEWS: CPT

## 2021-05-27 ENCOUNTER — LAB (OUTPATIENT)
Dept: LAB | Facility: HOSPITAL | Age: 56
End: 2021-05-27

## 2021-05-27 ENCOUNTER — TRANSCRIBE ORDERS (OUTPATIENT)
Dept: LAB | Facility: HOSPITAL | Age: 56
End: 2021-05-27

## 2021-05-27 DIAGNOSIS — E55.9 AVITAMINOSIS D: Primary | ICD-10-CM

## 2021-05-27 DIAGNOSIS — M62.81 MUSCLE WEAKNESS (GENERALIZED): ICD-10-CM

## 2021-05-27 DIAGNOSIS — E55.9 AVITAMINOSIS D: ICD-10-CM

## 2021-05-27 LAB
25(OH)D3 SERPL-MCNC: 82.6 NG/ML
ALBUMIN SERPL-MCNC: 5.1 G/DL (ref 3.5–5.2)
ALBUMIN/GLOB SERPL: 2 G/DL
ALP SERPL-CCNC: 62 U/L (ref 39–117)
ALT SERPL W P-5'-P-CCNC: 23 U/L (ref 1–33)
ANION GAP SERPL CALCULATED.3IONS-SCNC: 11.7 MMOL/L (ref 5–15)
AST SERPL-CCNC: 21 U/L (ref 1–32)
BILIRUB SERPL-MCNC: 0.4 MG/DL (ref 0–1.2)
BUN SERPL-MCNC: 12 MG/DL (ref 6–20)
BUN/CREAT SERPL: 12.9 (ref 7–25)
CALCIUM SPEC-SCNC: 9.8 MG/DL (ref 8.6–10.5)
CHLORIDE SERPL-SCNC: 97 MMOL/L (ref 98–107)
CHOLEST SERPL-MCNC: 224 MG/DL (ref 0–200)
CK SERPL-CCNC: 97 U/L (ref 20–180)
CO2 SERPL-SCNC: 25.3 MMOL/L (ref 22–29)
CREAT SERPL-MCNC: 0.93 MG/DL (ref 0.57–1)
CRP SERPL-MCNC: <0.3 MG/DL (ref 0–0.5)
DEPRECATED RDW RBC AUTO: 43.7 FL (ref 37–54)
ERYTHROCYTE [DISTWIDTH] IN BLOOD BY AUTOMATED COUNT: 13.1 % (ref 12.3–15.4)
ERYTHROCYTE [SEDIMENTATION RATE] IN BLOOD: 14 MM/HR (ref 0–30)
GFR SERPL CREATININE-BSD FRML MDRD: 62 ML/MIN/1.73
GLOBULIN UR ELPH-MCNC: 2.5 GM/DL
GLUCOSE SERPL-MCNC: 99 MG/DL (ref 65–99)
HCT VFR BLD AUTO: 44 % (ref 34–46.6)
HDLC SERPL-MCNC: 71 MG/DL (ref 40–60)
HGB BLD-MCNC: 15.3 G/DL (ref 12–15.9)
LDLC SERPL CALC-MCNC: 138 MG/DL (ref 0–100)
LDLC/HDLC SERPL: 1.91 {RATIO}
MCH RBC QN AUTO: 31.7 PG (ref 26.6–33)
MCHC RBC AUTO-ENTMCNC: 34.8 G/DL (ref 31.5–35.7)
MCV RBC AUTO: 91.1 FL (ref 79–97)
PLATELET # BLD AUTO: 423 10*3/MM3 (ref 140–450)
PMV BLD AUTO: 9.4 FL (ref 6–12)
POTASSIUM SERPL-SCNC: 3.8 MMOL/L (ref 3.5–5.2)
PROT SERPL-MCNC: 7.6 G/DL (ref 6–8.5)
RBC # BLD AUTO: 4.83 10*6/MM3 (ref 3.77–5.28)
SODIUM SERPL-SCNC: 134 MMOL/L (ref 136–145)
TRIGL SERPL-MCNC: 86 MG/DL (ref 0–150)
TSH SERPL DL<=0.05 MIU/L-ACNC: 2.24 UIU/ML (ref 0.27–4.2)
VIT B12 BLD-MCNC: 519 PG/ML (ref 211–946)
VLDLC SERPL-MCNC: 15 MG/DL (ref 5–40)
WBC # BLD AUTO: 7.97 10*3/MM3 (ref 3.4–10.8)

## 2021-05-27 PROCEDURE — 86140 C-REACTIVE PROTEIN: CPT

## 2021-05-27 PROCEDURE — 80053 COMPREHEN METABOLIC PANEL: CPT | Performed by: INTERNAL MEDICINE

## 2021-05-27 PROCEDURE — 80061 LIPID PANEL: CPT

## 2021-05-27 PROCEDURE — 82306 VITAMIN D 25 HYDROXY: CPT

## 2021-05-27 PROCEDURE — 85652 RBC SED RATE AUTOMATED: CPT

## 2021-05-27 PROCEDURE — 36415 COLL VENOUS BLD VENIPUNCTURE: CPT

## 2021-05-27 PROCEDURE — 82550 ASSAY OF CK (CPK): CPT

## 2021-05-27 PROCEDURE — 85027 COMPLETE CBC AUTOMATED: CPT

## 2021-05-27 PROCEDURE — 82607 VITAMIN B-12: CPT

## 2021-05-27 PROCEDURE — 84443 ASSAY THYROID STIM HORMONE: CPT

## 2021-07-08 ENCOUNTER — OFFICE VISIT (OUTPATIENT)
Dept: PULMONOLOGY | Facility: CLINIC | Age: 56
End: 2021-07-08

## 2021-07-08 VITALS
BODY MASS INDEX: 25.68 KG/M2 | HEIGHT: 65 IN | DIASTOLIC BLOOD PRESSURE: 80 MMHG | TEMPERATURE: 98.2 F | SYSTOLIC BLOOD PRESSURE: 108 MMHG | HEART RATE: 83 BPM | RESPIRATION RATE: 16 BRPM | WEIGHT: 154.13 LBS | OXYGEN SATURATION: 100 %

## 2021-07-08 DIAGNOSIS — R06.02 SHORTNESS OF BREATH: Primary | ICD-10-CM

## 2021-07-08 DIAGNOSIS — K21.9 CHRONIC GERD: ICD-10-CM

## 2021-07-08 DIAGNOSIS — Q79.60 EHLERS-DANLOS DISEASE: ICD-10-CM

## 2021-07-08 DIAGNOSIS — R00.0 TACHYCARDIA: ICD-10-CM

## 2021-07-08 PROCEDURE — 99204 OFFICE O/P NEW MOD 45 MIN: CPT | Performed by: INTERNAL MEDICINE

## 2021-07-08 RX ORDER — UBIDECARENONE 30 MG
CAPSULE ORAL
COMMUNITY

## 2021-07-08 RX ORDER — FLUTICASONE PROPIONATE 50 MCG
SPRAY, SUSPENSION (ML) NASAL
COMMUNITY
End: 2021-08-31

## 2021-07-08 RX ORDER — METHOCARBAMOL 500 MG/1
TABLET, FILM COATED ORAL
COMMUNITY
End: 2022-01-27

## 2021-07-08 RX ORDER — ESOMEPRAZOLE MAGNESIUM 40 MG/1
40 CAPSULE, DELAYED RELEASE ORAL
COMMUNITY
Start: 2021-07-03

## 2021-07-08 RX ORDER — CYANOCOBALAMIN 1000 UG/ML
1000 INJECTION, SOLUTION INTRAMUSCULAR; SUBCUTANEOUS
COMMUNITY
Start: 2021-07-03

## 2021-07-08 RX ORDER — LISINOPRIL AND HYDROCHLOROTHIAZIDE 12.5; 1 MG/1; MG/1
TABLET ORAL
COMMUNITY
End: 2021-11-23

## 2021-07-08 NOTE — PROGRESS NOTES
"  PULMONARY  NOTE    Chief Complaint     Dyspnea, non-smoker, history of Kimberly-Danlos, mild mitral regurgitation, tachycardia    History of Present Illness     55-year-old female referred for evaluation of recurrent pulmonary symptoms    She dictates that she gets short of breath about 1-2 times per week.  These episodes can occur with exertion but are just as likely to occur at rest.  Her episodes are characterized by a racing heart and tachycardia.  She feels as though she has a pressure in her chest.  She feels short of breath, as well.  These episodes spontaneously resolve.    She has not been on any respiratory medicines and does not carry any respiratory diagnoses.  Once in the past she was given a steroid inhaler when she was having a lot of sinus and pulmonary symptoms however she developed thrush and has not used the inhaler since.  It was quite a while ago.    She typically has had \"allergies\" in May and September    She does have a long history of reflux.  She has the head of her bed elevated and avoids eating or drinking before going to bed at night.    She typically does not have a cough unless she is experiencing sinus symptoms were related to \"allergies\".    Patient Active Problem List   Diagnosis   • Status post total replacement of left hip   • HTN (hypertension)   • Anxiety   • Arthritis   • Abnormal EKG   • Depression   • Kimberly-Danlos disease   • Migraine   • Restless legs   • Fibromyalgia   • Tachycardia   • Chest pain   • Dyslipidemia   • Shortness of breath   • GERD     Allergies   Allergen Reactions   • Ultram [Tramadol] Itching     Severe itching       Current Outpatient Medications:   •  ALPRAZolam (XANAX) 0.5 MG tablet, Take 0.5 mg by mouth daily as needed for anxiety., Disp: , Rfl:   •  Biotin (BIOTIN 5000) 5 MG capsule, Take 10,000 mg by mouth Daily. Takes 2 tablets daily, Disp: , Rfl:   •  buPROPion XL (WELLBUTRIN XL) 300 MG 24 hr tablet, Take 300 mg by mouth Daily., Disp: , Rfl:   •  " Calcium Carb-Cholecalciferol (CALCIUM 600 + D PO), Take 1 tablet by mouth Daily., Disp: , Rfl:   •  Cyanocobalamin (VITAMIN B-12 IJ), Inject 1,000 mcg as directed every 30 (thirty) days., Disp: , Rfl:   •  cyanocobalamin 1000 MCG/ML injection, , Disp: , Rfl:   •  dexlansoprazole (DEXILANT) 60 MG capsule, Take 60 mg by mouth Daily., Disp: , Rfl:   •  diclofenac (VOLTAREN) 1 % gel gel, Apply 4 g topically 2 (Two) Times a Day As Needed., Disp: , Rfl:   •  Diclofenac Sodium (Voltaren) 1 % gel gel, Voltaren 1 % topical gel  APPLY 1 GRAM TO THE AFFECTED AREA(S) BY TOPICAL ROUTE 4 TIMES PER DAY PRN, Disp: , Rfl:   •  diphenhydrAMINE (BENADRYL) 25 mg capsule, Take 25 mg by mouth At Night As Needed for Itching., Disp: , Rfl:   •  DULoxetine HCl (DRIZALMA) 60 MG capsule, Take 60 mg by mouth Daily. 2 tabs daily, Disp: , Rfl:   •  esomeprazole (nexIUM) 40 MG capsule, Take 40 mg by mouth Every Morning Before Breakfast., Disp: , Rfl:   •  fluticasone (FLONASE) 50 MCG/ACT nasal spray, fluticasone propionate 50 mcg/actuation nasal spray,suspension  Spray 1 spray every day by intranasal route., Disp: , Rfl:   •  HYDROcodone-acetaminophen (NORCO)  MG per tablet, Take 1 tablet by mouth 3 (Three) Times a Day As Needed for pain, Disp: 90 tablet, Rfl: 0  •  levocetirizine (XYZAL) 5 MG tablet, Take 5 mg by mouth As Needed for Allergies., Disp: , Rfl:   •  lisinopril (PRINIVIL,ZESTRIL) 10 MG tablet, Take 10 mg by mouth Daily., Disp: , Rfl:   •  lisinopril-hydrochlorothiazide (PRINZIDE,ZESTORETIC) 10-12.5 MG per tablet, lisinopril 10 mg-hydrochlorothiazide 12.5 mg tablet  TAKE ONE TABLET BY MOUTH DAILY, Disp: , Rfl:   •  Magnesium 500 MG capsule, Take 1 tablet by mouth Daily., Disp: , Rfl:   •  methocarbamol (ROBAXIN) 500 MG tablet, methocarbamol 500 mg tablet  TAKE ONE TABLET BY MOUTH THREE TIMES A DAY*NEEDS TELEHEALTH APPOINTMENT FOR MORE REFILLS*, Disp: , Rfl:   •  Multiple Vitamins-Minerals (MULTIVITAMIN WITH MINERALS) tablet  "tablet, Take 1 tablet by mouth Daily., Disp: , Rfl:   •  NON FORMULARY, Take 3 capsules by mouth As Needed. [employer] Rest[employer] Legs , Disp: , Rfl:   •  ondansetron ODT (ZOFRAN-ODT) 8 MG disintegrating tablet, Take 1 tablet by mouth Every 8 (Eight) Hours As Needed for nausea or vomiting., Disp: 16 tablet, Rfl: 0  •  oxyCODONE-acetaminophen (PERCOCET) 7.5-325 MG per tablet, Take 1 tablet by mouth Every 6 (Six) Hours As Needed., Disp: , Rfl:   •  Potassium Gluconate 550 MG tablet, potassium gluconate 550 mg (90 mg) tablet  Take by oral route., Disp: , Rfl:   •  POTASSIUM PO, Take 550 mg by mouth Daily., Disp: , Rfl:   •  rizatriptan (MAXALT) 10 MG tablet, Take 10 mg by mouth 1 (One) Time As Needed for Migraine. May repeat in 2 hours if needed, Disp: , Rfl:   •  topiramate (TOPAMAX) 50 MG tablet, Take 50 mg by mouth 2 (Two) Times a Day., Disp: , Rfl:   •  Turmeric Curcumin 500 MG capsule, Take 1 tablet by mouth Daily., Disp: , Rfl:   •  Valerian 450 MG capsule, Take 1 tablet by mouth Daily., Disp: , Rfl:   MEDICATION LIST AND ALLERGIES REVIEWED.    Family History   Problem Relation Age of Onset   • Breast cancer Maternal Grandmother 50   • Dementia Mother    • Lung cancer Mother    • Heart failure Father    • Ovarian cancer Neg Hx      Social History     Tobacco Use   • Smoking status: Never Smoker   • Smokeless tobacco: Never Used   Substance Use Topics   • Alcohol use: No   • Drug use: No     Social History     Social History Narrative    Non-smoker        Does not drink alcohol     FAMILY AND SOCIAL HISTORY REVIEWED.    Review of Systems  ALSO REFER TO SCANNED ROS SHEET FROM SAME DATE.    /80 (BP Location: Left arm, Patient Position: Sitting, Cuff Size: Adult)   Pulse 83   Temp 98.2 °F (36.8 °C)   Resp 16   Ht 165.1 cm (65\")   Wt 69.9 kg (154 lb 2 oz)   SpO2 100% Comment: room air at rest  BMI 25.65 kg/m²   Physical Exam  Vitals and nursing note reviewed.   Constitutional:       General: She is not " in acute distress.     Appearance: She is well-developed. She is not diaphoretic.   HENT:      Head: Normocephalic and atraumatic.   Neck:      Thyroid: No thyromegaly.   Cardiovascular:      Rate and Rhythm: Normal rate and regular rhythm.      Heart sounds: Normal heart sounds. No murmur heard.     Pulmonary:      Effort: Pulmonary effort is normal.      Breath sounds: Normal breath sounds. No stridor.   Musculoskeletal:         General: Normal range of motion.   Lymphadenopathy:      Cervical: No cervical adenopathy.      Upper Body:      Right upper body: No supraclavicular or epitrochlear adenopathy.      Left upper body: No supraclavicular or epitrochlear adenopathy.   Skin:     General: Skin is warm and dry.   Neurological:      Mental Status: She is alert and oriented to person, place, and time.   Psychiatric:         Behavior: Behavior normal.         Results     CT scan of the chest done at the [facility] on 5/17/2021 revealed no intrathoracic abnormalities    Prior echocardiogram from 2017 revealed mild MR and mild TR but preserved left ventricular systolic function    Nuclear stress test done in 2020 revealed no inducible ischemia    There is no immunization history on file for this patient.  Problem List       ICD-10-CM ICD-9-CM   1. Shortness of breath  R06.02 786.05   2. GERD  K21.9 530.81   3. Kimberly-Danlos disease  Q79.60 756.83   4. Tachycardia  R00.0 785.0       Discussion     We reviewed her test results.  Her recent CT scan of the chest and exam are unremarkable other than a hiatal hernia  Not sure of the etiology of her symptoms.  To a large degree the sensation of tachycardia and palpitations appears to be a significant component of her symptoms and occurs even at rest    We will check pulmonary function tests.  Given her history of Kimberly-Danlos a repeat transthoracic echocardiogram may be indicated since the last was done about 4 years ago.    If there is no obvious cardiac  or pulmonary explanation for her symptoms, we might consider a ventilation/perfusion scan to help rule out chronic PTE and/or a cardiopulmonary exercise test.  Both of these tests were discussed with the patient today.    Further plans based on the results of her PFTs    Level of service justified based on 49 minutes spent in patient care on this date of service including, but not limited to: preparing to see the patient, obtaining and/or reviewing history, performing medically appropriate examination, ordering tests/medicine/procedures, independently interpreting results, documenting clinical information in EHR, and counseling/education of patient/family/caregiver (excluding time spent on other separate services such as performing procedures or test interpretation, if applicable). (Level 4 45-59 minutes; Level 5 60-74 minutes)    Chalo Marrufo MD  Note electronically signed    CC: Jade Cuellar MD

## 2021-07-09 ENCOUNTER — CLINICAL SUPPORT NO REQUIREMENTS (OUTPATIENT)
Dept: PULMONOLOGY | Facility: CLINIC | Age: 56
End: 2021-07-09

## 2021-07-09 DIAGNOSIS — Z01.812 BLOOD TESTS PRIOR TO TREATMENT OR PROCEDURE: Primary | ICD-10-CM

## 2021-07-09 PROCEDURE — 99211 OFF/OP EST MAY X REQ PHY/QHP: CPT | Performed by: INTERNAL MEDICINE

## 2021-07-09 PROCEDURE — U0004 COV-19 TEST NON-CDC HGH THRU: HCPCS | Performed by: INTERNAL MEDICINE

## 2021-07-10 LAB — SARS-COV-2 RNA NOSE QL NAA+PROBE: NOT DETECTED

## 2021-07-12 ENCOUNTER — OFFICE VISIT (OUTPATIENT)
Dept: PULMONOLOGY | Facility: CLINIC | Age: 56
End: 2021-07-12

## 2021-07-12 DIAGNOSIS — R00.0 TACHYCARDIA: ICD-10-CM

## 2021-07-12 DIAGNOSIS — R06.02 SHORTNESS OF BREATH: Primary | ICD-10-CM

## 2021-07-12 PROCEDURE — 94375 RESPIRATORY FLOW VOLUME LOOP: CPT | Performed by: INTERNAL MEDICINE

## 2021-07-12 PROCEDURE — 94729 DIFFUSING CAPACITY: CPT | Performed by: INTERNAL MEDICINE

## 2021-07-12 PROCEDURE — 94726 PLETHYSMOGRAPHY LUNG VOLUMES: CPT | Performed by: INTERNAL MEDICINE

## 2021-07-13 ENCOUNTER — DOCUMENTATION (OUTPATIENT)
Dept: PULMONOLOGY | Facility: CLINIC | Age: 56
End: 2021-07-13

## 2021-07-13 NOTE — PROGRESS NOTES
Patient underwent pulmonary function tests which were normal.  I relayed these results to her on the phone    I would recommend a cardiac evaluation and if negative we could consider a ventilation perfusion scan and cardiopulmonary exercise test

## 2021-08-31 ENCOUNTER — OFFICE VISIT (OUTPATIENT)
Dept: NEUROLOGY | Facility: CLINIC | Age: 56
End: 2021-08-31

## 2021-08-31 VITALS
OXYGEN SATURATION: 100 % | SYSTOLIC BLOOD PRESSURE: 118 MMHG | WEIGHT: 153.6 LBS | HEART RATE: 86 BPM | BODY MASS INDEX: 25.59 KG/M2 | HEIGHT: 65 IN | DIASTOLIC BLOOD PRESSURE: 80 MMHG

## 2021-08-31 DIAGNOSIS — G43.109 MIGRAINE WITH AURA AND WITHOUT STATUS MIGRAINOSUS, NOT INTRACTABLE: Primary | ICD-10-CM

## 2021-08-31 PROCEDURE — 99204 OFFICE O/P NEW MOD 45 MIN: CPT | Performed by: PSYCHIATRY & NEUROLOGY

## 2021-08-31 RX ORDER — TOPIRAMATE 50 MG/1
75 TABLET, FILM COATED ORAL 2 TIMES DAILY
Qty: 90 TABLET | Refills: 5 | Status: SHIPPED | OUTPATIENT
Start: 2021-08-31 | End: 2021-09-22

## 2021-08-31 NOTE — PROGRESS NOTES
Subjective:    CC: Yumiko Oswald is seen today in consultation at the request of RODOLFO Avalos for Migraine       HPI:  55-year-old female with a history of Kimberly-Danlos syndrome, hypertension, fibromyalgia, arthritis status post bilateral shoulder and left hip surgery, anxiety, depression, back pain status post L5/S1 surgery presents with headaches.  As per patient she has had headaches for over 25 years.  She would previously get blurred/tunnel vision 30 minutes before the headache started but now she sees red and black images.  The headaches are usually on one side, throbbing in nature with nausea, photophobia, phonophobia and ringing in her years.  She takes Maxalt and Zofran for them which helps.  Was also started on Topamax 50 mg twice a day about 5 years ago.  She currently has a severe headache about once a week lasting for a day a few dull headaches each week in the back of her head and neck which she attributes to arthritis neck.  Has had several bouts of PT which have helped some.  Is also on a muscle relaxer in addition to oxycodone for arthritis now.  Avoids taking NSAIDs as she has had a GI bleed before.  Reports to having sleep disturbances as well as she gets up every 2 hours.  She had a CT head in 2017 that was unremarkable.  Blood work done recently showed a normal B12, normal vitamin D level and TSH.  ESR and CRP were also normal.  LDL was elevated at 138 and she is trying to make dietary modifications      The following portions of the patient's history were reviewed today and updated as of 08/31/2021  : allergies, current medications, past family history, past medical history, past social history, past surgical history and problem list  These document will be scanned to patient's chart.      Current Outpatient Medications:   •  ALPRAZolam (XANAX) 0.5 MG tablet, Take 0.5 mg by mouth daily as needed for anxiety., Disp: , Rfl:   •  Biotin (BIOTIN 5000) 5 MG capsule, Take 10,000 mg  by mouth Daily. Takes 2 tablets daily, Disp: , Rfl:   •  buPROPion XL (WELLBUTRIN XL) 300 MG 24 hr tablet, Take 300 mg by mouth Daily., Disp: , Rfl:   •  Calcium Carb-Cholecalciferol (CALCIUM 600 + D PO), Take 1 tablet by mouth Daily., Disp: , Rfl:   •  cyanocobalamin 1000 MCG/ML injection, , Disp: , Rfl:   •  Diclofenac Sodium (Voltaren) 1 % gel gel, Voltaren 1 % topical gel  APPLY 1 GRAM TO THE AFFECTED AREA(S) BY TOPICAL ROUTE 4 TIMES PER DAY PRN, Disp: , Rfl:   •  diphenhydrAMINE (BENADRYL) 25 mg capsule, Take 25 mg by mouth At Night As Needed for Itching., Disp: , Rfl:   •  esomeprazole (nexIUM) 40 MG capsule, Take 40 mg by mouth Every Morning Before Breakfast., Disp: , Rfl:   •  lisinopril-hydrochlorothiazide (PRINZIDE,ZESTORETIC) 10-12.5 MG per tablet, lisinopril 10 mg-hydrochlorothiazide 12.5 mg tablet  TAKE ONE TABLET BY MOUTH DAILY, Disp: , Rfl:   •  Magnesium 500 MG capsule, Take 1 tablet by mouth Daily., Disp: , Rfl:   •  methocarbamol (ROBAXIN) 500 MG tablet, methocarbamol 500 mg tablet  TAKE ONE TABLET BY MOUTH THREE TIMES A DAY*NEEDS TELEHEALTH APPOINTMENT FOR MORE REFILLS*, Disp: , Rfl:   •  Multiple Vitamins-Minerals (MULTIVITAMIN WITH MINERALS) tablet tablet, Take 1 tablet by mouth Daily., Disp: , Rfl:   •  ondansetron ODT (ZOFRAN-ODT) 8 MG disintegrating tablet, Take 1 tablet by mouth Every 8 (Eight) Hours As Needed for nausea or vomiting., Disp: 16 tablet, Rfl: 0  •  oxyCODONE-acetaminophen (PERCOCET) 7.5-325 MG per tablet, Take 1 tablet by mouth Every 6 (Six) Hours As Needed., Disp: , Rfl:   •  Potassium Gluconate 550 MG tablet, potassium gluconate 550 mg (90 mg) tablet  Take by oral route., Disp: , Rfl:   •  rizatriptan (MAXALT) 10 MG tablet, Take 10 mg by mouth 1 (One) Time As Needed for Migraine. May repeat in 2 hours if needed, Disp: , Rfl:   •  topiramate (TOPAMAX) 50 MG tablet, Take 1.5 tablets by mouth 2 (Two) Times a Day., Disp: 90 tablet, Rfl: 5  •  Turmeric Curcumin 500 MG capsule,  "Take 1 tablet by mouth Daily., Disp: , Rfl:   •  Valerian 450 MG capsule, Take 1 tablet by mouth Daily., Disp: , Rfl:    Past Medical History:   Diagnosis Date   • Anxiety    • Arthritis    • Depression    • Kimberly-Danlos disease    • GERD (gastroesophageal reflux disease)    • GI bleed    • History of transfusion     2 UNITS   • Migraine    • Restless legs    • Wears eyeglasses       Past Surgical History:   Procedure Laterality Date   • BACK SURGERY     • CHOLECYSTECTOMY N/A 6/7/2018    Procedure: CHOLECYSTECTOMY LAPAROSCOPIC;  Surgeon: Romana Torre MD;  Location:  [facility] OR;  Service: General   • COLONOSCOPY     • DILATATION AND CURETTAGE     • ENDOSCOPY     • GASTRIC BYPASS  2002   • HYSTERECTOMY     • ROTATOR CUFF REPAIR      bilateral   • TONSILLECTOMY     • TOTAL HIP ARTHROPLASTY Left 10/4/2016    Procedure: TOTAL HIP ARTHROPLASTY LEFT;  Surgeon: Arvind Oh MD;  Location:  [facility] OR;  Service:    • TOTAL HIP ARTHROPLASTY Left       Family History   Problem Relation Age of Onset   • Breast cancer Maternal Grandmother 50   • Dementia Mother    • Lung cancer Mother    • Heart failure Father    • Ovarian cancer Neg Hx       Social History     Socioeconomic History   • Marital status:      Spouse name: Not on file   • Number of children: Not on file   • Years of education: Not on file   • Highest education level: Not on file   Tobacco Use   • Smoking status: Never Smoker   • Smokeless tobacco: Never Used   Substance and Sexual Activity   • Alcohol use: No   • Drug use: No   • Sexual activity: Defer     Birth control/protection: Post-menopausal     Review of Systems   Musculoskeletal: Positive for arthralgias and back pain.   Neurological: Positive for headache.   All other systems reviewed and are negative.      Objective:    /80   Pulse 86   Ht 165.1 cm (65\")   Wt 69.7 kg (153 lb 9.6 oz)   SpO2 100%   BMI 25.56 kg/m²     Neurology Exam:    General apperance: NAD.  Tenderness to " palpation of the occipital regions bilaterally    Mental status: Alert, awake and oriented to time place and person.    Recent and Remote memory: Intact.    Attention span and Concentration: Normal.     Language and Speech: Intact- No dysarthria.    Fluency, Naming , Repitition and Comprehension:  Intact    Cranial Nerves:   CN II: Visual fields are full. Intact. Fundi - Normal, No papillederma, Pupils - PUSHPA  CN III, IV and VI: Extraocular movements are intact. Normal saccades.   CN V: Facial sensation is intact.   CN VII: Muscles of facial expression reveal no asymmetry. Intact.   CN VIII: Hearing is intact. Whispered voice intact.   CN IX and X: Palate elevates symmetrically. Intact  CN XI: Shoulder shrug is intact.   CN XII: Tongue is midline without evidence of atrophy or fasciculation.     Ophthalmoscopic exam of optic disc-normal    Motor:  Right UE muscle strength 5/5. Normal tone.     Left UE muscle strength 5/5. Normal tone.      Right LE muscle strength5/5.  4/5 DF. Normal tone. Wears bilateral AFOs for foot drops    Left LE muscle strength 5/5.4/5 DF . Normal tone.      Sensory: Normal light touch, vibration and pinprick sensation bilaterally.    DTRs: 2+ bilaterally in upper and lower extremities.    Babinski: Negative bilaterally.    Co-ordination: Normal finger-to-nose, heel to shin B/L.    Rhomberg: Negative.    Gait: Slightly broad-based    Cardiovascular: Regular rate and rhythm without murmur, gallop or rub.    Assessment and Plan:  1. Migraine with aura and without status migrainosus, not intractable  Patient has a combination of migraine and cervico-occipital headaches   I have asked her to increase her Topamax to 75 mg twice a day.  If there is no improvement in her migraine headaches and increase it further after a few weeks  For abortive treatment of her headaches she can continue taking Maxalt 10 mg and Zofran  At her next visit if she is still having cervico-occipital headaches then I will  give her an occipital nerve block or start her on low doses of amitriptyline and she also reports to having poor quality of sleep at night       Return in about 6 weeks (around 10/12/2021).     I spent over 40 minutes with the patient face to face out of which over 50% (30 minutes) was spent in management, instructions and education.     Thea Montiel MD

## 2021-09-09 ENCOUNTER — PATIENT ROUNDING (BHMG ONLY) (OUTPATIENT)
Dept: NEUROLOGY | Facility: CLINIC | Age: 56
End: 2021-09-09

## 2021-09-09 NOTE — PROGRESS NOTES
September 9, 2021    Hello, may I speak with Yumiko Oswald?    My name is Juliet    I am  with [facility]  [address] [facility] [address]  [facility] [address] [phone].    Before we get started may I verify your date of birth? 1965    I am calling to officially welcome you to our practice and ask about your recent visit. Is this a good time to talk? Yes    Tell me about your visit with us. What things went well? It went well. Everything about the migraines was covered in the visit.       We're always looking for ways to make our patients' experiences even better. Do you have recommendations on ways we may improve?  No    Overall were you satisfied with your first visit to our practice? No       I appreciate you taking the time to speak with me today. Is there anything else I can do for you? No, just waiting for the medication to kick in since she just started it.      Thank you, and have a great day.

## 2021-09-22 ENCOUNTER — TELEPHONE (OUTPATIENT)
Dept: NEUROLOGY | Facility: CLINIC | Age: 56
End: 2021-09-22

## 2021-09-22 RX ORDER — TOPIRAMATE 100 MG/1
100 TABLET, FILM COATED ORAL 2 TIMES DAILY
Qty: 60 TABLET | Refills: 5 | Status: SHIPPED | OUTPATIENT
Start: 2021-09-22 | End: 2022-03-10 | Stop reason: SDUPTHER

## 2021-09-22 NOTE — TELEPHONE ENCOUNTER
Yes she can increase her Topamax to 100 mg twice a day.  I have sent in a new prescription to her pharmacy

## 2021-09-22 NOTE — TELEPHONE ENCOUNTER
Caller: Yumiko Oswald    Relationship: Self    Best call back number: [phone]    What was the call regarding: PT CALLED TO PROVIDE UPDATE ON TOPAMAX MEDICATION SINCE HER LAST VISIT WHEN MEDICATION DOSAGE WAS INCREASED. PT STATES THAT SHE HAS NOT NOTICED MUCH DIFFERENCE IN THE INCREASE FROM LAST VISIT. PT IS UNSURE IF THIS IS ATTRIBUTED TO THE WEATHER BUT STATES SHE IS STILL HAVING 2-3 MIGRAINES A WEEK. PT STATES THAT THE MAXALT HELPS TO PREVENT/CURB THE MIGRAINES BUT PT IS STILL NOTICING BREAKTHROUGH MIGRAINES.     PT WOULD LIKE TO KNOW IF DR. RUIZ WOULD LIKE FOR HER TO INCREASE TO 2 TABLETS 2X DAILY AND SHE CAN THEN  PROVIDE UPDATE AT HER NEXT F/U VISIT ON 10/12/21.    Preferred [facility]: [facility] [phone] [place] [phone] [facility]    Do you require a callback: YES, PLEASE.    PLEASE REVIEW AND ADVISE.

## 2021-09-24 ENCOUNTER — TRANSCRIBE ORDERS (OUTPATIENT)
Dept: ADMINISTRATIVE | Facility: HOSPITAL | Age: 56
End: 2021-09-24

## 2021-09-24 DIAGNOSIS — Z12.31 VISIT FOR SCREENING MAMMOGRAM: Primary | ICD-10-CM

## 2021-09-28 ENCOUNTER — LAB (OUTPATIENT)
Dept: LAB | Facility: HOSPITAL | Age: 56
End: 2021-09-28

## 2021-09-28 ENCOUNTER — TRANSCRIBE ORDERS (OUTPATIENT)
Dept: LAB | Facility: HOSPITAL | Age: 56
End: 2021-09-28

## 2021-09-28 DIAGNOSIS — I95.9 HYPOTENSION, UNSPECIFIED HYPOTENSION TYPE: ICD-10-CM

## 2021-09-28 DIAGNOSIS — I95.9 HYPOTENSION, UNSPECIFIED HYPOTENSION TYPE: Primary | ICD-10-CM

## 2021-09-28 LAB
DEPRECATED RDW RBC AUTO: 45.4 FL (ref 37–54)
ERYTHROCYTE [DISTWIDTH] IN BLOOD BY AUTOMATED COUNT: 12.7 % (ref 12.3–15.4)
HCT VFR BLD AUTO: 44.1 % (ref 34–46.6)
HGB BLD-MCNC: 14 G/DL (ref 12–15.9)
MCH RBC QN AUTO: 30.6 PG (ref 26.6–33)
MCHC RBC AUTO-ENTMCNC: 31.7 G/DL (ref 31.5–35.7)
MCV RBC AUTO: 96.5 FL (ref 79–97)
PLATELET # BLD AUTO: 355 10*3/MM3 (ref 140–450)
PMV BLD AUTO: 9.6 FL (ref 6–12)
RBC # BLD AUTO: 4.57 10*6/MM3 (ref 3.77–5.28)
WBC # BLD AUTO: 5.86 10*3/MM3 (ref 3.4–10.8)

## 2021-09-28 PROCEDURE — 84443 ASSAY THYROID STIM HORMONE: CPT

## 2021-09-28 PROCEDURE — 36415 COLL VENOUS BLD VENIPUNCTURE: CPT

## 2021-09-28 PROCEDURE — 80048 BASIC METABOLIC PNL TOTAL CA: CPT

## 2021-09-28 PROCEDURE — 85027 COMPLETE CBC AUTOMATED: CPT

## 2021-09-29 LAB
ANION GAP SERPL CALCULATED.3IONS-SCNC: 13.9 MMOL/L (ref 5–15)
BUN SERPL-MCNC: 13 MG/DL (ref 6–20)
BUN/CREAT SERPL: 15.3 (ref 7–25)
CALCIUM SPEC-SCNC: 9.7 MG/DL (ref 8.6–10.5)
CHLORIDE SERPL-SCNC: 98 MMOL/L (ref 98–107)
CO2 SERPL-SCNC: 21.1 MMOL/L (ref 22–29)
CREAT SERPL-MCNC: 0.85 MG/DL (ref 0.57–1)
GFR SERPL CREATININE-BSD FRML MDRD: 69 ML/MIN/1.73
GLUCOSE SERPL-MCNC: 96 MG/DL (ref 65–99)
POTASSIUM SERPL-SCNC: 3.8 MMOL/L (ref 3.5–5.2)
SODIUM SERPL-SCNC: 133 MMOL/L (ref 136–145)
TSH SERPL DL<=0.05 MIU/L-ACNC: 2.13 UIU/ML (ref 0.27–4.2)

## 2021-09-29 PROCEDURE — 87086 URINE CULTURE/COLONY COUNT: CPT | Performed by: NURSE PRACTITIONER

## 2021-09-30 ENCOUNTER — HOSPITAL ENCOUNTER (OUTPATIENT)
Dept: MAMMOGRAPHY | Facility: HOSPITAL | Age: 56
Discharge: HOME | End: 2021-09-30
Admitting: INTERNAL MEDICINE

## 2021-09-30 DIAGNOSIS — Z12.31 VISIT FOR SCREENING MAMMOGRAM: ICD-10-CM

## 2021-09-30 PROCEDURE — 77067 SCR MAMMO BI INCL CAD: CPT | Performed by: RADIOLOGY

## 2021-09-30 PROCEDURE — 77063 BREAST TOMOSYNTHESIS BI: CPT | Performed by: RADIOLOGY

## 2021-09-30 PROCEDURE — 77067 SCR MAMMO BI INCL CAD: CPT

## 2021-09-30 PROCEDURE — 77063 BREAST TOMOSYNTHESIS BI: CPT

## 2021-10-01 ENCOUNTER — TELEPHONE (OUTPATIENT)
Dept: URGENT CARE | Facility: CLINIC | Age: 56
End: 2021-10-01

## 2021-10-02 ENCOUNTER — TELEPHONE (OUTPATIENT)
Dept: URGENT CARE | Facility: CLINIC | Age: 56
End: 2021-10-02

## 2021-10-12 ENCOUNTER — OFFICE VISIT (OUTPATIENT)
Dept: NEUROLOGY | Facility: CLINIC | Age: 56
End: 2021-10-12

## 2021-10-12 VITALS
DIASTOLIC BLOOD PRESSURE: 80 MMHG | HEIGHT: 65 IN | WEIGHT: 143.2 LBS | HEART RATE: 99 BPM | SYSTOLIC BLOOD PRESSURE: 118 MMHG | BODY MASS INDEX: 23.86 KG/M2 | OXYGEN SATURATION: 99 %

## 2021-10-12 DIAGNOSIS — M54.81 BILATERAL OCCIPITAL NEURALGIA: ICD-10-CM

## 2021-10-12 DIAGNOSIS — G43.109 MIGRAINE WITH AURA AND WITHOUT STATUS MIGRAINOSUS, NOT INTRACTABLE: Primary | ICD-10-CM

## 2021-10-12 PROCEDURE — 64405 NJX AA&/STRD GR OCPL NRV: CPT | Performed by: PSYCHIATRY & NEUROLOGY

## 2021-10-12 PROCEDURE — 64450 NJX AA&/STRD OTHER PN/BRANCH: CPT | Performed by: PSYCHIATRY & NEUROLOGY

## 2021-10-12 PROCEDURE — 99213 OFFICE O/P EST LOW 20 MIN: CPT | Performed by: PSYCHIATRY & NEUROLOGY

## 2021-10-12 NOTE — PROGRESS NOTES
Subjective:    CC: Yumiko Oswald is seen today for Migraine (6 week follow up )     Current visit-since the last visit patient initially increased her Topamax to 75 mg twice daily but noticed no improvement in her headaches therefore a few weeks ago she increased it to 100 mg twice a day. She states that her migraine headaches have improved with Topamax but she continues to have the headaches in the back of her head and neck that radiate up on both sides around her ears and occasionally to her jaw. Therefore I gave her her first occipital nerve block today.  Procedure note for occipital nerve block-The patient's chart was reviewed.  After obtaining verbal consent the patient was placed in a sitting position with her neck flexed and her chin touching her chest.  Both the left and right occipital areas were prepped with antiseptic solution and injected with bupivacaine 0.5% using a 27-gauge needle.  3 cc of bupivacaine was injected at the site of the greater occipital nerve on each side and  2 cc was injected in the lesser occipital nerve on each side.  Patient tolerated the procedure well    Initial [id number]-old female with a history of Kimberly-Danlos syndrome, hypertension, fibromyalgia, arthritis status post bilateral shoulder and left hip surgery, anxiety, depression, back pain status post L5/S1 surgery presents with headaches.  As per patient she has had headaches for over 25 years.  She would previously get blurred/tunnel vision 30 minutes before the headache started but now she sees red and black images.  The headaches are usually on one side, throbbing in nature with nausea, photophobia, phonophobia and ringing in her years.  She takes Maxalt and Zofran for them which helps.  Was also started on Topamax 50 mg twice a day about 5 years ago.  She currently has a severe headache about once a week lasting for a day a few dull headaches each week in the back of her head and neck which she attributes to  arthritis neck.  Has had several bouts of PT which have helped some.  Is also on a muscle relaxer in addition to oxycodone for arthritis now.  Avoids taking NSAIDs as she has had a GI bleed before.  Reports to having sleep disturbances as well as she gets up every 2 hours.  She had a CT head in 2017 that was unremarkable.  Blood work done recently showed a normal B12, normal vitamin D level and TSH.  ESR and CRP were also normal.  LDL was elevated at 138 and she is trying to make dietary modifications      The following portions of the patient's history were reviewed today and updated as of 08/31/2021  : allergies, current medications, past family history, past medical history, past social history, past surgical history and problem list  These document will be scanned to patient's chart.      Current Outpatient Medications:   •  ALPRAZolam (Xanax) 0.5 MG tablet, Xanax 0.5 mg tablet  At Onset--mariano, Disp: , Rfl:   •  Biotin (BIOTIN 5000) 5 MG capsule, Take 10,000 mg by mouth Daily. Takes 2 tablets daily, Disp: , Rfl:   •  buPROPion XL (WELLBUTRIN XL) 300 MG 24 hr tablet, Take 300 mg by mouth Daily., Disp: , Rfl:   •  Calcium Carb-Cholecalciferol (CALCIUM 600 + D PO), Take 1 tablet by mouth Daily., Disp: , Rfl:   •  cyanocobalamin 1000 MCG/ML injection, , Disp: , Rfl:   •  Diclofenac Sodium (Voltaren) 1 % gel gel, Voltaren 1 % topical gel  APPLY 1 GRAM TO THE AFFECTED AREA(S) BY TOPICAL ROUTE 4 TIMES PER DAY PRN, Disp: , Rfl:   •  diphenhydrAMINE (BENADRYL) 25 mg capsule, Take 25 mg by mouth At Night As Needed for Itching., Disp: , Rfl:   •  DULoxetine (CYMBALTA) 60 MG capsule, , Disp: , Rfl:   •  esomeprazole (nexIUM) 40 MG capsule, Take 40 mg by mouth Every Morning Before Breakfast., Disp: , Rfl:   •  lisinopril-hydrochlorothiazide (PRINZIDE,ZESTORETIC) 10-12.5 MG per tablet, lisinopril 10 mg-hydrochlorothiazide 12.5 mg tablet  TAKE ONE TABLET BY MOUTH DAILY, Disp: , Rfl:   •  Magnesium 500 MG capsule, Take 1  tablet by mouth Daily., Disp: , Rfl:   •  methocarbamol (ROBAXIN) 500 MG tablet, methocarbamol 500 mg tablet  TAKE ONE TABLET BY MOUTH THREE TIMES A DAY*NEEDS TELEHEALTH APPOINTMENT FOR MORE REFILLS*, Disp: , Rfl:   •  montelukast (SINGULAIR) 10 MG tablet, , Disp: , Rfl:   •  Multiple Vitamins-Minerals (MULTIVITAMIN WITH MINERALS) tablet tablet, Take 1 tablet by mouth Daily., Disp: , Rfl:   •  ondansetron ODT (ZOFRAN-ODT) 8 MG disintegrating tablet, Take 1 tablet by mouth Every 8 (Eight) Hours As Needed for nausea or vomiting., Disp: 16 tablet, Rfl: 0  •  oxyCODONE-acetaminophen (PERCOCET) 7.5-325 MG per tablet, Take 1 tablet by mouth Every 6 (Six) Hours As Needed., Disp: , Rfl:   •  Potassium Gluconate 550 MG tablet, potassium gluconate 550 mg (90 mg) tablet  Take by oral route., Disp: , Rfl:   •  rizatriptan (MAXALT) 10 MG tablet, Take 10 mg by mouth 1 (One) Time As Needed for Migraine. May repeat in 2 hours if needed, Disp: , Rfl:   •  topiramate (Topamax) 100 MG tablet, Take 1 tablet by mouth 2 (Two) Times a Day., Disp: 60 tablet, Rfl: 5  •  Turmeric Curcumin 500 MG capsule, Take 1 tablet by mouth Daily., Disp: , Rfl:   •  Valerian 450 MG capsule, Take 1 tablet by mouth Daily., Disp: , Rfl:   •  Wheat Dextrin (BENEFIBER DRINK MIX PO), Benefiber, Disp: , Rfl:    Past Medical History:   Diagnosis Date   • Anxiety    • Arthritis    • Depression    • Kimberly-Danlos disease    • GERD (gastroesophageal reflux disease)    • GI bleed    • History of transfusion     2 UNITS   • Migraine    • Restless legs    • Wears eyeglasses       Past Surgical History:   Procedure Laterality Date   • BACK SURGERY     • CHOLECYSTECTOMY N/A 6/7/2018    Procedure: CHOLECYSTECTOMY LAPAROSCOPIC;  Surgeon: Romana Torre MD;  Location: [facility];  Service: General   • COLONOSCOPY     • DILATATION AND CURETTAGE     • ENDOSCOPY     • GASTRIC BYPASS  2002   • HYSTERECTOMY     • ROTATOR CUFF REPAIR      bilateral   • TONSILLECTOMY     •  "TOTAL HIP ARTHROPLASTY Left 10/4/2016    Procedure: TOTAL HIP ARTHROPLASTY LEFT;  Surgeon: Arvind Oh MD;  Location: [facility] OR;  Service:    • TOTAL HIP ARTHROPLASTY Left       Family History   Problem Relation Age of Onset   • Breast cancer Maternal Grandmother 50   • Dementia Mother    • Lung cancer Mother    • Heart failure Father    • Ovarian cancer Neg Hx       Social History     Socioeconomic History   • Marital status:    Tobacco Use   • Smoking status: Never Smoker   • Smokeless tobacco: Never Used   Vaping Use   • Vaping Use: Never used   Substance and Sexual Activity   • Alcohol use: No   • Drug use: No   • Sexual activity: Defer     Birth control/protection: Post-menopausal     Review of Systems   Musculoskeletal: Positive for arthralgias and back pain.   Neurological: Positive for headache.   All other systems reviewed and are negative.      Objective:    /80   Pulse 99   Ht 165.1 cm (65\")   Wt 65 kg (143 lb 3.2 oz)   SpO2 99%   Breastfeeding No   BMI 23.83 kg/m²     Neurology Exam:    General apperance: NAD.  Tenderness to palpation of the occipital regions bilaterally    Mental status: Alert, awake and oriented to time place and person.    Recent and Remote memory: Intact.    Attention span and Concentration: Normal.     Language and Speech: Intact- No dysarthria.    Fluency, Naming , Repitition and Comprehension:  Intact    Cranial Nerves:   CN II: Visual fields are full. Intact. Fundi - Normal, No papillederma, Pupils - PUSHPA  CN III, IV and VI: Extraocular movements are intact. Normal saccades.   CN V: Facial sensation is intact.   CN VII: Muscles of facial expression reveal no asymmetry. Intact.   CN VIII: Hearing is intact. Whispered voice intact.   CN IX and X: Palate elevates symmetrically. Intact  CN XI: Shoulder shrug is intact.   CN XII: Tongue is midline without evidence of atrophy or fasciculation.     Ophthalmoscopic exam of optic disc-normal    Motor:  Right UE " muscle strength 5/5. Normal tone.     Left UE muscle strength 5/5. Normal tone.      Right LE muscle strength5/5.  4/5 DF. Normal tone. Wears bilateral AFOs for foot drops    Left LE muscle strength 5/5.4/5 DF . Normal tone.      Sensory: Normal light touch, vibration and pinprick sensation bilaterally.    DTRs: 2+ bilaterally in upper and lower extremities.    Babinski: Negative bilaterally.    Co-ordination: Normal finger-to-nose, heel to shin B/L.    Rhomberg: Negative.    Gait: Slightly broad-based    Cardiovascular: Regular rate and rhythm without murmur, gallop or rub.    Assessment and Plan:  1. Migraine with aura and without status migrainosus, not intractable  Patient has a combination of migraine and cervico-occipital headaches   I have asked her to continue Topamax 100 mg twice a day  For abortive treatment of her headaches she can continue taking Maxalt 10 mg and Zofran  She should also keep her self well-hydrated while on Topamax    2. Bilateral occipital neuralgia  I gave her a bilateral occipital nerve block today which she tolerated well       Return in about 6 weeks (around 11/23/2021).         Thea Montiel MD

## 2021-11-23 ENCOUNTER — OFFICE VISIT (OUTPATIENT)
Dept: NEUROLOGY | Facility: CLINIC | Age: 56
End: 2021-11-23

## 2021-11-23 VITALS
DIASTOLIC BLOOD PRESSURE: 80 MMHG | OXYGEN SATURATION: 99 % | WEIGHT: 143 LBS | HEIGHT: 65 IN | SYSTOLIC BLOOD PRESSURE: 122 MMHG | BODY MASS INDEX: 23.82 KG/M2 | HEART RATE: 84 BPM

## 2021-11-23 DIAGNOSIS — G43.109 MIGRAINE WITH AURA AND WITHOUT STATUS MIGRAINOSUS, NOT INTRACTABLE: Primary | ICD-10-CM

## 2021-11-23 DIAGNOSIS — M54.81 BILATERAL OCCIPITAL NEURALGIA: ICD-10-CM

## 2021-11-23 PROCEDURE — 99213 OFFICE O/P EST LOW 20 MIN: CPT | Performed by: PSYCHIATRY & NEUROLOGY

## 2021-11-23 PROCEDURE — 64405 NJX AA&/STRD GR OCPL NRV: CPT | Performed by: PSYCHIATRY & NEUROLOGY

## 2021-11-23 RX ORDER — TOPIRAMATE 25 MG/1
TABLET ORAL
Qty: 60 TABLET | Refills: 5 | Status: SHIPPED | OUTPATIENT
Start: 2021-11-23 | End: 2022-03-10

## 2021-11-23 NOTE — PROGRESS NOTES
Subjective:    CC: Yumiko Oswald is seen today for Migraine     Current visit-patient states that after the nerve block her occipital headaches remained well controlled for 4 weeks but she started having them again in the last 2 weeks with tenderness around her ears.  In fact she has difficulty laying on her side due to the pain.  For her neck pain she is going to have another rhizotomy next year.  She is still getting about 1 migraine headache each week for which she takes Zofran and Maxalt.  But last month she had a 3-day long headache.  Continues to take Topamax 100 mg twice a day.  Second occipital nerve block given today-  Procedure note for occipital nerve block-The patient's chart was reviewed.  After obtaining verbal consent the patient was placed in a sitting position with her neck flexed and her chin touching her chest.  Both the left and right occipital areas were prepped with antiseptic solution and injected with bupivacaine 0.5% using a 27-gauge needle.  3 cc of bupivacaine was injected at the site of the greater occipital nerve on each side and  2 cc was injected in the lesser occipital nerve on each side.  Patient tolerated the procedure well    Last visit-since the last visit patient initially increased her Topamax to 75 mg twice daily but noticed no improvement in her headaches therefore a few weeks ago she increased it to 100 mg twice a day. She states that her migraine headaches have improved with Topamax but she continues to have the headaches in the back of her head and neck that radiate up on both sides around her ears and occasionally to her jaw. Therefore I gave her her first occipital nerve block today    Initial [id number]-old female with a history of Kimberly-Danlos syndrome, hypertension, fibromyalgia, arthritis status post bilateral shoulder and left hip surgery, anxiety, depression, back pain status post L5/S1 surgery presents with headaches.  As per patient she has had headaches  for over 25 years.  She would previously get blurred/tunnel vision 30 minutes before the headache started but now she sees red and black images.  The headaches are usually on one side, throbbing in nature with nausea, photophobia, phonophobia and ringing in her years.  She takes Maxalt and Zofran for them which helps.  Was also started on Topamax 50 mg twice a day about 5 years ago.  She currently has a severe headache about once a week lasting for a day a few dull headaches each week in the back of her head and neck which she attributes to arthritis neck.  Has had several bouts of PT which have helped some.  Is also on a muscle relaxer in addition to oxycodone for arthritis now.  Avoids taking NSAIDs as she has had a GI bleed before.  Reports to having sleep disturbances as well as she gets up every 2 hours.  She had a CT head in 2017 that was unremarkable.  Blood work done recently showed a normal B12, normal vitamin D level and TSH.  ESR and CRP were also normal.  LDL was elevated at 138 and she is trying to make dietary modifications      The following portions of the patient's history were reviewed today and updated as of 08/31/2021  : allergies, current medications, past family history, past medical history, past social history, past surgical history and problem list  These document will be scanned to patient's chart.      Current Outpatient Medications:   •  ALPRAZolam (Xanax) 0.5 MG tablet, Xanax 0.5 mg tablet  At Onset--mariano, Disp: , Rfl:   •  Biotin (BIOTIN 5000) 5 MG capsule, Take 10,000 mg by mouth Daily. Takes 2 tablets daily, Disp: , Rfl:   •  buPROPion XL (WELLBUTRIN XL) 300 MG 24 hr tablet, Take 300 mg by mouth Daily., Disp: , Rfl:   •  Calcium Carb-Cholecalciferol (CALCIUM 600 + D PO), Take 1 tablet by mouth Daily., Disp: , Rfl:   •  cyanocobalamin 1000 MCG/ML injection, , Disp: , Rfl:   •  Diclofenac Sodium (Voltaren) 1 % gel gel, Voltaren 1 % topical gel  APPLY 1 GRAM TO THE AFFECTED AREA(S) BY  TOPICAL ROUTE 4 TIMES PER DAY PRN, Disp: , Rfl:   •  diphenhydrAMINE (BENADRYL) 25 mg capsule, Take 25 mg by mouth At Night As Needed for Itching., Disp: , Rfl:   •  DULoxetine (CYMBALTA) 60 MG capsule, , Disp: , Rfl:   •  esomeprazole (nexIUM) 40 MG capsule, Take 40 mg by mouth Every Morning Before Breakfast., Disp: , Rfl:   •  Magnesium 500 MG capsule, Take 1 tablet by mouth Daily., Disp: , Rfl:   •  methocarbamol (ROBAXIN) 500 MG tablet, methocarbamol 500 mg tablet  TAKE ONE TABLET BY MOUTH THREE TIMES A DAY*NEEDS TELEHEALTH APPOINTMENT FOR MORE REFILLS*, Disp: , Rfl:   •  montelukast (SINGULAIR) 10 MG tablet, , Disp: , Rfl:   •  Multiple Vitamins-Minerals (MULTIVITAMIN WITH MINERALS) tablet tablet, Take 1 tablet by mouth Daily., Disp: , Rfl:   •  ondansetron ODT (ZOFRAN-ODT) 8 MG disintegrating tablet, Take 1 tablet by mouth Every 8 (Eight) Hours As Needed for nausea or vomiting., Disp: 16 tablet, Rfl: 0  •  oxyCODONE-acetaminophen (PERCOCET) 7.5-325 MG per tablet, Take 1 tablet by mouth Every 6 (Six) Hours As Needed., Disp: , Rfl:   •  Potassium Gluconate 550 MG tablet, potassium gluconate 550 mg (90 mg) tablet  Take by oral route., Disp: , Rfl:   •  rizatriptan (MAXALT) 10 MG tablet, Take 10 mg by mouth 1 (One) Time As Needed for Migraine. May repeat in 2 hours if needed, Disp: , Rfl:   •  topiramate (Topamax) 100 MG tablet, Take 1 tablet by mouth 2 (Two) Times a Day., Disp: 60 tablet, Rfl: 5  •  Turmeric Curcumin 500 MG capsule, Take 1 tablet by mouth Daily., Disp: , Rfl:   •  Valerian 450 MG capsule, Take 1 tablet by mouth Daily., Disp: , Rfl:   •  Wheat Dextrin (BENEFIBER DRINK MIX PO), Benefiber, Disp: , Rfl:   •  topiramate (Topamax) 25 MG tablet, Take 1 tablet twice a day, Disp: 60 tablet, Rfl: 5   Past Medical History:   Diagnosis Date   • Anxiety    • Arthritis    • Depression    • Kimberly-Danlos disease    • GERD (gastroesophageal reflux disease)    • GI bleed    • History of transfusion     2 UNITS  "  • Migraine    • Restless legs    • Wears eyeglasses       Past Surgical History:   Procedure Laterality Date   • BACK SURGERY     • CHOLECYSTECTOMY N/A 6/7/2018    Procedure: CHOLECYSTECTOMY LAPAROSCOPIC;  Surgeon: Romana Torre MD;  Location: [facility];  Service: General   • COLONOSCOPY     • DILATATION AND CURETTAGE     • ENDOSCOPY     • GASTRIC BYPASS  2002   • HYSTERECTOMY     • ROTATOR CUFF REPAIR      bilateral   • TONSILLECTOMY     • TOTAL HIP ARTHROPLASTY Left 10/4/2016    Procedure: TOTAL HIP ARTHROPLASTY LEFT;  Surgeon: Arvind Oh MD;  Location: [facility];  Service:    • TOTAL HIP ARTHROPLASTY Left       Family History   Problem Relation Age of Onset   • Breast cancer Maternal Grandmother 50   • Dementia Mother    • Lung cancer Mother    • Heart failure Father    • Ovarian cancer Neg Hx       Social History     Socioeconomic History   • Marital status:    Tobacco Use   • Smoking status: Never Smoker   • Smokeless tobacco: Never Used   Vaping Use   • Vaping Use: Never used   Substance and Sexual Activity   • Alcohol use: No   • Drug use: No   • Sexual activity: Defer     Birth control/protection: Post-menopausal     Review of Systems   Musculoskeletal: Positive for arthralgias and back pain.   Neurological: Positive for headache.   All other systems reviewed and are negative.      Objective:    /80   Pulse 84   Ht 165.1 cm (65\")   Wt 64.9 kg (143 lb)   SpO2 99%   BMI 23.80 kg/m²     Neurology Exam:    General apperance: NAD.  Tenderness to palpation of the occipital regions bilaterally    Mental status: Alert, awake and oriented to time place and person.    Recent and Remote memory: Intact.    Attention span and Concentration: Normal.     Language and Speech: Intact- No dysarthria.    Fluency, Naming , Repitition and Comprehension:  Intact    Cranial Nerves:   CN II: Visual fields are full. Intact. Fundi - Normal, No papillederma, Pupils - PUSHPA  CN III, IV and VI: " Extraocular movements are intact. Normal saccades.   CN V: Facial sensation is intact.   CN VII: Muscles of facial expression reveal no asymmetry. Intact.   CN VIII: Hearing is intact. Whispered voice intact.   CN IX and X: Palate elevates symmetrically. Intact  CN XI: Shoulder shrug is intact.   CN XII: Tongue is midline without evidence of atrophy or fasciculation.     Ophthalmoscopic exam of optic disc-normal    Motor:  Right UE muscle strength 5/5. Normal tone.     Left UE muscle strength 5/5. Normal tone.      Right LE muscle strength5/5.  4/5 DF. Normal tone. Wears bilateral AFOs for foot drops    Left LE muscle strength 5/5.4/5 DF . Normal tone.      Sensory: Normal light touch, vibration and pinprick sensation bilaterally.    DTRs: 2+ bilaterally in upper and lower extremities.    Babinski: Negative bilaterally.    Co-ordination: Normal finger-to-nose, heel to shin B/L.    Rhomberg: Negative.    Gait: Slightly broad-based    Cardiovascular: Regular rate and rhythm without murmur, gallop or rub.    Assessment and Plan:  1. Migraine with aura and without status migrainosus, not intractable  Patient has a combination of migraine and cervico-occipital headaches   I have asked her to increase her Topamax to 125 mg twice a day  For abortive treatment of her headaches she can continue taking Maxalt 10 mg and Zofran  She should also keep her self well-hydrated while on Topamax    2. Bilateral occipital neuralgia  I gave her a bilateral occipital nerve block today which she tolerated well       Return in about 2 months (around 1/23/2022).         Thea Montiel MD

## 2022-01-27 ENCOUNTER — OFFICE VISIT (OUTPATIENT)
Dept: NEUROLOGY | Facility: CLINIC | Age: 57
End: 2022-01-27

## 2022-01-27 VITALS
HEIGHT: 65 IN | SYSTOLIC BLOOD PRESSURE: 140 MMHG | HEART RATE: 86 BPM | WEIGHT: 147 LBS | OXYGEN SATURATION: 100 % | DIASTOLIC BLOOD PRESSURE: 90 MMHG | BODY MASS INDEX: 24.49 KG/M2

## 2022-01-27 DIAGNOSIS — G43.109 MIGRAINE WITH AURA AND WITHOUT STATUS MIGRAINOSUS, NOT INTRACTABLE: Primary | ICD-10-CM

## 2022-01-27 DIAGNOSIS — M54.81 BILATERAL OCCIPITAL NEURALGIA: ICD-10-CM

## 2022-01-27 PROCEDURE — 99214 OFFICE O/P EST MOD 30 MIN: CPT | Performed by: PSYCHIATRY & NEUROLOGY

## 2022-01-27 PROCEDURE — 64405 NJX AA&/STRD GR OCPL NRV: CPT | Performed by: PSYCHIATRY & NEUROLOGY

## 2022-01-27 PROCEDURE — 64450 NJX AA&/STRD OTHER PN/BRANCH: CPT | Performed by: PSYCHIATRY & NEUROLOGY

## 2022-01-27 RX ORDER — TIZANIDINE 2 MG/1
TABLET ORAL EVERY 6 HOURS
COMMUNITY

## 2022-01-27 RX ORDER — AMITRIPTYLINE HYDROCHLORIDE 10 MG/1
10 TABLET, FILM COATED ORAL NIGHTLY
Qty: 30 TABLET | Refills: 5 | Status: SHIPPED | OUTPATIENT
Start: 2022-01-27 | End: 2022-02-18

## 2022-01-27 RX ORDER — MELOXICAM 15 MG/1
TABLET ORAL
COMMUNITY

## 2022-01-27 NOTE — PROGRESS NOTES
Subjective:    CC: Yumiko Oswald is seen today for Migraine     Current visit-patient states that she is having frequent occipital headaches as the last nerve block did not help much.  The pain is radiating into her ears.  Her rhizotomy is not scheduled until April.  Her migraine headaches have improved since increasing Topamax to 125 mg twice a day.  She is still having about 1 migraine a week for which she takes Maxalt plus Zofran.  She is also having difficulty sleeping at night due to her headaches, neck pain and diffuse arthralgias.  Of note-patient was complaining of a severe headache today.  I gave her Ubrelvy 100 mg which helped with the severity of the headache.  Once it was less severe than about 15 minutes I gave her the third occipital nerve block.    Procedure note for occipital nerve block-The patient's chart was reviewed.  After obtaining verbal consent the patient was placed in a sitting position with her neck flexed and her chin touching her chest.  Both the left and right occipital areas were prepped with antiseptic solution and injected with bupivacaine 0.5% using a 27-gauge needle.  3 cc of bupivacaine was injected at the site of the greater occipital nerve on each side and  2 cc was injected in the lesser occipital nerve on each side.  Patient tolerated the procedure well    Last visit-patient states that after the nerve block her occipital headaches remained well controlled for 4 weeks but she started having them again in the last 2 weeks with tenderness around her ears.  In fact she has difficulty laying on her side due to the pain.  For her neck pain she is going to have another rhizotomy next year.  She is still getting about 1 migraine headache each week for which she takes Zofran and Maxalt.  But last month she had a 3-day long headache.  Continues to take Topamax 100 mg twice a day.  Second occipital nerve block given today.    Last visit-since the last visit patient initially  increased her Topamax to 75 mg twice daily but noticed no improvement in her headaches therefore a few weeks ago she increased it to 100 mg twice a day. She states that her migraine headaches have improved with Topamax but she continues to have the headaches in the back of her head and neck that radiate up on both sides around her ears and occasionally to her jaw. Therefore I gave her her first occipital nerve block today    Initial [id number]-old female with a history of Kimberly-Danlos syndrome, hypertension, fibromyalgia, arthritis status post bilateral shoulder and left hip surgery, anxiety, depression, back pain status post L5/S1 surgery presents with headaches.  As per patient she has had headaches for over 25 years.  She would previously get blurred/tunnel vision 30 minutes before the headache started but now she sees red and black images.  The headaches are usually on one side, throbbing in nature with nausea, photophobia, phonophobia and ringing in her years.  She takes Maxalt and Zofran for them which helps.  Was also started on Topamax 50 mg twice a day about 5 years ago.  She currently has a severe headache about once a week lasting for a day a few dull headaches each week in the back of her head and neck which she attributes to arthritis neck.  Has had several bouts of PT which have helped some.  Is also on a muscle relaxer in addition to oxycodone for arthritis now.  Avoids taking NSAIDs as she has had a GI bleed before.  Reports to having sleep disturbances as well as she gets up every 2 hours.  She had a CT head in 2017 that was unremarkable.  Blood work done recently showed a normal B12, normal vitamin D level and TSH.  ESR and CRP were also normal.  LDL was elevated at 138 and she is trying to make dietary modifications      The following portions of the patient's history were reviewed today and updated as of 08/31/2021  : allergies, current medications, past family history, past medical history,  past social history, past surgical history and problem list  These document will be scanned to patient's chart.      Current Outpatient Medications:   •  ALPRAZolam (Xanax) 0.5 MG tablet, Xanax 0.5 mg tablet  At Onset--sammiesch, Disp: , Rfl:   •  Biotin (BIOTIN 5000) 5 MG capsule, Take 10,000 mg by mouth Daily. Takes 2 tablets daily, Disp: , Rfl:   •  buPROPion XL (WELLBUTRIN XL) 300 MG 24 hr tablet, Take 300 mg by mouth Daily., Disp: , Rfl:   •  Calcium Carb-Cholecalciferol (CALCIUM 600 + D PO), Take 1 tablet by mouth Daily., Disp: , Rfl:   •  cyanocobalamin 1000 MCG/ML injection, , Disp: , Rfl:   •  Diclofenac Sodium (Voltaren) 1 % gel gel, Voltaren 1 % topical gel  APPLY 1 GRAM TO THE AFFECTED AREA(S) BY TOPICAL ROUTE 4 TIMES PER DAY PRN, Disp: , Rfl:   •  diphenhydrAMINE (BENADRYL) 25 mg capsule, Take 25 mg by mouth At Night As Needed for Itching., Disp: , Rfl:   •  DULoxetine (CYMBALTA) 60 MG capsule, , Disp: , Rfl:   •  esomeprazole (nexIUM) 40 MG capsule, Take 40 mg by mouth Every Morning Before Breakfast., Disp: , Rfl:   •  Magnesium 500 MG capsule, Take 1 tablet by mouth Daily., Disp: , Rfl:   •  meloxicam (MOBIC) 15 MG tablet, meloxicam 15 mg tablet  Take 1 tablet every day by oral route., Disp: , Rfl:   •  montelukast (SINGULAIR) 10 MG tablet, , Disp: , Rfl:   •  Multiple Vitamins-Minerals (MULTIVITAMIN WITH MINERALS) tablet tablet, Take 1 tablet by mouth Daily., Disp: , Rfl:   •  ondansetron ODT (ZOFRAN-ODT) 8 MG disintegrating tablet, Take 1 tablet by mouth Every 8 (Eight) Hours As Needed for nausea or vomiting., Disp: 16 tablet, Rfl: 0  •  oxyCODONE-acetaminophen (PERCOCET) 7.5-325 MG per tablet, Take 1 tablet by mouth Every 6 (Six) Hours As Needed., Disp: , Rfl:   •  Potassium Gluconate 550 MG tablet, potassium gluconate 550 mg (90 mg) tablet  Take by oral route., Disp: , Rfl:   •  rizatriptan (MAXALT) 10 MG tablet, Take 10 mg by mouth 1 (One) Time As Needed for Migraine. May repeat in 2 hours if needed,  Disp: , Rfl:   •  tiZANidine (ZANAFLEX) 2 MG tablet, Every 6 (Six) Hours., Disp: , Rfl:   •  topiramate (Topamax) 100 MG tablet, Take 1 tablet by mouth 2 (Two) Times a Day., Disp: 60 tablet, Rfl: 5  •  topiramate (Topamax) 25 MG tablet, Take 1 tablet twice a day, Disp: 60 tablet, Rfl: 5  •  Turmeric Curcumin 500 MG capsule, Take 1 tablet by mouth Daily., Disp: , Rfl:   •  Valerian 450 MG capsule, Take 1 tablet by mouth Daily., Disp: , Rfl:   •  Wheat Dextrin (BENEFIBER DRINK MIX PO), Benefiber, Disp: , Rfl:   •  amitriptyline (ELAVIL) 10 MG tablet, Take 1 tablet by mouth Every Night., Disp: 30 tablet, Rfl: 5   Past Medical History:   Diagnosis Date   • Anxiety    • Arthritis    • Depression    • Kimberly-Danlos disease    • GERD (gastroesophageal reflux disease)    • GI bleed    • History of transfusion     2 UNITS   • Migraine    • Restless legs    • Wears eyeglasses       Past Surgical History:   Procedure Laterality Date   • BACK SURGERY     • CHOLECYSTECTOMY N/A 6/7/2018    Procedure: CHOLECYSTECTOMY LAPAROSCOPIC;  Surgeon: Romana Torre MD;  Location:  [facility];  Service: General   • COLONOSCOPY     • DILATATION AND CURETTAGE     • ENDOSCOPY     • GASTRIC BYPASS  2002   • HYSTERECTOMY     • ROTATOR CUFF REPAIR      bilateral   • TONSILLECTOMY     • TOTAL HIP ARTHROPLASTY Left 10/4/2016    Procedure: TOTAL HIP ARTHROPLASTY LEFT;  Surgeon: Arvind Oh MD;  Location:  [facility];  Service:    • TOTAL HIP ARTHROPLASTY Left       Family History   Problem Relation Age of Onset   • Breast cancer Maternal Grandmother 50   • Dementia Mother    • Lung cancer Mother    • Heart failure Father    • Ovarian cancer Neg Hx       Social History     Socioeconomic History   • Marital status:    Tobacco Use   • Smoking status: Never Smoker   • Smokeless tobacco: Never Used   Vaping Use   • Vaping Use: Never used   Substance and Sexual Activity   • Alcohol use: No   • Drug use: No   • Sexual activity: Defer      "Birth control/protection: Post-menopausal     Review of Systems   Musculoskeletal: Positive for arthralgias and back pain.   Neurological: Positive for headache.   All other systems reviewed and are negative.      Objective:    /90   Pulse 86   Ht 165.1 cm (65\")   Wt 66.7 kg (147 lb)   SpO2 100%   BMI 24.46 kg/m²     Neurology Exam:    General apperance: NAD.  Tenderness to palpation of the occipital regions bilaterally    Mental status: Alert, awake and oriented to time place and person.    Recent and Remote memory: Intact.    Attention span and Concentration: Normal.     Language and Speech: Intact- No dysarthria.    Fluency, Naming , Repitition and Comprehension:  Intact    Cranial Nerves:   CN II: Visual fields are full. Intact. Fundi - Normal, No papillederma, Pupils - PUSHPA  CN III, IV and VI: Extraocular movements are intact. Normal saccades.   CN V: Facial sensation is intact.   CN VII: Muscles of facial expression reveal no asymmetry. Intact.   CN VIII: Hearing is intact. Whispered voice intact.   CN IX and X: Palate elevates symmetrically. Intact  CN XI: Shoulder shrug is intact.   CN XII: Tongue is midline without evidence of atrophy or fasciculation.     Ophthalmoscopic exam of optic disc-normal    Motor:  Right UE muscle strength 5/5. Normal tone.     Left UE muscle strength 5/5. Normal tone.      Right LE muscle strength5/5.  4/5 DF. Normal tone. Wears bilateral AFOs for foot drops    Left LE muscle strength 5/5.4/5 DF . Normal tone.      Sensory: Normal light touch, vibration and pinprick sensation bilaterally.    DTRs: 2+ bilaterally in upper and lower extremities.    Babinski: Negative bilaterally.    Co-ordination: Normal finger-to-nose, heel to shin B/L.    Rhomberg: Negative.    Gait: Slightly broad-based    Cardiovascular: Regular rate and rhythm without murmur, gallop or rub.    Assessment and Plan:  1. Migraine with aura and without status migrainosus, not intractable  Patient has a " combination of migraine and cervico-occipital headaches   She should continue Topamax to 125 mg twice a day  I will also start her on amitriptyline 10 mg nightly  For abortive treatment of her headaches she can continue taking Maxalt 10 mg and Zofran  She should also keep her self well-hydrated while on Topamax    2. Bilateral occipital neuralgia  I gave her a bilateral occipital nerve block today which she tolerated well       Return in about 6 weeks (around 3/10/2022) for ONB.         Thea Montiel MD

## 2022-02-18 ENCOUNTER — TELEPHONE (OUTPATIENT)
Dept: NEUROLOGY | Facility: CLINIC | Age: 57
End: 2022-02-18

## 2022-02-18 RX ORDER — AMITRIPTYLINE HYDROCHLORIDE 25 MG/1
25 TABLET, FILM COATED ORAL NIGHTLY
Qty: 30 TABLET | Refills: 5 | Status: SHIPPED | OUTPATIENT
Start: 2022-02-18 | End: 2022-09-19

## 2022-02-18 NOTE — TELEPHONE ENCOUNTER
DR SARA SOUSA    SELF    [phone]    DR RUIZ HAD PRESCRIBED AMITRIPTYLINE TO HELP PATIENT SLEEP BETTER. SHE'S TRIED IT FOR ABOUT 2 WEEKS. SHE THINKS IT HELPED WITH FALLING ASLEEP BUT SHE STILL HAS PROBLEMS WITH STAYING ASLEEP. SAYS SHE'S UP EVERY COUPLE OF HOURS. PLEASE ADVISE IF SHOULD INCREASE RX.

## 2022-02-18 NOTE — TELEPHONE ENCOUNTER
Please let the patient know that I have increased her dose of amitriptyline.  She will now be taking 1 tablet of 25 mg at night.  I have sent the new prescription to her pharmacy

## 2022-02-28 ENCOUNTER — TELEPHONE (OUTPATIENT)
Dept: NEUROLOGY | Facility: CLINIC | Age: 57
End: 2022-02-28

## 2022-02-28 NOTE — TELEPHONE ENCOUNTER
SARA SOUSA    SELF    [phone]    FYI - PATIENT CALLED WITH UPDATE ON AMITRIPTYLINE INCREASE. SHE SAYS SHE DOES THINK IT'S HELPED SOME BUT SHE IS STILL UP DURING THE NIGHT BECAUSE OF HER HURT SHOULDER. SHE THINKS SHE WILL NOTICE AN IMPROVEMENT ONCE SHE HAS ABLATION ON HER NECK AND THEN LOWER BACK

## 2022-03-10 ENCOUNTER — OFFICE VISIT (OUTPATIENT)
Dept: NEUROLOGY | Facility: CLINIC | Age: 57
End: 2022-03-10

## 2022-03-10 VITALS
SYSTOLIC BLOOD PRESSURE: 130 MMHG | WEIGHT: 136 LBS | OXYGEN SATURATION: 99 % | DIASTOLIC BLOOD PRESSURE: 80 MMHG | HEART RATE: 100 BPM | HEIGHT: 65 IN | BODY MASS INDEX: 22.66 KG/M2

## 2022-03-10 DIAGNOSIS — G43.109 MIGRAINE WITH AURA AND WITHOUT STATUS MIGRAINOSUS, NOT INTRACTABLE: Primary | ICD-10-CM

## 2022-03-10 DIAGNOSIS — M54.81 BILATERAL OCCIPITAL NEURALGIA: ICD-10-CM

## 2022-03-10 PROCEDURE — 99213 OFFICE O/P EST LOW 20 MIN: CPT | Performed by: PSYCHIATRY & NEUROLOGY

## 2022-03-10 RX ORDER — TOPIRAMATE 25 MG/1
TABLET ORAL
Qty: 60 TABLET | Refills: 5 | Status: SHIPPED | OUTPATIENT
Start: 2022-03-10 | End: 2022-05-10 | Stop reason: SDUPTHER

## 2022-03-10 RX ORDER — TOPIRAMATE 100 MG/1
100 TABLET, FILM COATED ORAL 2 TIMES DAILY
Qty: 60 TABLET | Refills: 5 | Status: SHIPPED | OUTPATIENT
Start: 2022-03-10 | End: 2022-09-21

## 2022-03-10 NOTE — PROGRESS NOTES
Subjective:    CC: Yumiko Oswald is seen today for Migraine     Current visit-patient states that her headaches have improved minimally since starting amitriptyline however her sleep is better.  She is currently taking a dose of 25 mg nightly as the 10 mg dose did not help at all.  She is still waiting for her rhizotomy which is scheduled for next month.  She is having constant neck pain/shoulder pain secondary to her Kimberly-Danlos syndrome radiating into the back of her head.  The occipital nerve block only helped some.  She has about 1 migraine headache a week.  Continues to take Topamax 125 mg twice a day.     Last visit-patient states that she is having frequent occipital headaches as the last nerve block did not help much.  The pain is radiating into her ears.  Her rhizotomy is not scheduled until April.  Her migraine headaches have improved since increasing Topamax to 125 mg twice a day.  She is still having about 1 migraine a week for which she takes Maxalt plus Zofran.  She is also having difficulty sleeping at night due to her headaches, neck pain and diffuse arthralgias.  Of note-patient was complaining of a severe headache today.  I gave her Ubrelvy 100 mg which helped with the severity of the headache.  Once it was less severe than about 15 minutes I gave her the third occipital nerve block.    Procedure note for occipital nerve block-The patient's chart was reviewed.  After obtaining verbal consent the patient was placed in a sitting position with her neck flexed and her chin touching her chest.  Both the left and right occipital areas were prepped with antiseptic solution and injected with bupivacaine 0.5% using a 27-gauge needle.  3 cc of bupivacaine was injected at the site of the greater occipital nerve on each side and  2 cc was injected in the lesser occipital nerve on each side.  Patient tolerated the procedure well    Last visit-patient states that after the nerve block her occipital  headaches remained well controlled for 4 weeks but she started having them again in the last 2 weeks with tenderness around her ears.  In fact she has difficulty laying on her side due to the pain.  For her neck pain she is going to have another rhizotomy next year.  She is still getting about 1 migraine headache each week for which she takes Zofran and Maxalt.  But last month she had a 3-day long headache.  Continues to take Topamax 100 mg twice a day.  Second occipital nerve block given today.    Last visit-since the last visit patient initially increased her Topamax to 75 mg twice daily but noticed no improvement in her headaches therefore a few weeks ago she increased it to 100 mg twice a day. She states that her migraine headaches have improved with Topamax but she continues to have the headaches in the back of her head and neck that radiate up on both sides around her ears and occasionally to her jaw. Therefore I gave her her first occipital nerve block today    Initial [id number]-tums-old female with a history of Kimberly-Danlos syndrome, hypertension, fibromyalgia, arthritis status post bilateral shoulder and left hip surgery, anxiety, depression, back pain status post L5/S1 surgery presents with headaches.  As per patient she has had headaches for over 25 years.  She would previously get blurred/tunnel vision 30 minutes before the headache started but now she sees red and black images.  The headaches are usually on one side, throbbing in nature with nausea, photophobia, phonophobia and ringing in her years.  She takes Maxalt and Zofran for them which helps.  Was also started on Topamax 50 mg twice a day about 5 years ago.  She currently has a severe headache about once a week lasting for a day a few dull headaches each week in the back of her head and neck which she attributes to arthritis neck.  Has had several bouts of PT which have helped some.  Is also on a muscle relaxer in addition to oxycodone for  arthritis now.  Avoids taking NSAIDs as she has had a GI bleed before.  Reports to having sleep disturbances as well as she gets up every 2 hours.  She had a CT head in 2017 that was unremarkable.  Blood work done recently showed a normal B12, normal vitamin D level and TSH.  ESR and CRP were also normal.  LDL was elevated at 138 and she is trying to make dietary modifications      The following portions of the patient's history were reviewed today and updated as of 08/31/2021  : allergies, current medications, past family history, past medical history, past social history, past surgical history and problem list  These document will be scanned to patient's chart.      Current Outpatient Medications:   •  ALPRAZolam (XANAX) 0.5 MG tablet, Xanax 0.5 mg tablet  At Onset--mariano, Disp: , Rfl:   •  amitriptyline (ELAVIL) 25 MG tablet, Take 1 tablet by mouth Every Night., Disp: 30 tablet, Rfl: 5  •  Biotin 5 MG capsule, Take 10,000 mg by mouth Daily. Takes 2 tablets daily, Disp: , Rfl:   •  buPROPion XL (WELLBUTRIN XL) 300 MG 24 hr tablet, Take 300 mg by mouth Daily., Disp: , Rfl:   •  Calcium Carb-Cholecalciferol (CALCIUM 600 + D PO), Take 1 tablet by mouth Daily., Disp: , Rfl:   •  cyanocobalamin 1000 MCG/ML injection, 1,000 mcg Every 28 (Twenty-Eight) Days., Disp: , Rfl:   •  diphenhydrAMINE (BENADRYL) 25 mg capsule, Take 25 mg by mouth At Night As Needed for Itching., Disp: , Rfl:   •  DULoxetine (CYMBALTA) 60 MG capsule, Take 60 mg by mouth Daily., Disp: , Rfl:   •  esomeprazole (nexIUM) 40 MG capsule, Take 40 mg by mouth Every Morning Before Breakfast., Disp: , Rfl:   •  Magnesium 500 MG capsule, Take 1 tablet by mouth Daily., Disp: , Rfl:   •  meloxicam (MOBIC) 15 MG tablet, meloxicam 15 mg tablet  Take 1 tablet every day by oral route., Disp: , Rfl:   •  Multiple Vitamins-Minerals (MULTIVITAMIN WITH MINERALS) tablet tablet, Take 1 tablet by mouth Daily., Disp: , Rfl:   •  ondansetron ODT (ZOFRAN-ODT) 8 MG  disintegrating tablet, Take 1 tablet by mouth Every 8 (Eight) Hours As Needed for nausea or vomiting., Disp: 16 tablet, Rfl: 0  •  oxyCODONE-acetaminophen (PERCOCET) 7.5-325 MG per tablet, Take 1 tablet by mouth Every 6 (Six) Hours As Needed., Disp: , Rfl:   •  Potassium Gluconate 550 MG tablet, potassium gluconate 550 mg (90 mg) tablet  Take by oral route., Disp: , Rfl:   •  rizatriptan (MAXALT) 10 MG tablet, Take 10 mg by mouth 1 (One) Time As Needed for Migraine. May repeat in 2 hours if needed, Disp: , Rfl:   •  tiZANidine (ZANAFLEX) 2 MG tablet, Every 6 (Six) Hours., Disp: , Rfl:   •  topiramate (Topamax) 100 MG tablet, Take 1 tablet by mouth 2 (Two) Times a Day., Disp: 60 tablet, Rfl: 5  •  topiramate (Topamax) 25 MG tablet, Take 1 tablet twice a day, Disp: 60 tablet, Rfl: 5  •  Turmeric Curcumin 500 MG capsule, Take 1 tablet by mouth Daily., Disp: , Rfl:   •  Valerian 450 MG capsule, Take 1 tablet by mouth Daily., Disp: , Rfl:   •  Wheat Dextrin (BENEFIBER DRINK MIX PO), Benefiber, Disp: , Rfl:   •  montelukast (SINGULAIR) 10 MG tablet, , Disp: , Rfl:    Past Medical History:   Diagnosis Date   • Anxiety    • Arthritis    • Depression    • Kimberly-Danlos disease    • GERD (gastroesophageal reflux disease)    • GI bleed    • History of transfusion     2 UNITS   • Migraine    • Restless legs    • Wears eyeglasses       Past Surgical History:   Procedure Laterality Date   • BACK SURGERY     • CHOLECYSTECTOMY N/A 6/7/2018    Procedure: CHOLECYSTECTOMY LAPAROSCOPIC;  Surgeon: Romana Torre MD;  Location:  [facility];  Service: General   • COLONOSCOPY     • DILATATION AND CURETTAGE     • ENDOSCOPY     • GASTRIC BYPASS  2002   • HYSTERECTOMY     • ROTATOR CUFF REPAIR      bilateral   • TONSILLECTOMY     • TOTAL HIP ARTHROPLASTY Left 10/4/2016    Procedure: TOTAL HIP ARTHROPLASTY LEFT;  Surgeon: Arvind Oh MD;  Location:  [facility];  Service:    • TOTAL HIP ARTHROPLASTY Left       Family History   Problem  "Relation Age of Onset   • Breast cancer Maternal Grandmother 50   • Dementia Mother    • Lung cancer Mother    • Heart failure Father    • Ovarian cancer Neg Hx       Social History     Socioeconomic History   • Marital status:    Tobacco Use   • Smoking status: Never Smoker   • Smokeless tobacco: Never Used   Vaping Use   • Vaping Use: Never used   Substance and Sexual Activity   • Alcohol use: No   • Drug use: No   • Sexual activity: Defer     Birth control/protection: Post-menopausal     Review of Systems   Musculoskeletal: Positive for arthralgias and back pain.   Neurological: Positive for headache.   All other systems reviewed and are negative.      Objective:    /80   Pulse 100   Ht 165.1 cm (65\")   Wt 61.7 kg (136 lb)   SpO2 99%   BMI 22.63 kg/m²     Neurology Exam:    General apperance: NAD.  Tenderness to palpation of the occipital regions bilaterally    Mental status: Alert, awake and oriented to time place and person.    Recent and Remote memory: Intact.    Attention span and Concentration: Normal.     Language and Speech: Intact- No dysarthria.    Fluency, Naming , Repitition and Comprehension:  Intact    Cranial Nerves:   CN II: Visual fields are full. Intact. Fundi - Normal, No papillederma, Pupils - PUSHPA  CN III, IV and VI: Extraocular movements are intact. Normal saccades.   CN V: Facial sensation is intact.   CN VII: Muscles of facial expression reveal no asymmetry. Intact.   CN VIII: Hearing is intact. Whispered voice intact.   CN IX and X: Palate elevates symmetrically. Intact  CN XI: Shoulder shrug is intact.   CN XII: Tongue is midline without evidence of atrophy or fasciculation.     Ophthalmoscopic exam of optic disc-normal    Motor:  Right UE muscle strength 5/5. Normal tone.     Left UE muscle strength 5/5. Normal tone.      Right LE muscle strength5/5.  4/5 DF. Normal tone. Wears bilateral AFOs for foot drops    Left LE muscle strength 5/5.4/5 DF . Normal tone.  "     Sensory: Normal light touch, vibration and pinprick sensation bilaterally.    DTRs: 2+ bilaterally in upper and lower extremities.    Babinski: Negative bilaterally.    Co-ordination: Normal finger-to-nose, heel to shin B/L.    Rhomberg: Negative.    Gait: Slightly broad-based    Cardiovascular: Regular rate and rhythm without murmur, gallop or rub.    Assessment and Plan:  1. Migraine with aura and without status migrainosus, not intractable  Patient has a combination of migraine and cervico-occipital headaches   She should continue Topamax to 125 mg twice a day  For abortive treatment of her headaches she can continue taking Maxalt 10 mg and Zofran  She should also keep her self well-hydrated while on Topamax    2. Bilateral occipital neuralgia  In the past we gave her 2 occipital nerve blocks which only helped some  I have asked her to continue amitriptyline 25 mg nightly  She will be getting rhizotomy next month    3.  Kimberly-Danlos syndrome  I have encouraged her to see a specialist at [facility] or [facility] for her Kimberly-Danlos syndrome as she has not been able to find anyone here in [place]       Return in about 2 months (around 5/10/2022).         Thea Montiel MD

## 2022-05-10 ENCOUNTER — OFFICE VISIT (OUTPATIENT)
Dept: NEUROLOGY | Facility: CLINIC | Age: 57
End: 2022-05-10

## 2022-05-10 VITALS
HEIGHT: 65 IN | WEIGHT: 132 LBS | SYSTOLIC BLOOD PRESSURE: 124 MMHG | HEART RATE: 95 BPM | OXYGEN SATURATION: 98 % | BODY MASS INDEX: 21.99 KG/M2 | DIASTOLIC BLOOD PRESSURE: 80 MMHG

## 2022-05-10 DIAGNOSIS — M54.81 BILATERAL OCCIPITAL NEURALGIA: ICD-10-CM

## 2022-05-10 DIAGNOSIS — G43.109 MIGRAINE WITH AURA AND WITHOUT STATUS MIGRAINOSUS, NOT INTRACTABLE: Primary | ICD-10-CM

## 2022-05-10 PROCEDURE — 64450 NJX AA&/STRD OTHER PN/BRANCH: CPT | Performed by: PSYCHIATRY & NEUROLOGY

## 2022-05-10 PROCEDURE — 99212 OFFICE O/P EST SF 10 MIN: CPT | Performed by: PSYCHIATRY & NEUROLOGY

## 2022-05-10 PROCEDURE — 64405 NJX AA&/STRD GR OCPL NRV: CPT | Performed by: PSYCHIATRY & NEUROLOGY

## 2022-05-10 RX ORDER — TOPIRAMATE 25 MG/1
TABLET ORAL
Qty: 60 TABLET | Refills: 5 | Status: SHIPPED | OUTPATIENT
Start: 2022-05-10 | End: 2022-09-21 | Stop reason: SDUPTHER

## 2022-05-10 NOTE — PROGRESS NOTES
Subjective:    CC: Yumiko Oswald is seen today for Migraine and occipital headaches     Current visit- patient states that she was unable to get rhizotomy as she was told by insurance that she would need a medial branch block first which she got at [facility] and [facility].  It only helped with the pain in her neck for 1 to 2 days.  She has had a near daily occipital headache on both sides.  Also has about 1-2 migraine headaches a week.  She forgot to increase her Topamax and continues to take a dose of 100 mg twice a day.  Also continues to take amitriptyline 25 mg nightly but her psychiatrist wants her to switch to Savella instead.  Amitriptyline does not really help her sleep at night.   Procedure note for occipital nerve block-The patient's chart was reviewed.  After obtaining verbal consent the patient was placed in a sitting position with her neck flexed and her chin touching her chest.  Both the left and right occipital areas were prepped with antiseptic solution and injected with bupivacaine 0.5% using a 27-gauge needle.  3 cc of bupivacaine was injected at the site of the greater occipital nerve on each side and  2 cc was injected in the lesser occipital nerve on each side.  Patient tolerated the procedure well    Last visit-patient states that her headaches have improved minimally since starting amitriptyline however her sleep is better.  She is currently taking a dose of 25 mg nightly as the 10 mg dose did not help at all.  She is still waiting for her rhizotomy which is scheduled for next month.  She is having constant neck pain/shoulder pain secondary to her Kimberly-Danlos syndrome radiating into the back of her head.  The occipital nerve block only helped some.  She has about 1 migraine headache a week.  Continues to take Topamax 125 mg twice a day.     Last visit-patient states that she is having frequent occipital headaches as the last nerve block did not help much.  The pain is radiating into her ears.  Her  rhizotomy is not scheduled until April.  Her migraine headaches have improved since increasing Topamax to 125 mg twice a day.  She is still having about 1 migraine a week for which she takes Maxalt plus Zofran.  She is also having difficulty sleeping at night due to her headaches, neck pain and diffuse arthralgias.  Of note-patient was complaining of a severe headache today.  I gave her Ubrelvy 100 mg which helped with the severity of the headache.  Once it was less severe than about 15 minutes I gave her the third occipital nerve block.     Last visit-patient states that after the nerve block her occipital headaches remained well controlled for 4 weeks but she started having them again in the last 2 weeks with tenderness around her ears.  In fact she has difficulty laying on her side due to the pain.  For her neck pain she is going to have another rhizotomy next year.  She is still getting about 1 migraine headache each week for which she takes Zofran and Maxalt.  But last month she had a 3-day long headache.  Continues to take Topamax 100 mg twice a day.  Second occipital nerve block given today.    Last visit-since the last visit patient initially increased her Topamax to 75 mg twice daily but noticed no improvement in her headaches therefore a few weeks ago she increased it to 100 mg twice a day. She states that her migraine headaches have improved with Topamax but she continues to have the headaches in the back of her head and neck that radiate up on both sides around her ears and occasionally to her jaw. Therefore I gave her her first occipital nerve block today    Initial [id number]-old female with a history of Kimberly-Danlos syndrome, hypertension, fibromyalgia, arthritis status post bilateral shoulder and left hip surgery, anxiety, depression, back pain status post L5/S1 surgery presents with headaches.  As per patient she has had headaches for over 25 years.  She would previously get blurred/tunnel  vision 30 minutes before the headache started but now she sees red and black images.  The headaches are usually on one side, throbbing in nature with nausea, photophobia, phonophobia and ringing in her years.  She takes Maxalt and Zofran for them which helps.  Was also started on Topamax 50 mg twice a day about 5 years ago.  She currently has a severe headache about once a week lasting for a day a few dull headaches each week in the back of her head and neck which she attributes to arthritis neck.  Has had several bouts of PT which have helped some.  Is also on a muscle relaxer in addition to oxycodone for arthritis now.  Avoids taking NSAIDs as she has had a GI bleed before.  Reports to having sleep disturbances as well as she gets up every 2 hours.  She had a CT head in 2017 that was unremarkable.  Blood work done recently showed a normal B12, normal vitamin D level and TSH.  ESR and CRP were also normal.  LDL was elevated at 138 and she is trying to make dietary modifications      The following portions of the patient's history were reviewed today and updated as of 08/31/2021  : allergies, current medications, past family history, past medical history, past social history, past surgical history and problem list  These document will be scanned to patient's chart.      Current Outpatient Medications:   •  ALPRAZolam (XANAX) 0.5 MG tablet, Xanax 0.5 mg tablet  At Onset--mariano, Disp: , Rfl:   •  amitriptyline (ELAVIL) 25 MG tablet, Take 1 tablet by mouth Every Night., Disp: 30 tablet, Rfl: 5  •  Biotin 5 MG capsule, Take 10,000 mg by mouth Daily. Takes 2 tablets daily, Disp: , Rfl:   •  buPROPion XL (WELLBUTRIN XL) 300 MG 24 hr tablet, Take 300 mg by mouth Daily., Disp: , Rfl:   •  Calcium Carb-Cholecalciferol (CALCIUM 600 + D PO), Take 1 tablet by mouth Daily., Disp: , Rfl:   •  cyanocobalamin 1000 MCG/ML injection, 1,000 mcg Every 28 (Twenty-Eight) Days., Disp: , Rfl:   •  diphenhydrAMINE (BENADRYL) 25 mg capsule,  Take 25 mg by mouth At Night As Needed for Itching., Disp: , Rfl:   •  DULoxetine (CYMBALTA) 60 MG capsule, Take 60 mg by mouth Daily., Disp: , Rfl:   •  esomeprazole (nexIUM) 40 MG capsule, Take 40 mg by mouth Every Morning Before Breakfast., Disp: , Rfl:   •  Magnesium 500 MG capsule, Take 1 tablet by mouth Daily., Disp: , Rfl:   •  meloxicam (MOBIC) 15 MG tablet, meloxicam 15 mg tablet  Take 1 tablet every day by oral route., Disp: , Rfl:   •  Multiple Vitamins-Minerals (MULTIVITAMIN WITH MINERALS) tablet tablet, Take 1 tablet by mouth Daily., Disp: , Rfl:   •  ondansetron ODT (ZOFRAN-ODT) 8 MG disintegrating tablet, Take 1 tablet by mouth Every 8 (Eight) Hours As Needed for nausea or vomiting., Disp: 16 tablet, Rfl: 0  •  oxyCODONE-acetaminophen (PERCOCET) 7.5-325 MG per tablet, Take 1 tablet by mouth Every 6 (Six) Hours As Needed., Disp: , Rfl:   •  Potassium Gluconate 550 MG tablet, potassium gluconate 550 mg (90 mg) tablet  Take by oral route., Disp: , Rfl:   •  rizatriptan (MAXALT) 10 MG tablet, Take 10 mg by mouth 1 (One) Time As Needed for Migraine. May repeat in 2 hours if needed, Disp: , Rfl:   •  tiZANidine (ZANAFLEX) 2 MG tablet, Every 6 (Six) Hours., Disp: , Rfl:   •  topiramate (Topamax) 100 MG tablet, Take 1 tablet by mouth 2 (Two) Times a Day., Disp: 60 tablet, Rfl: 5  •  topiramate (Topamax) 25 MG tablet, Take 1 tablet twice a day, Disp: 60 tablet, Rfl: 5  •  Turmeric Curcumin 500 MG capsule, Take 1 tablet by mouth Daily., Disp: , Rfl:   •  Valerian 450 MG capsule, Take 1 tablet by mouth Daily., Disp: , Rfl:   •  Wheat Dextrin (BENEFIBER DRINK MIX PO), Benefiber, Disp: , Rfl:   •  montelukast (SINGULAIR) 10 MG tablet, , Disp: , Rfl:    Past Medical History:   Diagnosis Date   • Anxiety    • Arthritis    • Depression    • Kimberly-Danlos disease    • GERD (gastroesophageal reflux disease)    • GI bleed    • History of transfusion     2 UNITS   • Migraine    • Restless legs    • Wears eyeglasses      "  Past Surgical History:   Procedure Laterality Date   • BACK SURGERY     • CHOLECYSTECTOMY N/A 6/7/2018    Procedure: CHOLECYSTECTOMY LAPAROSCOPIC;  Surgeon: Romana Torre MD;  Location: [facility] OR;  Service: General   • COLONOSCOPY     • DILATATION AND CURETTAGE     • ENDOSCOPY     • GASTRIC BYPASS  2002   • HYSTERECTOMY     • ROTATOR CUFF REPAIR      bilateral   • TONSILLECTOMY     • TOTAL HIP ARTHROPLASTY Left 10/4/2016    Procedure: TOTAL HIP ARTHROPLASTY LEFT;  Surgeon: Arvind Oh MD;  Location: [facility] OR;  Service:    • TOTAL HIP ARTHROPLASTY Left       Family History   Problem Relation Age of Onset   • Breast cancer Maternal Grandmother 50   • Dementia Mother    • Lung cancer Mother    • Heart failure Father    • Ovarian cancer Neg Hx       Social History     Socioeconomic History   • Marital status:    Tobacco Use   • Smoking status: Never Smoker   • Smokeless tobacco: Never Used   Vaping Use   • Vaping Use: Never used   Substance and Sexual Activity   • Alcohol use: No   • Drug use: No   • Sexual activity: Defer     Birth control/protection: Post-menopausal     Review of Systems   Musculoskeletal: Positive for arthralgias and back pain.   Neurological: Positive for headache.   All other systems reviewed and are negative.      Objective:    /80   Pulse 95   Ht 165.1 cm (65\")   Wt 59.9 kg (132 lb)   SpO2 98%   BMI 21.97 kg/m²     Neurology Exam:    General apperance: NAD.  Tenderness to palpation of the occipital regions bilaterally    Mental status: Alert, awake and oriented to time place and person.    Recent and Remote memory: Intact.    Attention span and Concentration: Normal.     Language and Speech: Intact- No dysarthria.    Fluency, Naming , Repitition and Comprehension:  Intact    Cranial Nerves:   CN II: Visual fields are full. Intact. Fundi - Normal, No papillederma, Pupils - PUSHPA  CN III, IV and VI: Extraocular movements are intact. Normal saccades.   CN V: Facial " sensation is intact.   CN VII: Muscles of facial expression reveal no asymmetry. Intact.   CN VIII: Hearing is intact. Whispered voice intact.   CN IX and X: Palate elevates symmetrically. Intact  CN XI: Shoulder shrug is intact.   CN XII: Tongue is midline without evidence of atrophy or fasciculation.     Ophthalmoscopic exam of optic disc-normal    Motor:  Right UE muscle strength 5/5. Normal tone.     Left UE muscle strength 5/5. Normal tone.      Right LE muscle strength5/5.  4/5 DF. Normal tone. Wears bilateral AFOs for foot drops    Left LE muscle strength 5/5.4/5 DF . Normal tone.      Sensory: Normal light touch, vibration and pinprick sensation bilaterally.    DTRs: 2+ bilaterally in upper and lower extremities.    Babinski: Negative bilaterally.    Co-ordination: Normal finger-to-nose, heel to shin B/L.    Rhomberg: Negative.    Gait: Slightly broad-based    Cardiovascular: Regular rate and rhythm without murmur, gallop or rub.    Assessment and Plan:  1. Migraine with aura and without status migrainosus, not intractable  Patient has a combination of migraine and cervico-occipital headaches   I have once again told her to increase her Topamax to 125 mg twice a day  For abortive treatment of her headaches she can continue taking Maxalt 10 mg and Zofran  She should also keep her self well-hydrated while on Topamax    2. Bilateral occipital neuralgia  I gave her a bilateral occipital nerve block today which she tolerated well.    3.  Kimberly-Danlos syndrome  She will be seeing a specialist at either [facility] or [facility] soon       Return in about 3 months (around 8/10/2022).         Thea Montiel MD

## 2022-05-26 ENCOUNTER — OFFICE VISIT (OUTPATIENT)
Dept: GASTROENTEROLOGY | Facility: CLINIC | Age: 57
End: 2022-05-26

## 2022-05-26 VITALS
DIASTOLIC BLOOD PRESSURE: 78 MMHG | BODY MASS INDEX: 22.89 KG/M2 | HEART RATE: 100 BPM | WEIGHT: 137.4 LBS | HEIGHT: 65 IN | OXYGEN SATURATION: 94 % | SYSTOLIC BLOOD PRESSURE: 140 MMHG | TEMPERATURE: 96.9 F

## 2022-05-26 DIAGNOSIS — K21.9 GASTROESOPHAGEAL REFLUX DISEASE, UNSPECIFIED WHETHER ESOPHAGITIS PRESENT: Primary | ICD-10-CM

## 2022-05-26 DIAGNOSIS — R14.0 BLOATING: ICD-10-CM

## 2022-05-26 DIAGNOSIS — K58.2 IRRITABLE BOWEL SYNDROME WITH BOTH CONSTIPATION AND DIARRHEA: ICD-10-CM

## 2022-05-26 DIAGNOSIS — T78.1XXA GASTROINTESTINAL FOOD SENSITIVITY: ICD-10-CM

## 2022-05-26 DIAGNOSIS — R13.19 ESOPHAGEAL DYSPHAGIA: ICD-10-CM

## 2022-05-26 PROBLEM — I73.00 RAYNAUD'S DISEASE: Status: ACTIVE | Noted: 2022-05-26

## 2022-05-26 PROBLEM — G54.0 THORACIC OUTLET SYNDROME: Status: ACTIVE | Noted: 2022-05-26

## 2022-05-26 PROBLEM — G93.32 CHRONIC FATIGUE SYNDROME: Status: ACTIVE | Noted: 2022-05-26

## 2022-05-26 PROBLEM — M51.34 DDD (DEGENERATIVE DISC DISEASE), THORACIC: Status: ACTIVE | Noted: 2022-05-26

## 2022-05-26 PROBLEM — M48.02 SPINAL STENOSIS IN CERVICAL REGION: Status: ACTIVE | Noted: 2022-05-26

## 2022-05-26 PROCEDURE — 99205 OFFICE O/P NEW HI 60 MIN: CPT | Performed by: PHYSICIAN ASSISTANT

## 2022-05-26 RX ORDER — MILNACIPRAN HYDROCHLORIDE 50 MG/1
TABLET, FILM COATED ORAL
COMMUNITY
Start: 2022-05-25

## 2022-05-26 RX ORDER — ALPRAZOLAM 0.5 MG/1
TABLET, EXTENDED RELEASE ORAL
COMMUNITY
Start: 2022-05-25

## 2022-05-26 NOTE — PROGRESS NOTES
New Patient Consultation     Patient Name: Yumiko Oswald  : 1965   MRN: [id number]     Chief Complaint:  No chief complaint on file.      History of Present Illness: Yumiko Oswald is a 56 y.o. female, PMH includes anxiety, depression, GERD, migraines, RLS, Kimberly Danlos who is here today for a Gastroenterology Consultation for GERD, IBS.    Pt has chronic h/o GERD, on nexium 40mg daily. She continues to experience daily esophageal reflux, occasional esophageal spasms /dysphagia and near constant nausea. She has about one episode of emesis per week, usually in morning. She notes elimination of wheat, soy, dairy, red meat, eggs / butter over the last year for sx of epigastric bloating, increased flatus. Avoiding these foods generally alleviates her sx. She notes 70lb weight loss in the last year or so since avoiding these foods.     She also notes long h/o irritable bowel syndrome with mixed constipation and diarrhea. She generally has one large, formed, complete BM daily with addition of benefiber, miralax 1 capful and digestive tea daily. She notes occasional episodes of diarrhea and fecal urgency. She denies blood or mucous in her stool.     Patient denies associated fever, chills, hematemesis, hematochezia, melena, dysuria, jaundice or bruising.    Patient denies personal or FHx of pancreatitis, colitis, Celiac disease, UC, Crohn's disease, colon or gastric cancers. Pt denies EtOH, tobacco, illicit substance or NSAID use.    EGD 2016 with Dr. Martinez in 2016 for acute GI bleed from peptic ulcer.     CSY 2017 with Dr. Martinez, diverticular disease.     Subjective      Review of Systems:   Review of Systems   Constitutional: Negative for appetite change, chills, diaphoresis, fatigue, fever, unexpected weight gain and unexpected weight loss.   HENT: Positive for trouble swallowing. Negative for drooling, facial swelling, mouth sores, rhinorrhea, sore throat, tinnitus and voice change.    Eyes:  Negative.    Respiratory: Negative for cough, chest tightness and shortness of breath.    Cardiovascular: Negative for chest pain.   Gastrointestinal: Positive for abdominal distention, abdominal pain, constipation, diarrhea, nausea, vomiting and GERD. Negative for blood in stool and indigestion.   Genitourinary: Negative for dysuria, flank pain, hematuria and pelvic pain.   Musculoskeletal: Negative for arthralgias and myalgias.   Skin: Negative for color change, pallor and rash.   Neurological: Negative for dizziness, tremors, syncope, weakness and numbness.   Psychiatric/Behavioral: Negative for hallucinations and sleep disturbance. The patient is not nervous/anxious.    All other systems reviewed and are negative.      Past Medical History:   Past Medical History:   Diagnosis Date   • Anxiety    • Arthritis    • Depression    • Kimberly-Danlos disease    • GERD (gastroesophageal reflux disease)    • GI bleed    • History of transfusion     2 UNITS   • Migraine    • Restless legs    • Wears eyeglasses        Past Surgical History:   Past Surgical History:   Procedure Laterality Date   • BACK SURGERY     • CHOLECYSTECTOMY N/A 6/7/2018    Procedure: CHOLECYSTECTOMY LAPAROSCOPIC;  Surgeon: Romana Torre MD;  Location:  [facility] OR;  Service: General   • COLONOSCOPY     • DILATATION AND CURETTAGE     • ENDOSCOPY     • GASTRIC BYPASS  2002   • HYSTERECTOMY     • ROTATOR CUFF REPAIR      bilateral   • TONSILLECTOMY     • TOTAL HIP ARTHROPLASTY Left 10/4/2016    Procedure: TOTAL HIP ARTHROPLASTY LEFT;  Surgeon: Arvind Oh MD;  Location:  [facility] OR;  Service:    • TOTAL HIP ARTHROPLASTY Left        Family History:   Family History   Problem Relation Age of Onset   • Breast cancer Maternal Grandmother 50   • Dementia Mother    • Lung cancer Mother    • Heart failure Father    • Ovarian cancer Neg Hx        Social History:   Social History     Socioeconomic History   • Marital status:    Tobacco Use   •  Smoking status: Never Smoker   • Smokeless tobacco: Never Used   Vaping Use   • Vaping Use: Never used   Substance and Sexual Activity   • Alcohol use: No   • Drug use: No   • Sexual activity: Defer     Birth control/protection: Post-menopausal       Alcohol/Tobacco History:   Social History     Substance and Sexual Activity   Alcohol Use No     Social History     Tobacco Use   Smoking Status Never Smoker   Smokeless Tobacco Never Used       Medications:     Current Outpatient Medications:   •  ALPRAZolam (XANAX) 0.5 MG tablet, Xanax 0.5 mg tablet  At Onset--pysch, Disp: , Rfl:   •  ALPRAZolam XR (XANAX XR) 0.5 MG 24 hr tablet, , Disp: , Rfl:   •  amitriptyline (ELAVIL) 25 MG tablet, Take 1 tablet by mouth Every Night., Disp: 30 tablet, Rfl: 5  •  Biotin 5 MG capsule, Take 10,000 mg by mouth Daily. Takes 2 tablets daily, Disp: , Rfl:   •  buPROPion XL (WELLBUTRIN XL) 300 MG 24 hr tablet, Take 300 mg by mouth Daily., Disp: , Rfl:   •  Calcium Carb-Cholecalciferol (CALCIUM 600 + D PO), Take 1 tablet by mouth Daily., Disp: , Rfl:   •  cyanocobalamin 1000 MCG/ML injection, 1,000 mcg Every 28 (Twenty-Eight) Days., Disp: , Rfl:   •  diphenhydrAMINE (BENADRYL) 25 mg capsule, Take 25 mg by mouth At Night As Needed for Itching., Disp: , Rfl:   •  DULoxetine (CYMBALTA) 60 MG capsule, Take 60 mg by mouth Daily., Disp: , Rfl:   •  esomeprazole (nexIUM) 40 MG capsule, Take 40 mg by mouth Every Morning Before Breakfast., Disp: , Rfl:   •  Magnesium 500 MG capsule, Take 1 tablet by mouth Daily., Disp: , Rfl:   •  meloxicam (MOBIC) 15 MG tablet, meloxicam 15 mg tablet  Take 1 tablet every day by oral route., Disp: , Rfl:   •  montelukast (SINGULAIR) 10 MG tablet, , Disp: , Rfl:   •  Multiple Vitamins-Minerals (MULTIVITAMIN WITH MINERALS) tablet tablet, Take 1 tablet by mouth Daily., Disp: , Rfl:   •  ondansetron ODT (ZOFRAN-ODT) 8 MG disintegrating tablet, Take 1 tablet by mouth Every 8 (Eight) Hours As Needed for nausea or  vomiting., Disp: 16 tablet, Rfl: 0  •  oxyCODONE-acetaminophen (PERCOCET) 7.5-325 MG per tablet, Take 1 tablet by mouth Every 6 (Six) Hours As Needed., Disp: , Rfl:   •  Potassium Gluconate 550 MG tablet, potassium gluconate 550 mg (90 mg) tablet  Take by oral route., Disp: , Rfl:   •  rizatriptan (MAXALT) 10 MG tablet, Take 10 mg by mouth 1 (One) Time As Needed for Migraine. May repeat in 2 hours if needed, Disp: , Rfl:   •  Savella 50 MG tablet tablet, , Disp: , Rfl:   •  tiZANidine (ZANAFLEX) 2 MG tablet, Every 6 (Six) Hours., Disp: , Rfl:   •  topiramate (Topamax) 100 MG tablet, Take 1 tablet by mouth 2 (Two) Times a Day., Disp: 60 tablet, Rfl: 5  •  topiramate (Topamax) 25 MG tablet, Take 1 tablet twice a day, Disp: 60 tablet, Rfl: 5  •  Turmeric Curcumin 500 MG capsule, Take 1 tablet by mouth Daily., Disp: , Rfl:   •  Valerian 450 MG capsule, Take 1 tablet by mouth Daily., Disp: , Rfl:   •  Wheat Dextrin (BENEFIBER DRINK MIX PO), Benefiber, Disp: , Rfl:     Allergies:   Allergies   Allergen Reactions   • Ultram [Tramadol] Itching     Severe itching       Objective     Physical Exam:  Vital Signs: There were no vitals filed for this visit.  There is no height or weight on file to calculate BMI.     Physical Exam  Vitals and nursing note reviewed.   Constitutional:       Appearance: Normal appearance. She is normal weight. She is not ill-appearing or diaphoretic.      Comments: Ambulates with cane   HENT:      Head: Normocephalic and atraumatic.      Right Ear: External ear normal.      Left Ear: External ear normal.      Nose: Nose normal. No rhinorrhea.      Mouth/Throat:      Mouth: Mucous membranes are moist.      Pharynx: Oropharynx is clear. No posterior oropharyngeal erythema.   Eyes:      General:         Right eye: No discharge.         Left eye: No discharge.      Conjunctiva/sclera: Conjunctivae normal.      Pupils: Pupils are equal, round, and reactive to light.   Neck:      Thyroid: No thyromegaly.    Cardiovascular:      Rate and Rhythm: Normal rate and regular rhythm.      Pulses: Normal pulses.      Heart sounds: Normal heart sounds. No murmur heard.  Pulmonary:      Effort: Pulmonary effort is normal.      Breath sounds: Normal breath sounds. No wheezing.   Abdominal:      General: Abdomen is flat. Bowel sounds are normal. There is no distension.      Tenderness: There is no abdominal tenderness. There is no guarding or rebound.   Musculoskeletal:         General: No tenderness. Normal range of motion.      Cervical back: Normal range of motion and neck supple.      Right lower leg: No edema.      Left lower leg: No edema.   Skin:     General: Skin is warm and dry.      Capillary Refill: Capillary refill takes less than 2 seconds.      Coloration: Skin is not jaundiced.      Findings: No bruising.   Neurological:      General: No focal deficit present.      Mental Status: She is oriented to person, place, and time.      Cranial Nerves: No cranial nerve deficit.   Psychiatric:         Mood and Affect: Mood normal.         Thought Content: Thought content normal.         Judgment: Judgment normal.         Assessment / Plan      Assessment/Plan:   There are no diagnoses linked to this encounter.     GERD  Esophageal dysphagia  IBS with constipation and diarrhea  Bloating  Multiple food sensitivities   - continue nexium 40mg, miralax 1 capful, benefiber daily   - trial of xifaxan 550mg TID x 14 days sent to pharmacy   - pt given GERD diet instructions, advised to avoid GI irritants such as caffeine, carbonation, EtOH, tobacco, chocolate, peppermint, acid-based foods   - pt encouraged to continue to avoid trigger foods   - consider GES, pending endoscopy findings   - schedule for EGD / CSY   - follow up in clinic after completion of above studies   - call clinic at any time for questions or new / worsened sx    Follow Up:   Return in about 6 weeks (around 7/7/2022).    Plan of care reviewed with the patient at  the conclusion of today's visit.  Education was provided regarding diagnosis, management, and any prescribed or recommended OTC medications.  Patient verbalized understanding of and agreement with management plan.     Time Statement:   Discussed plan of care in detail with patient today. Patient verbally understands and agrees. I have spent 60 minutes reviewing available diagnostics, obtaining history, examining the patient, developing a treatment plan, and educating the patient on disease process and plan of care.    Lizzy Lamas PA-C   [facility]

## 2022-06-09 ENCOUNTER — PRIOR AUTHORIZATION (OUTPATIENT)
Dept: GASTROENTEROLOGY | Facility: CLINIC | Age: 57
End: 2022-06-09

## 2022-06-09 ENCOUNTER — TELEPHONE (OUTPATIENT)
Dept: GASTROENTEROLOGY | Facility: CLINIC | Age: 57
End: 2022-06-09

## 2022-06-09 NOTE — TELEPHONE ENCOUNTER
Informed patient of Xifaxan PA approval. Patient will call pharmacist. If copay is still too expensive. I will see if we have enough samples. Patient voiced understanding.

## 2022-06-09 NOTE — TELEPHONE ENCOUNTER
PA Case: [id number], Status: Approved, Coverage Starts on: 4/1/2022 12:00:00 AM, Coverage Ends on: 6/9/2023 12:00:00 AM.

## 2022-06-13 NOTE — TELEPHONE ENCOUNTER
I called Ms Oswald back. Patient is agreeable to applying for patient assistance for Xifaxan. Will bring in proof of income and sign form to submit to [facility].

## 2022-06-13 NOTE — TELEPHONE ENCOUNTER
Can our xifaxan reps offer any help or financial assistance? She has tried and failed multiple agents; xifaxan is the next step. Thanks!

## 2022-06-13 NOTE — TELEPHONE ENCOUNTER
Ms Margret Ball called back.Xifaxa is still $600 with PA approval. We don't have any Xifaxan samples at this time. Can you recommend an alternative please? Thanks

## 2022-06-15 DIAGNOSIS — Z12.11 COLON CANCER SCREENING: Primary | ICD-10-CM

## 2022-06-15 RX ORDER — SODIUM, POTASSIUM,MAG SULFATES 17.5-3.13G
1 SOLUTION, RECONSTITUTED, ORAL ORAL TAKE AS DIRECTED
Qty: 354 ML | Refills: 0 | Status: SHIPPED | OUTPATIENT
Start: 2022-06-15

## 2022-06-26 ENCOUNTER — CLINICAL SUPPORT NO REQUIREMENTS (OUTPATIENT)
Dept: PREADMISSION TESTING | Facility: HOSPITAL | Age: 57
End: 2022-06-26

## 2022-06-26 LAB — SARS-COV-2 RNA PNL SPEC NAA+PROBE: NOT DETECTED

## 2022-06-26 PROCEDURE — U0004 COV-19 TEST NON-CDC HGH THRU: HCPCS

## 2022-06-26 PROCEDURE — C9803 HOPD COVID-19 SPEC COLLECT: HCPCS

## 2022-06-29 ENCOUNTER — OUTSIDE FACILITY SERVICE (OUTPATIENT)
Dept: GASTROENTEROLOGY | Facility: CLINIC | Age: 57
End: 2022-06-29

## 2022-06-29 PROCEDURE — 88305 TISSUE EXAM BY PATHOLOGIST: CPT | Performed by: INTERNAL MEDICINE

## 2022-06-29 PROCEDURE — 45380 COLONOSCOPY AND BIOPSY: CPT | Performed by: INTERNAL MEDICINE

## 2022-06-29 PROCEDURE — 43239 EGD BIOPSY SINGLE/MULTIPLE: CPT | Performed by: INTERNAL MEDICINE

## 2022-06-30 ENCOUNTER — LAB REQUISITION (OUTPATIENT)
Dept: LAB | Facility: HOSPITAL | Age: 57
End: 2022-06-30

## 2022-06-30 DIAGNOSIS — R63.4 ABNORMAL WEIGHT LOSS: ICD-10-CM

## 2022-06-30 DIAGNOSIS — K64.8 OTHER HEMORRHOIDS: ICD-10-CM

## 2022-06-30 DIAGNOSIS — R10.13 EPIGASTRIC PAIN: ICD-10-CM

## 2022-06-30 DIAGNOSIS — K21.00 GASTRO-ESOPHAGEAL REFLUX DISEASE WITH ESOPHAGITIS, WITHOUT BLEEDING: ICD-10-CM

## 2022-06-30 DIAGNOSIS — K59.00 CONSTIPATION, UNSPECIFIED: ICD-10-CM

## 2022-06-30 DIAGNOSIS — K44.9 DIAPHRAGMATIC HERNIA WITHOUT OBSTRUCTION OR GANGRENE: ICD-10-CM

## 2022-06-30 DIAGNOSIS — K21.9 GASTRO-ESOPHAGEAL REFLUX DISEASE WITHOUT ESOPHAGITIS: ICD-10-CM

## 2022-06-30 DIAGNOSIS — K57.30 DIVERTICULOSIS OF LARGE INTESTINE WITHOUT PERFORATION OR ABSCESS WITHOUT BLEEDING: ICD-10-CM

## 2022-07-01 LAB
CYTO UR: NORMAL
LAB AP CASE REPORT: NORMAL
LAB AP CLINICAL INFORMATION: NORMAL
PATH REPORT.FINAL DX SPEC: NORMAL
PATH REPORT.GROSS SPEC: NORMAL

## 2022-07-13 ENCOUNTER — OFFICE VISIT (OUTPATIENT)
Dept: GASTROENTEROLOGY | Facility: CLINIC | Age: 57
End: 2022-07-13

## 2022-07-13 VITALS
DIASTOLIC BLOOD PRESSURE: 78 MMHG | HEART RATE: 88 BPM | SYSTOLIC BLOOD PRESSURE: 128 MMHG | HEIGHT: 65 IN | WEIGHT: 131.6 LBS | OXYGEN SATURATION: 98 % | TEMPERATURE: 97.3 F | BODY MASS INDEX: 21.92 KG/M2

## 2022-07-13 DIAGNOSIS — K58.9 IRRITABLE BOWEL SYNDROME, UNSPECIFIED TYPE: ICD-10-CM

## 2022-07-13 DIAGNOSIS — K21.00 GASTROESOPHAGEAL REFLUX DISEASE WITH ESOPHAGITIS WITHOUT HEMORRHAGE: Primary | ICD-10-CM

## 2022-07-13 PROCEDURE — 99214 OFFICE O/P EST MOD 30 MIN: CPT | Performed by: PHYSICIAN ASSISTANT

## 2022-07-13 NOTE — PROGRESS NOTES
Follow Up      Patient Name: Yumiko Oswald  : 1965   MRN: [id number]     Chief Complaint:    Chief Complaint   Patient presents with   • Follow-up     Follow up on EGD/Colon       History of Present Illness: Yumiko Oswald is a 56 y.o. female who is here today for follow up on GERD, IBS.     EGD / CSY  with Dr. Martinez. Normal upper / middle third of esophagus. Small hiadal hernia. Z-line 39cm from incisors. LA Grade A esophagitis. Evidence of gastric bypass, gastric pouch about 3cm. Normal jejunum, anastomosis patent with staple present. Bx negative for celiac disease, H Pylori, EoE. Good bowel prep conditions. Normal terminal ileum. Nonbleeding internal hemorrhoids. Diverticulosis without acute diverticulitis in sigmoid and descending colon. Normal appearing colonic mucosa otherwise. Bx negative for microscopic colitis.    Pt completed course of xifaxan and notes large improvement of bloating, pain and stool frequency. She continues to take nexium 40mg daily and overall notes good control of indigesion sx. She notes occasional esophageal dysphagia, reflux after certain food choices and questions if she could have an underlying food sensitivity / allergy; she is requesting allergy referral today.     Patient denies associated fever, chills, abdominal pain, indigestion, nausea, vomiting, diarrhea, constipation, hematemesis, hematochezia, melena, bloating, weight loss or gain, dysuria, jaundice or bruising.    Subjective      Review of Systems:   Review of Systems   Constitutional: Negative for appetite change, chills, diaphoresis, fatigue, fever, unexpected weight gain and unexpected weight loss.   HENT: Positive for trouble swallowing. Negative for drooling, facial swelling, mouth sores, rhinorrhea, sore throat, tinnitus and voice change.    Eyes: Negative.    Respiratory: Negative for cough, chest tightness and shortness of breath.    Cardiovascular: Negative for chest pain.    Gastrointestinal: Negative for abdominal pain, blood in stool, constipation, diarrhea, nausea, vomiting, GERD and indigestion.   Genitourinary: Negative for dysuria, flank pain, hematuria and pelvic pain.   Musculoskeletal: Negative for arthralgias and myalgias.   Skin: Negative for color change, pallor and rash.   Neurological: Negative for dizziness, tremors, syncope, weakness and numbness.   Psychiatric/Behavioral: Negative for hallucinations and sleep disturbance. The patient is not nervous/anxious.    All other systems reviewed and are negative.      Medications:     Current Outpatient Medications:   •  ALPRAZolam (XANAX) 0.5 MG tablet, Xanax 0.5 mg tablet  At Onset--pysch, Disp: , Rfl:   •  ALPRAZolam XR (XANAX XR) 0.5 MG 24 hr tablet, , Disp: , Rfl:   •  amitriptyline (ELAVIL) 25 MG tablet, Take 1 tablet by mouth Every Night., Disp: 30 tablet, Rfl: 5  •  Biotin 5 MG capsule, Take 10,000 mg by mouth Daily. Takes 2 tablets daily, Disp: , Rfl:   •  buPROPion XL (WELLBUTRIN XL) 300 MG 24 hr tablet, Take 300 mg by mouth Daily., Disp: , Rfl:   •  Calcium Carb-Cholecalciferol (CALCIUM 600 + D PO), Take 1 tablet by mouth Daily., Disp: , Rfl:   •  cyanocobalamin 1000 MCG/ML injection, 1,000 mcg Every 28 (Twenty-Eight) Days., Disp: , Rfl:   •  diphenhydrAMINE (BENADRYL) 25 mg capsule, Take 25 mg by mouth At Night As Needed for Itching., Disp: , Rfl:   •  DULoxetine (CYMBALTA) 60 MG capsule, Take 60 mg by mouth Daily., Disp: , Rfl:   •  esomeprazole (nexIUM) 40 MG capsule, Take 40 mg by mouth Every Morning Before Breakfast., Disp: , Rfl:   •  Magnesium 500 MG capsule, Take 1 tablet by mouth Daily., Disp: , Rfl:   •  meloxicam (MOBIC) 15 MG tablet, meloxicam 15 mg tablet  Take 1 tablet every day by oral route., Disp: , Rfl:   •  montelukast (SINGULAIR) 10 MG tablet, , Disp: , Rfl:   •  Multiple Vitamins-Minerals (MULTIVITAMIN WITH MINERALS) tablet tablet, Take 1 tablet by mouth Daily., Disp: , Rfl:   •  ondansetron ODT  (ZOFRAN-ODT) 8 MG disintegrating tablet, Take 1 tablet by mouth Every 8 (Eight) Hours As Needed for nausea or vomiting., Disp: 16 tablet, Rfl: 0  •  oxyCODONE-acetaminophen (PERCOCET) 7.5-325 MG per tablet, Take 1 tablet by mouth Every 6 (Six) Hours As Needed., Disp: , Rfl:   •  Polyethylene Glycol 3350 (MIRALAX PO), Take  by mouth., Disp: , Rfl:   •  Potassium Gluconate 550 MG tablet, potassium gluconate 550 mg (90 mg) tablet  Take by oral route., Disp: , Rfl:   •  rizatriptan (MAXALT) 10 MG tablet, Take 10 mg by mouth 1 (One) Time As Needed for Migraine. May repeat in 2 hours if needed, Disp: , Rfl:   •  Savella 50 MG tablet tablet, , Disp: , Rfl:   •  sodium-potassium-magnesium sulfates (Suprep Bowel Prep Kit) 17.5-3.13-1.6 GM/177ML solution oral solution, Take 1 bottle by mouth Take As Directed. Follow instructions that were mailed to your home. If you didn't receive these call [phone]., Disp: 354 mL, Rfl: 0  •  tiZANidine (ZANAFLEX) 2 MG tablet, Every 6 (Six) Hours., Disp: , Rfl:   •  topiramate (Topamax) 100 MG tablet, Take 1 tablet by mouth 2 (Two) Times a Day. (Patient taking differently: Take 125 mg by mouth 2 (Two) Times a Day.), Disp: 60 tablet, Rfl: 5  •  topiramate (Topamax) 25 MG tablet, Take 1 tablet twice a day, Disp: 60 tablet, Rfl: 5  •  Turmeric Curcumin 500 MG capsule, Take 1 tablet by mouth Daily., Disp: , Rfl:   •  Valerian 450 MG capsule, Take 1 tablet by mouth Daily., Disp: , Rfl:   •  Wheat Dextrin (BENEFIBER DRINK MIX PO), Benefiber, Disp: , Rfl:     Allergies:   Allergies   Allergen Reactions   • Soy Allergy Other (See Comments)   • Ultram [Tramadol] Itching     Severe itching       Social History:   Social History     Socioeconomic History   • Marital status:    Tobacco Use   • Smoking status: Never Smoker   • Smokeless tobacco: Never Used   Vaping Use   • Vaping Use: Never used   Substance and Sexual Activity   • Alcohol use: No   • Drug use: No   • Sexual activity: Defer  "    Birth control/protection: Post-menopausal        Surgical History:   Past Surgical History:   Procedure Laterality Date   • BACK SURGERY     • BARIATRIC SURGERY     • CHOLECYSTECTOMY N/A 06/07/2018    Procedure: CHOLECYSTECTOMY LAPAROSCOPIC;  Surgeon: Romana Torre MD;  Location: [facility];  Service: General   • COLONOSCOPY     • DILATATION AND CURETTAGE     • ENDOSCOPY     • GASTRIC BYPASS  2002   • HYSTERECTOMY     • ROTATOR CUFF REPAIR      bilateral   • TONSILLECTOMY     • TOTAL HIP ARTHROPLASTY Left 10/04/2016    Procedure: TOTAL HIP ARTHROPLASTY LEFT;  Surgeon: Arvind Oh MD;  Location: [facility] OR;  Service:    • TOTAL HIP ARTHROPLASTY Left    • UPPER GASTROINTESTINAL ENDOSCOPY          Medical History:   Past Medical History:   Diagnosis Date   • Anxiety    • Arthritis    • Depression    • Kimberly-Danlos disease    • GERD (gastroesophageal reflux disease)    • GI bleed    • History of transfusion     2 UNITS   • Migraine    • Restless legs    • Wears eyeglasses         Objective     Physical Exam:  Vital Signs:   Vitals:    07/13/22 1340   BP: 128/78   BP Location: Left arm   Patient Position: Sitting   Cuff Size: Adult   Pulse: 88   Temp: 97.3 °F (36.3 °C)   TempSrc: Temporal   SpO2: 98%   Weight: 59.7 kg (131 lb 9.6 oz)   Height: 165.1 cm (65\")     Body mass index is 21.9 kg/m².     Physical Exam  Vitals and nursing note reviewed.   Constitutional:       Appearance: Normal appearance. She is normal weight. She is not ill-appearing or diaphoretic.   HENT:      Head: Normocephalic and atraumatic.      Right Ear: External ear normal.      Left Ear: External ear normal.      Nose: Nose normal. No rhinorrhea.      Mouth/Throat:      Mouth: Mucous membranes are moist.      Pharynx: Oropharynx is clear. No posterior oropharyngeal erythema.   Eyes:      General:         Right eye: No discharge.         Left eye: No discharge.      Conjunctiva/sclera: Conjunctivae normal.      Pupils: Pupils are " equal, round, and reactive to light.   Neck:      Thyroid: No thyromegaly.   Cardiovascular:      Rate and Rhythm: Normal rate and regular rhythm.      Pulses: Normal pulses.      Heart sounds: Normal heart sounds. No murmur heard.  Pulmonary:      Effort: Pulmonary effort is normal.      Breath sounds: Normal breath sounds. No wheezing.   Abdominal:      General: Abdomen is flat. Bowel sounds are normal. There is no distension.      Tenderness: There is no abdominal tenderness.   Musculoskeletal:         General: No tenderness. Normal range of motion.      Cervical back: Normal range of motion and neck supple.      Right lower leg: No edema.      Left lower leg: No edema.   Skin:     General: Skin is warm and dry.      Capillary Refill: Capillary refill takes less than 2 seconds.      Coloration: Skin is not jaundiced.      Findings: No bruising.   Neurological:      General: No focal deficit present.      Mental Status: She is oriented to person, place, and time.      Cranial Nerves: No cranial nerve deficit.   Psychiatric:         Mood and Affect: Mood normal.         Thought Content: Thought content normal.         Judgment: Judgment normal.         Assessment / Plan      Assessment/Plan:   There are no diagnoses linked to this encounter.     GERD  IBS-D   - continue nexium 40mg daily   - pt given GERD diet instructions, advised to avoid GI irritants such as caffeine, carbonation, EtOH, tobacco, chocolate, peppermint, acid-based foods   - previous endoscopy and pathology reports reviewed with patient   - referral to allergy specialist placed   - follow up in clinic in 3mo, or after completion of above studies   - call clinic at any time for questions or new / worsened sx    Follow Up:   Return in about 3 months (around 10/13/2022).    Plan of care reviewed with the patient at the conclusion of today's visit.  Education was provided regarding diagnosis, management, and any prescribed or recommended OTC  medications.  Patient verbalized understanding of and agreement with management plan.     Time Statement:   Discussed plan of care in detail with patient today. Patient verbally understands and agrees. I have spent 30 minutes reviewing available diagnostics, obtaining history, examining the patient, developing a treatment plan, and educating the patient on disease process and plan of care.     Lizzy Lamas PA-C   [facility]

## 2022-07-15 ENCOUNTER — TELEPHONE (OUTPATIENT)
Dept: NEUROLOGY | Facility: CLINIC | Age: 57
End: 2022-07-15

## 2022-07-15 NOTE — TELEPHONE ENCOUNTER
Caller: Yumiko Oswald    Relationship: Self    Best call back number: [phone]    What was the call regarding: PT IS REQUESTING AN APPT FOR ONB INJECTIONS DUE TO WORSENING OCCIPITAL HEADACHES & MIGRAINES. PT HAS AN UPCOMING APPT ON 8/11/22- PT STATES SHE WOULD LIKE TO COME IN BEFORE THE 11TH IF POSSIBLE.    Do you require a callback: YES, PLEASE    PLEASE REVIEW AND ADVISE.

## 2022-07-18 NOTE — TELEPHONE ENCOUNTER
Is there a certain date that you would like her to be put on? It looks like her last ONB was in May.

## 2022-07-20 ENCOUNTER — OFFICE VISIT (OUTPATIENT)
Dept: NEUROLOGY | Facility: CLINIC | Age: 57
End: 2022-07-20

## 2022-07-20 VITALS — HEART RATE: 84 BPM | SYSTOLIC BLOOD PRESSURE: 122 MMHG | OXYGEN SATURATION: 98 % | DIASTOLIC BLOOD PRESSURE: 64 MMHG

## 2022-07-20 DIAGNOSIS — M54.81 BILATERAL OCCIPITAL NEURALGIA: Primary | ICD-10-CM

## 2022-07-20 PROCEDURE — 64450 NJX AA&/STRD OTHER PN/BRANCH: CPT | Performed by: PSYCHIATRY & NEUROLOGY

## 2022-07-20 PROCEDURE — 20552 NJX 1/MLT TRIGGER POINT 1/2: CPT | Performed by: PSYCHIATRY & NEUROLOGY

## 2022-07-20 PROCEDURE — 64405 NJX AA&/STRD GR OCPL NRV: CPT | Performed by: PSYCHIATRY & NEUROLOGY

## 2022-07-20 NOTE — PROGRESS NOTES
Procedure note for occipital nerve block-The patient's chart was reviewed.  After obtaining verbal consent the patient was placed in a sitting position with her neck flexed and her chin touching her chest.  Both the left and right occipital areas were prepped with antiseptic solution and injected with bupivacaine 0.5% using a 27-gauge needle.  3 cc of bupivacaine was injected at the site of the greater occipital nerve on each side and  2 cc was injected in the lesser occipital nerve on each side.  Also trigger point injections were given with 1 cc of bupivacaine in both trapezius muscles. Patient tolerated the procedure well    Of note-patient is here for another occipital nerve block.  She has had worsening neck pain and occipital headaches as she was unable to get her rhizotomy as scheduled due to her pain physician name on leave.  Her migraine headaches remain well controlled since she increased her Topamax to 125 mg twice a day.  For her fibromyalgia she was switched from Cymbalta to Savella which caused some side effects hence the dose was reduced.  She has also stopped taking amitriptyline.

## 2022-07-22 ENCOUNTER — TELEPHONE (OUTPATIENT)
Dept: NEUROLOGY | Facility: CLINIC | Age: 57
End: 2022-07-22

## 2022-07-22 NOTE — TELEPHONE ENCOUNTER
Caller: Yumiko Oswald    Relationship: Self    Best call back number: [phone]    What was the call regarding:   PT HAS INJECTION APPT ON 8-31-22 AND NEEDS TO HAVE THAT RESCHEDULED.    Do you require a callback: YES, PLEASE CALL AT NUMBER LISTED ABOVE.

## 2022-09-19 ENCOUNTER — CLINICAL SUPPORT (OUTPATIENT)
Dept: NEUROLOGY | Facility: CLINIC | Age: 57
End: 2022-09-19

## 2022-09-19 VITALS — SYSTOLIC BLOOD PRESSURE: 118 MMHG | HEART RATE: 94 BPM | DIASTOLIC BLOOD PRESSURE: 62 MMHG | OXYGEN SATURATION: 99 %

## 2022-09-19 DIAGNOSIS — M54.81 BILATERAL OCCIPITAL NEURALGIA: Primary | ICD-10-CM

## 2022-09-19 PROCEDURE — 64405 NJX AA&/STRD GR OCPL NRV: CPT | Performed by: PSYCHIATRY & NEUROLOGY

## 2022-09-19 RX ORDER — HYDROXYCHLOROQUINE SULFATE 200 MG/1
TABLET, FILM COATED ORAL
COMMUNITY
Start: 2022-08-23

## 2022-09-19 RX ORDER — AMLODIPINE BESYLATE 2.5 MG/1
TABLET ORAL
COMMUNITY
Start: 2022-08-23

## 2022-09-19 RX ORDER — ESTRADIOL 0.1 MG/G
CREAM VAGINAL
COMMUNITY
Start: 2022-08-23

## 2022-09-19 NOTE — PROGRESS NOTES
Procedure note for occipital nerve block-The patient's chart was reviewed.  After obtaining verbal consent the patient was placed in a sitting position with her neck flexed and her chin touching her chest.  Both the left and right occipital areas were prepped with antiseptic solution and injected with bupivacaine 0.5% using a 27-gauge needle.  3 cc of bupivacaine was injected at the site of the greater occipital nerve on each side and  2 cc was injected in the lesser occipital nerve on each side.  Patient tolerated the procedure well    Of note-patient is here for another occipital nerve block.  Her headaches have improved since her last occipital nerve block.  She was also able to get a medial branch block but is still waiting for insurance clearance for rhizotomy.  Her migraine headaches also remain well controlled since she increased her Topamax to 125 mg twice a day.  For her fibromyalgia she was switched from Cymbalta to Savella which caused some side effects hence the dose was reduced.  She has also stopped taking amitriptyline.

## 2022-09-21 RX ORDER — TOPIRAMATE 25 MG/1
TABLET ORAL
Qty: 60 TABLET | Refills: 5 | Status: SHIPPED | OUTPATIENT
Start: 2022-09-21 | End: 2022-11-07 | Stop reason: SDUPTHER

## 2022-09-21 RX ORDER — TOPIRAMATE 100 MG/1
100 TABLET, FILM COATED ORAL 2 TIMES DAILY
Qty: 60 TABLET | Refills: 2 | Status: SHIPPED | OUTPATIENT
Start: 2022-09-21 | End: 2022-11-07 | Stop reason: SDUPTHER

## 2022-09-21 NOTE — TELEPHONE ENCOUNTER
Rx Refill Note  Requested Prescriptions     Pending Prescriptions Disp Refills   • topiramate (TOPAMAX) 100 MG tablet [Pharmacy Med Name: TOPIRAMATE 100 MG TABLET] 60 tablet 5     Sig: Take 1.5 tablets by mouth 2 (Two) Times a Day for 30 days. Take 1 tablet twice a day   • topiramate (Topamax) 25 MG tablet 60 tablet 5     Sig: Take 1 tablet twice a day      Last filled:  Last office visit with prescribing clinician: 7/20/2022      Next office visit with prescribing clinician: 11/7/2022     Iris Willson MA  09/21/22, 11:05 EDT

## 2022-10-20 ENCOUNTER — OFFICE VISIT (OUTPATIENT)
Dept: GASTROENTEROLOGY | Facility: CLINIC | Age: 57
End: 2022-10-20

## 2022-10-20 ENCOUNTER — TRANSCRIBE ORDERS (OUTPATIENT)
Dept: PHYSICAL THERAPY | Facility: CLINIC | Age: 57
End: 2022-10-20

## 2022-10-20 VITALS
HEIGHT: 65 IN | TEMPERATURE: 98 F | WEIGHT: 135.6 LBS | OXYGEN SATURATION: 98 % | SYSTOLIC BLOOD PRESSURE: 118 MMHG | DIASTOLIC BLOOD PRESSURE: 66 MMHG | HEART RATE: 93 BPM | BODY MASS INDEX: 22.59 KG/M2

## 2022-10-20 DIAGNOSIS — M54.2 CERVICALGIA: Primary | ICD-10-CM

## 2022-10-20 DIAGNOSIS — K21.00 GASTROESOPHAGEAL REFLUX DISEASE WITH ESOPHAGITIS WITHOUT HEMORRHAGE: ICD-10-CM

## 2022-10-20 DIAGNOSIS — K58.9 IRRITABLE BOWEL SYNDROME, UNSPECIFIED TYPE: Primary | ICD-10-CM

## 2022-10-20 DIAGNOSIS — Z91.018 HISTORY OF FOOD ALLERGY: ICD-10-CM

## 2022-10-20 PROCEDURE — 99214 OFFICE O/P EST MOD 30 MIN: CPT | Performed by: PHYSICIAN ASSISTANT

## 2022-10-20 NOTE — PROGRESS NOTES
Follow Up      Patient Name: Yumiko Oswald  : 1965   MRN: [id number]     Chief Complaint:    Chief Complaint   Patient presents with   • Follow-up     3 mo follow up on GERD       History of Present Illness: Yumiko Oswald is a 57 y.o. female who is here today for follow up on GERD, IBS.    Pt continues to do well with nexium 40mg daily with rare episodes of breakthrough indigestion.    Since last visit, she met with an allergist. Pt tested positive for allergies to Yeast, cauliflower, orange, vanilla, hazelnut, spinach,y shrimp. She was told to avoid cow milk. Planning to move forward with allergy shots. She is actively avoiding these foods. She does note some increased abdominal bloating over the last few weeks and would like to repeat course of xifaxan.     Patient denies associated fever, chills, nausea, vomiting, diarrhea, constipation, hematemesis, dysphagia, hematochezia, melena, weight loss or gain, dysuria, jaundice or bruising.    Subjective      Review of Systems:   Review of Systems   Constitutional: Negative for appetite change, chills, diaphoresis, fatigue, fever, unexpected weight gain and unexpected weight loss.   HENT: Negative for drooling, facial swelling, mouth sores, rhinorrhea, sore throat, tinnitus, trouble swallowing and voice change.    Eyes: Negative.    Respiratory: Negative for cough, chest tightness and shortness of breath.    Cardiovascular: Negative for chest pain.   Gastrointestinal: Positive for abdominal distention. Negative for abdominal pain, blood in stool, constipation, diarrhea, nausea, vomiting, GERD and indigestion.   Genitourinary: Negative for dysuria, flank pain, hematuria and pelvic pain.   Musculoskeletal: Negative for arthralgias and myalgias.   Skin: Negative for color change, pallor and rash.   Psychiatric/Behavioral: Negative for hallucinations and sleep disturbance. The patient is not nervous/anxious.    All other systems reviewed and are  negative.      Medications:     Current Outpatient Medications:   •  ALPRAZolam (XANAX) 0.5 MG tablet, Xanax 0.5 mg tablet  At Onset--sammiesch, Disp: , Rfl:   •  ALPRAZolam XR (XANAX XR) 0.5 MG 24 hr tablet, , Disp: , Rfl:   •  amLODIPine (NORVASC) 2.5 MG tablet, , Disp: , Rfl:   •  Biotin 5 MG capsule, Take 10,000 mg by mouth Daily. Takes 2 tablets daily, Disp: , Rfl:   •  buPROPion XL (WELLBUTRIN XL) 300 MG 24 hr tablet, Take 300 mg by mouth Daily., Disp: , Rfl:   •  Calcium Carb-Cholecalciferol (CALCIUM 600 + D PO), Take 1 tablet by mouth Daily., Disp: , Rfl:   •  cyanocobalamin 1000 MCG/ML injection, 1,000 mcg Every 28 (Twenty-Eight) Days., Disp: , Rfl:   •  diphenhydrAMINE (BENADRYL) 25 mg capsule, Take 25 mg by mouth At Night As Needed for Itching., Disp: , Rfl:   •  esomeprazole (nexIUM) 40 MG capsule, Take 40 mg by mouth Every Morning Before Breakfast., Disp: , Rfl:   •  estradiol (ESTRACE) 0.1 MG/GM vaginal cream, , Disp: , Rfl:   •  hydroxychloroquine (PLAQUENIL) 200 MG tablet, , Disp: , Rfl:   •  Magnesium 500 MG capsule, Take 1 tablet by mouth Daily., Disp: , Rfl:   •  meloxicam (MOBIC) 15 MG tablet, meloxicam 15 mg tablet  Take 1 tablet every day by oral route., Disp: , Rfl:   •  Multiple Vitamins-Minerals (MULTIVITAMIN WITH MINERALS) tablet tablet, Take 1 tablet by mouth Daily., Disp: , Rfl:   •  ondansetron ODT (ZOFRAN-ODT) 8 MG disintegrating tablet, Take 1 tablet by mouth Every 8 (Eight) Hours As Needed for nausea or vomiting., Disp: 16 tablet, Rfl: 0  •  oxyCODONE-acetaminophen (PERCOCET) 7.5-325 MG per tablet, Take 1 tablet by mouth Every 6 (Six) Hours As Needed., Disp: , Rfl:   •  Polyethylene Glycol 3350 (MIRALAX PO), Take  by mouth., Disp: , Rfl:   •  Potassium Gluconate 550 MG tablet, potassium gluconate 550 mg (90 mg) tablet  Take by oral route., Disp: , Rfl:   •  rizatriptan (MAXALT) 10 MG tablet, Take 10 mg by mouth 1 (One) Time As Needed for Migraine. May repeat in 2 hours if needed, Disp: ,  Rfl:   •  Savella 50 MG tablet tablet, , Disp: , Rfl:   •  sodium-potassium-magnesium sulfates (Suprep Bowel Prep Kit) 17.5-3.13-1.6 GM/177ML solution oral solution, Take 1 bottle by mouth Take As Directed. Follow instructions that were mailed to your home. If you didn't receive these call [phone]., Disp: 354 mL, Rfl: 0  •  tiZANidine (ZANAFLEX) 2 MG tablet, Every 6 (Six) Hours., Disp: , Rfl:   •  topiramate (Topamax) 100 MG tablet, Take 1 tablet by mouth 2 (Two) Times a Day., Disp: 60 tablet, Rfl: 2  •  topiramate (Topamax) 25 MG tablet, Take 1 tablet twice a day, Disp: 60 tablet, Rfl: 5  •  Turmeric Curcumin 500 MG capsule, Take 1 tablet by mouth Daily., Disp: , Rfl:   •  Valerian 450 MG capsule, Take 1 tablet by mouth Daily., Disp: , Rfl:   •  Wheat Dextrin (BENEFIBER DRINK MIX PO), Benefiber, Disp: , Rfl:     Allergies:   Allergies   Allergen Reactions   • Latex Itching   • Soy Allergy Other (See Comments)   • Ultram [Tramadol] Itching     Severe itching       Social History:   Social History     Socioeconomic History   • Marital status:    Tobacco Use   • Smoking status: Never   • Smokeless tobacco: Never   Vaping Use   • Vaping Use: Never used   Substance and Sexual Activity   • Alcohol use: No   • Drug use: No   • Sexual activity: Defer     Birth control/protection: Post-menopausal        Surgical History:   Past Surgical History:   Procedure Laterality Date   • BACK SURGERY     • BARIATRIC SURGERY     • CHOLECYSTECTOMY N/A 06/07/2018    Procedure: CHOLECYSTECTOMY LAPAROSCOPIC;  Surgeon: Romana Torre MD;  Location:  [facility];  Service: General   • COLONOSCOPY     • DILATATION AND CURETTAGE     • ENDOSCOPY     • GASTRIC BYPASS  2002   • HYSTERECTOMY     • ROTATOR CUFF REPAIR      bilateral   • TONSILLECTOMY     • TOTAL HIP ARTHROPLASTY Left 10/04/2016    Procedure: TOTAL HIP ARTHROPLASTY LEFT;  Surgeon: Arvind Oh MD;  Location:  [facility];  Service:    • TOTAL HIP ARTHROPLASTY  "Left    • UPPER GASTROINTESTINAL ENDOSCOPY          Medical History:   Past Medical History:   Diagnosis Date   • Anxiety    • Arthritis    • Depression    • Kimberly-Danlos disease    • GERD (gastroesophageal reflux disease)    • GI bleed    • History of transfusion     2 UNITS   • Migraine    • Restless legs    • Wears eyeglasses         Objective     Physical Exam:  Vital Signs:   Vitals:    10/20/22 1248   BP: 118/66   BP Location: Right arm   Patient Position: Sitting   Cuff Size: Adult   Pulse: 93   Temp: 98 °F (36.7 °C)   TempSrc: Temporal   SpO2: 98%   Weight: 61.5 kg (135 lb 9.6 oz)   Height: 165.1 cm (65\")     Body mass index is 22.57 kg/m².     Physical Exam  Vitals and nursing note reviewed.   Constitutional:       Appearance: Normal appearance. She is normal weight. She is not ill-appearing or diaphoretic.   HENT:      Head: Normocephalic and atraumatic.      Right Ear: External ear normal.      Left Ear: External ear normal.      Nose: Nose normal. No rhinorrhea.      Mouth/Throat:      Mouth: Mucous membranes are moist.      Pharynx: Oropharynx is clear. No posterior oropharyngeal erythema.   Eyes:      General:         Right eye: No discharge.         Left eye: No discharge.      Conjunctiva/sclera: Conjunctivae normal.      Pupils: Pupils are equal, round, and reactive to light.   Neck:      Thyroid: No thyromegaly.   Cardiovascular:      Rate and Rhythm: Normal rate and regular rhythm.      Pulses: Normal pulses.      Heart sounds: Normal heart sounds. No murmur heard.  Pulmonary:      Effort: Pulmonary effort is normal.      Breath sounds: Normal breath sounds. No wheezing.   Abdominal:      General: Abdomen is flat. Bowel sounds are normal. There is no distension.      Tenderness: There is no abdominal tenderness. There is no guarding or rebound.   Musculoskeletal:         General: No tenderness. Normal range of motion.      Cervical back: Normal range of motion and neck supple.      Right lower " leg: No edema.      Left lower leg: No edema.   Skin:     General: Skin is warm and dry.      Capillary Refill: Capillary refill takes less than 2 seconds.      Coloration: Skin is not jaundiced.      Findings: No bruising.   Neurological:      General: No focal deficit present.      Mental Status: She is oriented to person, place, and time.      Cranial Nerves: No cranial nerve deficit.   Psychiatric:         Mood and Affect: Mood normal.         Thought Content: Thought content normal.         Judgment: Judgment normal.         Assessment / Plan      Assessment/Plan:   There are no diagnoses linked to this encounter.     GERD  IBS  Food allergies   - continue esomeprazole 40mg daily   - refill for xifaxan 550mg TID x 14 days with 1 refill sent to pharmacy   - pt given GERD diet instructions, advised to avoid GI irritants such as caffeine, carbonation, EtOH, tobacco, chocolate, peppermint, acid-based foods   - continue to avoid known allergen-containing foods   - follow up in clinic in 6mo, or after completion of above studies   - call clinic at any time for questions or new / worsened sx    Follow Up:   Return in about 6 months (around 4/20/2023).    Plan of care reviewed with the patient at the conclusion of today's visit.  Education was provided regarding diagnosis, management, and any prescribed or recommended OTC medications.  Patient verbalized understanding of and agreement with management plan.     NOTE TO PATIENT: The 21st Century Cures Act makes medical notes like these available to patients in the interest of transparency. However, be advised this is a medical document. It is intended as peer to peer communication. It is written in medical language and may contain abbreviations or verbiage that are unfamiliar. It may appear blunt or direct. Medical documents are intended to carry relevant information, facts as evident, and the clinical opinion of the practitioner.     Time Statement:   Discussed plan of  care in detail with patient today. Patient verbally understands and agrees. I have spent 30 minutes reviewing available diagnostics, obtaining history, examining the patient, developing a treatment plan, and educating the patient on disease process and plan of care.     Lizzy Lamas PA-C   [facility]

## 2022-11-07 ENCOUNTER — CLINICAL SUPPORT (OUTPATIENT)
Dept: NEUROLOGY | Facility: CLINIC | Age: 57
End: 2022-11-07

## 2022-11-07 VITALS — DIASTOLIC BLOOD PRESSURE: 72 MMHG | SYSTOLIC BLOOD PRESSURE: 132 MMHG | HEART RATE: 70 BPM

## 2022-11-07 DIAGNOSIS — M54.81 BILATERAL OCCIPITAL NEURALGIA: Primary | ICD-10-CM

## 2022-11-07 PROCEDURE — 64405 NJX AA&/STRD GR OCPL NRV: CPT | Performed by: PSYCHIATRY & NEUROLOGY

## 2022-11-07 PROCEDURE — 20552 NJX 1/MLT TRIGGER POINT 1/2: CPT | Performed by: PSYCHIATRY & NEUROLOGY

## 2022-11-07 RX ORDER — TOPIRAMATE 100 MG/1
100 TABLET, FILM COATED ORAL 2 TIMES DAILY
Qty: 60 TABLET | Refills: 6 | Status: SHIPPED | OUTPATIENT
Start: 2022-11-07

## 2022-11-07 RX ORDER — TOPIRAMATE 25 MG/1
TABLET ORAL
Qty: 60 TABLET | Refills: 6 | Status: SHIPPED | OUTPATIENT
Start: 2022-11-07

## 2022-11-07 RX ORDER — DULOXETIN HYDROCHLORIDE 60 MG/1
90 CAPSULE, DELAYED RELEASE ORAL DAILY
COMMUNITY

## 2022-11-07 NOTE — PROGRESS NOTES
Procedure note for occipital nerve block-The patient's chart was reviewed.  After obtaining verbal consent the patient was placed in a sitting position with her neck flexed and her chin touching her chest.  Both the left and right occipital areas were prepped with antiseptic solution and injected with lidocaine 2% using a 27-gauge needle and Solu-Medrol 125 mg in a ratio of 4:1.  3 cc of the solution was injected at the site of the greater occipital nerve on each side and  2 cc was injected in the lesser occipital nerve on each side.  Trigger point injection was also done in her right paracervical muscle with 1 cc of the solution.  Patient tolerated the procedure well    Of note-patient states that her headaches are well controlled since she also got a rhizotomy at the cervical spine level which has helped with the headaches however has caused her to have neck pain due to a muscle spasm.  She continues to take Topamax 125 mg twice a day and Savella for her fibromyalgia.  Stopped taking amitriptyline.

## 2023-01-23 ENCOUNTER — OFFICE VISIT (OUTPATIENT)
Dept: NEUROLOGY | Facility: CLINIC | Age: 58
End: 2023-01-23
Payer: MEDICARE

## 2023-01-23 VITALS
SYSTOLIC BLOOD PRESSURE: 144 MMHG | BODY MASS INDEX: 23.99 KG/M2 | WEIGHT: 144 LBS | HEART RATE: 91 BPM | DIASTOLIC BLOOD PRESSURE: 86 MMHG | HEIGHT: 65 IN | OXYGEN SATURATION: 99 %

## 2023-01-23 DIAGNOSIS — M54.81 BILATERAL OCCIPITAL NEURALGIA: Primary | ICD-10-CM

## 2023-01-23 PROCEDURE — 64405 NJX AA&/STRD GR OCPL NRV: CPT | Performed by: PSYCHIATRY & NEUROLOGY

## 2023-01-23 PROCEDURE — 20552 NJX 1/MLT TRIGGER POINT 1/2: CPT | Performed by: PSYCHIATRY & NEUROLOGY

## 2023-01-23 RX ORDER — AMITRIPTYLINE HYDROCHLORIDE 25 MG/1
TABLET, FILM COATED ORAL
COMMUNITY
Start: 2023-01-21

## 2023-01-23 RX ORDER — DULOXETIN HYDROCHLORIDE 30 MG/1
CAPSULE, DELAYED RELEASE ORAL
COMMUNITY
Start: 2022-11-22

## 2023-01-23 RX ORDER — CYCLOBENZAPRINE HCL 10 MG
TABLET ORAL
COMMUNITY
Start: 2023-01-21

## 2023-01-23 RX ORDER — TRAZODONE HYDROCHLORIDE 100 MG/1
TABLET ORAL
COMMUNITY
Start: 2023-01-21

## 2023-01-23 NOTE — PROGRESS NOTES
Procedure note for occipital nerve block-The patient's chart was reviewed.  After obtaining verbal consent the patient was placed in a sitting position with her neck flexed and her chin touching her chest.  Both the left and right occipital areas were prepped with antiseptic solution and injected with bupivacaine 0.5 %. 3 cc of bupivacaine  was injected at the site of the greater occipital nerve on each side and  2 cc was injected in the lesser occipital nerve on each side.  Trigger point injection was also done in her right trapezius muscle with 1 cc of the solution.  Patient tolerated the procedure well    Of note-patient states that her headaches and neck pain are well controlled for now.  She continues to take Topamax 125 mg twice a day and Cymbalta for her fibromyalgia.

## 2023-02-28 ENCOUNTER — TRANSCRIBE ORDERS (OUTPATIENT)
Dept: ADMINISTRATIVE | Facility: HOSPITAL | Age: 58
End: 2023-02-28
Payer: MEDICARE

## 2023-02-28 DIAGNOSIS — Z12.31 VISIT FOR SCREENING MAMMOGRAM: Primary | ICD-10-CM

## 2023-03-27 ENCOUNTER — CLINICAL SUPPORT (OUTPATIENT)
Dept: NEUROLOGY | Facility: CLINIC | Age: 58
End: 2023-03-27
Payer: MEDICARE

## 2023-03-27 VITALS — DIASTOLIC BLOOD PRESSURE: 84 MMHG | SYSTOLIC BLOOD PRESSURE: 136 MMHG

## 2023-03-27 DIAGNOSIS — M54.81 BILATERAL OCCIPITAL NEURALGIA: Primary | ICD-10-CM

## 2023-03-27 PROCEDURE — 64405 NJX AA&/STRD GR OCPL NRV: CPT | Performed by: PSYCHIATRY & NEUROLOGY

## 2023-03-27 PROCEDURE — 64450 NJX AA&/STRD OTHER PN/BRANCH: CPT | Performed by: PSYCHIATRY & NEUROLOGY

## 2023-03-27 NOTE — PROGRESS NOTES
Procedure note for occipital nerve block-The patient's chart was reviewed.  After obtaining verbal consent the patient was placed in a sitting position with her neck flexed and her chin touching her chest.  Both the left and right occipital areas were prepped with antiseptic solution and injected with bupivacaine 0.5 %. 3 cc of bupivacaine  was injected at the site of the greater occipital nerve on each side and  2 cc was injected in the lesser occipital nerve on each side.  Patient tolerated the procedure well.    Of note-patient states that her headaches have recently were worsened due to the constant weather changes.  She continues to take Topamax 125 mg twice a day and Maxalt as needed.  Is tapering off of her Cymbalta as it makes her blood pressure go up.  Continues to be on Wellbutrin for her mood

## 2023-04-20 ENCOUNTER — OFFICE VISIT (OUTPATIENT)
Dept: GASTROENTEROLOGY | Facility: CLINIC | Age: 58
End: 2023-04-20
Payer: MEDICARE

## 2023-04-20 ENCOUNTER — PRIOR AUTHORIZATION (OUTPATIENT)
Dept: GASTROENTEROLOGY | Facility: CLINIC | Age: 58
End: 2023-04-20
Payer: MEDICARE

## 2023-04-20 VITALS
HEART RATE: 82 BPM | TEMPERATURE: 97.1 F | SYSTOLIC BLOOD PRESSURE: 130 MMHG | OXYGEN SATURATION: 99 % | WEIGHT: 147 LBS | DIASTOLIC BLOOD PRESSURE: 82 MMHG | BODY MASS INDEX: 24.46 KG/M2

## 2023-04-20 DIAGNOSIS — Z90.3 H/O GASTRIC SLEEVE: ICD-10-CM

## 2023-04-20 DIAGNOSIS — K58.9 IRRITABLE BOWEL SYNDROME, UNSPECIFIED TYPE: Primary | ICD-10-CM

## 2023-04-20 DIAGNOSIS — R14.0 BLOATING: ICD-10-CM

## 2023-04-20 DIAGNOSIS — K21.00 GASTROESOPHAGEAL REFLUX DISEASE WITH ESOPHAGITIS WITHOUT HEMORRHAGE: ICD-10-CM

## 2023-04-20 RX ORDER — TRIAMCINOLONE ACETONIDE 1 MG/G
CREAM TOPICAL
COMMUNITY
Start: 2023-04-06

## 2023-04-20 RX ORDER — LISINOPRIL 10 MG/1
TABLET ORAL
COMMUNITY
Start: 2023-02-28

## 2023-04-20 RX ORDER — ESOMEPRAZOLE MAGNESIUM 40 MG/1
40 CAPSULE, DELAYED RELEASE ORAL 2 TIMES DAILY
Qty: 60 CAPSULE | Refills: 5 | Status: SHIPPED | OUTPATIENT
Start: 2023-04-20

## 2023-04-20 RX ORDER — AMLODIPINE BESYLATE 5 MG/1
TABLET ORAL
COMMUNITY
Start: 2023-02-28

## 2023-04-20 NOTE — TELEPHONE ENCOUNTER
Approvedtoday  PA Case: [id number], Status: Approved, Coverage Starts on: 1/19/2023 12:00:00 AM, Coverage Ends on: 4/19/2024 12:00:00 AM.

## 2023-04-20 NOTE — PROGRESS NOTES
Follow Up      Patient Name: Yumiko Oswald  : 1965   MRN: [id number]     Chief Complaint:    Chief Complaint   Patient presents with   • Follow-up     Bloating and gas       History of Present Illness: Yumiko Oswald is a 57 y.o. female who is here today for follow up on GERD, IBS.    Since last visit, pt has undergone divorce and moved. She states that her overall stress level is improving at this time. She does report worsened esophageal reflux, epigastric pain and indigestion sx, worse if she eats after 4pm or consumes caffeine / other GERD-trigger foods. She is taking esomeprazole 40mg daily and reports breakthrough sx daily.    She has had recurrence of lower abdominal cramping and generalized bloating. She has sx about four days per week, worse in morning and somewhat alleviated with BM. She generally has a few large, loose BM per day. She had a few months of relief after last course of xifaxan, and has continued to be diligent about avoiding known food allergens / high FODMAP foods.     Patient denies associated fever, chills, nausea, vomiting, constipation, hematemesis, dysphagia, hematochezia, melena, weight loss or gain, dysuria, jaundice or bruising.    EGD / CSY  with Dr. Martinez. Normal upper / middle third of esophagus. Small hiadal hernia. Z-line 39cm from incisors. LA Grade A esophagitis. Evidence of gastric bypass, gastric pouch about 3cm. Normal jejunum, anastomosis patent with staple present. Bx negative for celiac disease, H Pylori, EoE. Good bowel prep conditions. Normal terminal ileum. Nonbleeding internal hemorrhoids. Diverticulosis without acute diverticulitis in sigmoid and descending colon. Normal appearing colonic mucosa otherwise. Bx negative for microscopic colitis.    Subjective      Review of Systems:   Review of Systems   Constitutional: Negative for appetite change, chills, diaphoresis, fatigue, fever, unexpected weight gain and unexpected weight loss.    HENT: Negative for drooling, facial swelling, mouth sores, rhinorrhea, sore throat, tinnitus, trouble swallowing and voice change.    Eyes: Negative.    Respiratory: Negative for cough, chest tightness and shortness of breath.    Cardiovascular: Negative for chest pain.   Gastrointestinal: Positive for abdominal distention, abdominal pain (lower cramping), diarrhea (loose stools), GERD and indigestion. Negative for blood in stool, constipation, nausea and vomiting.   Genitourinary: Negative for dysuria, flank pain, hematuria and pelvic pain.   Skin: Negative for color change, pallor and rash.   Neurological: Negative for tremors and syncope.   Psychiatric/Behavioral: Negative for hallucinations and sleep disturbance. The patient is not nervous/anxious.    All other systems reviewed and are negative.      Medications:     Current Outpatient Medications:   •  ALPRAZolam XR (XANAX XR) 0.5 MG 24 hr tablet, , Disp: , Rfl:   •  amLODIPine (NORVASC) 2.5 MG tablet, , Disp: , Rfl:   •  amLODIPine (NORVASC) 5 MG tablet, , Disp: , Rfl:   •  Biotin 5 MG capsule, Take 2,000 capsules by mouth Daily. Takes 2 tablets daily, Disp: , Rfl:   •  buPROPion XL (WELLBUTRIN XL) 300 MG 24 hr tablet, Take 1 tablet by mouth Daily., Disp: , Rfl:   •  Calcium Carb-Cholecalciferol (CALCIUM 600 + D PO), Take 1 tablet by mouth Daily., Disp: , Rfl:   •  cyanocobalamin 1000 MCG/ML injection, 1 mL Every 28 (Twenty-Eight) Days., Disp: , Rfl:   •  diphenhydrAMINE (BENADRYL) 25 mg capsule, Take 1 capsule by mouth At Night As Needed for Itching., Disp: , Rfl:   •  DULoxetine (CYMBALTA) 30 MG capsule, , Disp: , Rfl:   •  DULoxetine (CYMBALTA) 60 MG capsule, Take 90 mg by mouth Daily., Disp: , Rfl:   •  esomeprazole (nexIUM) 40 MG capsule, Take 1 capsule by mouth Every Morning Before Breakfast., Disp: , Rfl:   •  estradiol (ESTRACE) 0.1 MG/GM vaginal cream, , Disp: , Rfl:   •  hydroxychloroquine (PLAQUENIL) 200 MG tablet, , Disp: , Rfl:   •  lisinopril  (PRINIVIL,ZESTRIL) 10 MG tablet, , Disp: , Rfl:   •  Magnesium 500 MG capsule, Take 1 tablet by mouth Daily., Disp: , Rfl:   •  meloxicam (MOBIC) 15 MG tablet, meloxicam 15 mg tablet  Take 1 tablet every day by oral route., Disp: , Rfl:   •  Multiple Vitamins-Minerals (MULTIVITAMIN WITH MINERALS) tablet tablet, Take 1 tablet by mouth Daily., Disp: , Rfl:   •  ondansetron ODT (ZOFRAN-ODT) 8 MG disintegrating tablet, Take 1 tablet by mouth Every 8 (Eight) Hours As Needed for nausea or vomiting., Disp: 16 tablet, Rfl: 0  •  oxyCODONE-acetaminophen (PERCOCET) 7.5-325 MG per tablet, Take 1 tablet by mouth Every 6 (Six) Hours As Needed., Disp: , Rfl:   •  Potassium Gluconate 550 MG tablet, potassium gluconate 550 mg (90 mg) tablet  Take by oral route., Disp: , Rfl:   •  rizatriptan (MAXALT) 10 MG tablet, Take 1 tablet by mouth 1 (One) Time As Needed for Migraine. May repeat in 2 hours if needed, Disp: , Rfl:   •  Savella 50 MG tablet tablet, , Disp: , Rfl:   •  tiZANidine (ZANAFLEX) 2 MG tablet, Every 6 (Six) Hours., Disp: , Rfl:   •  topiramate (Topamax) 100 MG tablet, Take 1 tablet by mouth 2 (Two) Times a Day., Disp: 60 tablet, Rfl: 6  •  topiramate (Topamax) 25 MG tablet, Take 1 tablet twice a day, Disp: 60 tablet, Rfl: 6  •  traZODone (DESYREL) 100 MG tablet, , Disp: , Rfl:   •  triamcinolone (KENALOG) 0.1 % cream, , Disp: , Rfl:   •  Turmeric Curcumin 500 MG capsule, Take 1 tablet by mouth Daily., Disp: , Rfl:   •  Valerian 450 MG capsule, Take 1 tablet by mouth Daily., Disp: , Rfl:   •  Wheat Dextrin (BENEFIBER DRINK MIX PO), Benefiber, Disp: , Rfl:     Allergies:   Allergies   Allergen Reactions   • Latex Itching   • Soy Allergy Other (See Comments)   • Ultram [Tramadol] Itching     Severe itching       Social History:   Social History     Socioeconomic History   • Marital status:    Tobacco Use   • Smoking status: Never     Passive exposure: Never   • Smokeless tobacco: Never   Vaping Use   • Vaping Use:  Never used   Substance and Sexual Activity   • Alcohol use: No   • Drug use: No   • Sexual activity: Defer     Birth control/protection: Post-menopausal        Surgical History:   Past Surgical History:   Procedure Laterality Date   • BACK SURGERY     • BARIATRIC SURGERY     • CHOLECYSTECTOMY N/A 06/07/2018    Procedure: CHOLECYSTECTOMY LAPAROSCOPIC;  Surgeon: Romana Torre MD;  Location: [facility];  Service: General   • COLONOSCOPY     • DILATATION AND CURETTAGE     • ENDOSCOPY     • GASTRIC BYPASS  2002   • HYSTERECTOMY     • ROTATOR CUFF REPAIR      bilateral   • TONSILLECTOMY     • TOTAL HIP ARTHROPLASTY Left 10/04/2016    Procedure: TOTAL HIP ARTHROPLASTY LEFT;  Surgeon: Arvind Oh MD;  Location: [facility];  Service:    • TOTAL HIP ARTHROPLASTY Left    • UPPER GASTROINTESTINAL ENDOSCOPY          Medical History:   Past Medical History:   Diagnosis Date   • Anxiety    • Arthritis    • Depression    • Kimberly-Danlos disease    • GERD (gastroesophageal reflux disease)    • GI bleed    • History of transfusion     2 UNITS   • Migraine    • Restless legs    • Wears eyeglasses         Objective     Physical Exam:  Vital Signs:   Vitals:    04/20/23 1021   BP: 130/82   Pulse: 82   Temp: 97.1 °F (36.2 °C)   SpO2: 99%   Weight: 66.7 kg (147 lb)     Body mass index is 24.46 kg/m².     Physical Exam  Vitals and nursing note reviewed.   Constitutional:       Appearance: Normal appearance. She is normal weight. She is not ill-appearing or diaphoretic.   HENT:      Head: Normocephalic and atraumatic.      Right Ear: External ear normal.      Left Ear: External ear normal.      Nose: Nose normal. No rhinorrhea.      Mouth/Throat:      Mouth: Mucous membranes are moist.      Pharynx: Oropharynx is clear.   Eyes:      Conjunctiva/sclera: Conjunctivae normal.      Pupils: Pupils are equal, round, and reactive to light.   Neck:      Thyroid: No thyromegaly.   Cardiovascular:      Rate and Rhythm: Normal rate and  regular rhythm.      Pulses: Normal pulses.      Heart sounds: Normal heart sounds.   Pulmonary:      Effort: Pulmonary effort is normal.      Breath sounds: Normal breath sounds.   Abdominal:      General: Abdomen is flat. Bowel sounds are normal. There is no distension.      Tenderness: There is abdominal tenderness (mild, bilateral lower quadrants).   Musculoskeletal:         General: Normal range of motion.      Cervical back: Normal range of motion and neck supple.   Skin:     General: Skin is warm and dry.   Neurological:      General: No focal deficit present.      Mental Status: She is oriented to person, place, and time.   Psychiatric:         Mood and Affect: Mood normal.         Assessment / Plan      Assessment/Plan:   There are no diagnoses linked to this encounter.     IBS  Bloating  GERD  H/o gastric sleeve   - increase esomeprazole to 40mg BID   - pt afforded samples of Creon, advised to call clinic in 1-2 weeks with sx update   - pt given GERD diet instructions, advised to avoid GI irritants such as caffeine, carbonation, EtOH, tobacco, chocolate, peppermint, acid-based foods   - continue low FODMAP diet, avoidance of known food allergens   - obtain C13 breath test   - follow up in clinic in 3mo, or after completion of above studies   - call clinic at any time for questions or new / worsened sx    Follow Up:   Return in about 3 months (around 7/20/2023).    Plan of care reviewed with the patient at the conclusion of today's visit.  Education was provided regarding diagnosis, management, and any prescribed or recommended OTC medications.  Patient verbalized understanding of and agreement with management plan.     NOTE TO PATIENT: The 21st Century Cures Act makes medical notes like these available to patients in the interest of transparency. However, be advised this is a medical document. It is intended as peer to peer communication. It is written in medical language and may contain abbreviations or  verbiage that are unfamiliar. It may appear blunt or direct. Medical documents are intended to carry relevant information, facts as evident, and the clinical opinion of the practitioner.     Time Statement:   Discussed plan of care in detail with patient today. Patient verbally understands and agrees. I have spent 30 minutes reviewing available diagnostics, obtaining history, examining the patient, developing a treatment plan, and educating the patient on disease process and plan of care.     Lizzy Lamas PA-C   [facility]

## 2023-05-04 ENCOUNTER — HOSPITAL ENCOUNTER (OUTPATIENT)
Dept: MAMMOGRAPHY | Facility: HOSPITAL | Age: 58
Discharge: HOME | End: 2023-05-04
Admitting: INTERNAL MEDICINE
Payer: MEDICARE

## 2023-05-04 DIAGNOSIS — Z12.31 VISIT FOR SCREENING MAMMOGRAM: ICD-10-CM

## 2023-05-04 PROCEDURE — 77067 SCR MAMMO BI INCL CAD: CPT

## 2023-05-04 PROCEDURE — 77063 BREAST TOMOSYNTHESIS BI: CPT

## 2023-05-05 ENCOUNTER — TELEPHONE (OUTPATIENT)
Dept: GASTROENTEROLOGY | Facility: CLINIC | Age: 58
End: 2023-05-05
Payer: MEDICARE

## 2023-05-11 ENCOUNTER — TELEPHONE (OUTPATIENT)
Dept: NEUROLOGY | Facility: CLINIC | Age: 58
End: 2023-05-11

## 2023-05-11 NOTE — TELEPHONE ENCOUNTER
Caller: Yumiko Oswald    Relationship: Self    Best call back number: [phone]    Do you know the name of the person who called: NO    What was the call regarding: PT STATES SHE MISSED A PHONE CALL FROM THE CLINIC THIS MORNING. PT IS UNSURE OF WHO HAD TRIED TO CALL AS SHE DOES NOT HAVE VM/KNOW HOW TO ACCESS HER VM.    PLEASE CONTACT PT IF OFFICE WAS TRYING TO REACH PT.    Do you require a callback: YES, IF NEEDED.    PLEASE REVIEW AND ADVISE.

## 2023-05-17 ENCOUNTER — CLINICAL SUPPORT (OUTPATIENT)
Dept: NEUROLOGY | Facility: CLINIC | Age: 58
End: 2023-05-17
Payer: MEDICARE

## 2023-05-17 ENCOUNTER — TELEPHONE (OUTPATIENT)
Dept: NEUROLOGY | Facility: CLINIC | Age: 58
End: 2023-05-17

## 2023-05-17 VITALS — HEIGHT: 65 IN | BODY MASS INDEX: 24.46 KG/M2

## 2023-05-17 DIAGNOSIS — M54.81 BILATERAL OCCIPITAL NEURALGIA: Primary | ICD-10-CM

## 2023-05-17 NOTE — PROGRESS NOTES
Procedure note for occipital nerve block-The patient's chart was reviewed.  After obtaining verbal consent the patient was placed in a sitting position with her neck flexed and her chin touching her chest.  Both the left and right occipital areas were prepped with antiseptic solution and injected with bupivacaine 0.5 %. 3 cc of bupivacaine  was injected at the site of the greater occipital nerve on each side and  2 cc was injected in the lesser occipital nerve on each side.  Also 1 trigger point injection was given in the right trapezius muscle with 1 cc of bupivacaine.  Patient tolerated the procedure well.    Of note-patient states that her headaches have been well controlled recently.  She continues to take Topamax 125 mg twice a day and Maxalt as needed.   Continues to be on Wellbutrin for her mood

## 2023-05-17 NOTE — TELEPHONE ENCOUNTER
Yumiko Young needs to come in to receive her ONB in the next 6-8 weeks. Would you mind looking at your schedule and letting me know where I can appoint Yumiko? Thank you

## 2023-05-25 ENCOUNTER — TELEPHONE (OUTPATIENT)
Dept: GASTROENTEROLOGY | Facility: CLINIC | Age: 58
End: 2023-05-25
Payer: MEDICARE

## 2023-05-25 DIAGNOSIS — K58.9 IRRITABLE BOWEL SYNDROME, UNSPECIFIED TYPE: Primary | ICD-10-CM

## 2023-05-25 DIAGNOSIS — R14.0 BLOATING: ICD-10-CM

## 2023-05-25 RX ORDER — PANCRELIPASE 36000; 180000; 114000 [USP'U]/1; [USP'U]/1; [USP'U]/1
72000 CAPSULE, DELAYED RELEASE PELLETS ORAL
Qty: 210 CAPSULE | Refills: 5 | Status: SHIPPED | OUTPATIENT
Start: 2023-05-25

## 2023-07-24 ENCOUNTER — OFFICE VISIT (OUTPATIENT)
Dept: GASTROENTEROLOGY | Facility: CLINIC | Age: 58
End: 2023-07-24
Payer: MEDICARE

## 2023-07-24 VITALS
DIASTOLIC BLOOD PRESSURE: 78 MMHG | BODY MASS INDEX: 24.19 KG/M2 | OXYGEN SATURATION: 99 % | HEIGHT: 65 IN | WEIGHT: 145.2 LBS | HEART RATE: 94 BPM | TEMPERATURE: 96.9 F | SYSTOLIC BLOOD PRESSURE: 126 MMHG

## 2023-07-24 DIAGNOSIS — K58.9 IRRITABLE BOWEL SYNDROME, UNSPECIFIED TYPE: Primary | ICD-10-CM

## 2023-07-24 DIAGNOSIS — R14.0 BLOATING: ICD-10-CM

## 2023-07-24 DIAGNOSIS — K21.00 GASTROESOPHAGEAL REFLUX DISEASE WITH ESOPHAGITIS WITHOUT HEMORRHAGE: ICD-10-CM

## 2023-07-24 DIAGNOSIS — Z90.3 H/O GASTRIC SLEEVE: ICD-10-CM

## 2023-07-24 PROCEDURE — 3074F SYST BP LT 130 MM HG: CPT | Performed by: PHYSICIAN ASSISTANT

## 2023-07-24 PROCEDURE — 1160F RVW MEDS BY RX/DR IN RCRD: CPT | Performed by: PHYSICIAN ASSISTANT

## 2023-07-24 PROCEDURE — 3078F DIAST BP <80 MM HG: CPT | Performed by: PHYSICIAN ASSISTANT

## 2023-07-24 PROCEDURE — 99214 OFFICE O/P EST MOD 30 MIN: CPT | Performed by: PHYSICIAN ASSISTANT

## 2023-07-24 PROCEDURE — 1159F MED LIST DOCD IN RCRD: CPT | Performed by: PHYSICIAN ASSISTANT

## 2023-07-24 NOTE — PROGRESS NOTES
Follow Up      Patient Name: Yumiko Oswald  : 1965   MRN: [id number]     Chief Complaint:    Chief Complaint   Patient presents with    Irritable Bowel Syndrome     Follow up       History of Present Illness: Yumiko Oswald is a 57 y.o. female who is here today for follow up on GERD, IBS.     Since last visit, sucrose breath test was normal. She has done well with Creon for persistent bloating, intermittent abdominal pain. GERD is improved with esomeprazole 40mg BID without breakthrough indigestion.     Patient denies associated fever, chills, nausea, vomiting, diarrhea, constipation, hematemesis, dysphagia, hematochezia, melena, weight loss or gain, dysuria, jaundice or bruising.    EGD / CSY  with Dr. Martinez. Normal upper / middle third of esophagus. Small hiadal hernia. Z-line 39cm from incisors. LA Grade A esophagitis. Evidence of gastric bypass, gastric pouch about 3cm. Normal jejunum, anastomosis patent with staple present. Bx negative for celiac disease, H Pylori, EoE. Good bowel prep conditions. Normal terminal ileum. Nonbleeding internal hemorrhoids. Diverticulosis without acute diverticulitis in sigmoid and descending colon. Normal appearing colonic mucosa otherwise. Bx negative for microscopic colitis.     Subjective      Review of Systems:   Review of Systems   Constitutional:  Negative for appetite change, chills, diaphoresis, fatigue, fever, unexpected weight gain and unexpected weight loss.   HENT:  Negative for drooling, facial swelling, mouth sores, rhinorrhea, sore throat, tinnitus, trouble swallowing and voice change.    Eyes: Negative.    Respiratory:  Negative for cough, chest tightness and shortness of breath.    Cardiovascular:  Negative for chest pain.   Gastrointestinal:  Positive for abdominal distention (intermittent) and abdominal pain (mild, intermittent). Negative for blood in stool, constipation, diarrhea, nausea, vomiting, GERD and indigestion.    Genitourinary:  Negative for dysuria, flank pain, hematuria and pelvic pain.   Musculoskeletal:  Negative for arthralgias and myalgias.   Skin:  Negative for color change, pallor and rash.   Neurological:  Negative for dizziness, tremors, syncope, weakness and numbness.   Psychiatric/Behavioral:  Negative for hallucinations and sleep disturbance. The patient is not nervous/anxious.    All other systems reviewed and are negative.    Medications:     Current Outpatient Medications:     ALPRAZolam XR (XANAX XR) 0.5 MG 24 hr tablet, Take 1 tablet by mouth As Needed., Disp: , Rfl:     amLODIPine (NORVASC) 5 MG tablet, , Disp: , Rfl:     Biotin 5 MG capsule, Take 2,000 capsules by mouth Daily. Takes 2 tablets daily, Disp: , Rfl:     buPROPion XL (WELLBUTRIN XL) 300 MG 24 hr tablet, Take 1 tablet by mouth Daily., Disp: , Rfl:     cyanocobalamin 1000 MCG/ML injection, 1 mL Every 28 (Twenty-Eight) Days., Disp: , Rfl:     diphenhydrAMINE (BENADRYL) 25 mg capsule, Take 1 capsule by mouth At Night As Needed for Itching., Disp: , Rfl:     DULoxetine (CYMBALTA) 60 MG capsule, Take 90 mg by mouth Daily., Disp: , Rfl:     esomeprazole (nexIUM) 40 MG capsule, Take 1 capsule by mouth 2 (Two) Times a Day., Disp: 60 capsule, Rfl: 5    estradiol (ESTRACE) 0.1 MG/GM vaginal cream, , Disp: , Rfl:     hydroxychloroquine (PLAQUENIL) 200 MG tablet, , Disp: , Rfl:     lisinopril (PRINIVIL,ZESTRIL) 10 MG tablet, , Disp: , Rfl:     Magnesium 500 MG capsule, Take 1 tablet by mouth Daily., Disp: , Rfl:     meloxicam (MOBIC) 15 MG tablet, meloxicam 15 mg tablet  Take 1 tablet every day by oral route., Disp: , Rfl:     Multiple Vitamins-Minerals (MULTIVITAMIN WITH MINERALS) tablet tablet, Take 1 tablet by mouth Daily., Disp: , Rfl:     ondansetron ODT (ZOFRAN-ODT) 8 MG disintegrating tablet, Take 1 tablet by mouth Every 8 (Eight) Hours As Needed for nausea or vomiting., Disp: 16 tablet, Rfl: 0    oxyCODONE-acetaminophen (PERCOCET) 7.5-325 MG per  tablet, Take 1 tablet by mouth Every 6 (Six) Hours As Needed., Disp: , Rfl:     Pancrelipase, Lip-Prot-Amyl, (Creon) 75319-425757 units capsule delayed-release particles capsule, Take 2 capsules by mouth 3 (Three) Times a Day With Meals. Take 1 capsule prior to snack., Disp: 210 capsule, Rfl: 5    Potassium Gluconate 550 MG tablet, 4 90mg tablets a day., Disp: , Rfl:     rizatriptan (MAXALT) 10 MG tablet, Take 1 tablet by mouth 1 (One) Time As Needed for Migraine. May repeat in 2 hours if needed, Disp: , Rfl:     Savella 50 MG tablet tablet, , Disp: , Rfl:     tiZANidine (ZANAFLEX) 2 MG tablet, Every 6 (Six) Hours., Disp: , Rfl:     topiramate (TOPAMAX) 100 MG tablet, TAKE ONE TABLET BY MOUTH TWICE A DAY, Disp: 60 tablet, Rfl: 6    topiramate (Topamax) 25 MG tablet, Take 1 tablet twice a day, Disp: 60 tablet, Rfl: 6    traZODone (DESYREL) 100 MG tablet, , Disp: , Rfl:     Wheat Dextrin (BENEFIBER DRINK MIX PO), Benefiber, Disp: , Rfl:     amLODIPine (NORVASC) 2.5 MG tablet, , Disp: , Rfl:     DULoxetine (CYMBALTA) 30 MG capsule, , Disp: , Rfl:     Polyethylene Glycol 3350 (MIRALAX PO), Take  by mouth As Needed., Disp: , Rfl:     triamcinolone (KENALOG) 0.1 % cream, , Disp: , Rfl:     Turmeric Curcumin 500 MG capsule, Take 1 tablet by mouth Daily. (Patient not taking: Reported on 7/24/2023), Disp: , Rfl:     Valerian 450 MG capsule, Take 1 tablet by mouth Daily. (Patient not taking: Reported on 7/24/2023), Disp: , Rfl:     Allergies:   Allergies   Allergen Reactions    Latex Itching    Soy Allergy Other (See Comments)    Ultram [Tramadol] Itching     Severe itching       Social History:   Social History     Socioeconomic History    Marital status:    Tobacco Use    Smoking status: Never     Passive exposure: Never    Smokeless tobacco: Never   Vaping Use    Vaping Use: Never used   Substance and Sexual Activity    Alcohol use: No    Drug use: No    Sexual activity: Defer     Birth control/protection:  "Post-menopausal        Surgical History:   Past Surgical History:   Procedure Laterality Date    BACK SURGERY      BARIATRIC SURGERY      BREAST BIOPSY Left     CHOLECYSTECTOMY N/A 06/07/2018    Procedure: CHOLECYSTECTOMY LAPAROSCOPIC;  Surgeon: Romana Torre MD;  Location: [facility] OR;  Service: General    COLONOSCOPY      DILATATION AND CURETTAGE      ENDOSCOPY      GASTRIC BYPASS  2002    HYSTERECTOMY      ROTATOR CUFF REPAIR      bilateral    TONSILLECTOMY      TOTAL HIP ARTHROPLASTY Left 10/04/2016    Procedure: TOTAL HIP ARTHROPLASTY LEFT;  Surgeon: Arvind Oh MD;  Location: [facility] OR;  Service:     TOTAL HIP ARTHROPLASTY Left     UPPER GASTROINTESTINAL ENDOSCOPY          Medical History:   Past Medical History:   Diagnosis Date    Anxiety     Arthritis     Depression     Kimberly-Danlos disease     GERD (gastroesophageal reflux disease)     GI bleed     History of transfusion     2 UNITS    Migraine     Restless legs     Wears eyeglasses         Objective     Physical Exam:  Vital Signs:   Vitals:    07/24/23 1041   Pulse: 94   Temp: 96.9 °F (36.1 °C)   TempSrc: Infrared   SpO2: 99%   Weight: 65.9 kg (145 lb 3.2 oz)   Height: 165.1 cm (65\")   PainSc:   6   PainLoc: Generalized  Comment: has chronic spinal pain, being treated     Body mass index is 24.16 kg/m².     Physical Exam  Vitals and nursing note reviewed.   Constitutional:       Appearance: Normal appearance. She is normal weight. She is not ill-appearing or diaphoretic.   HENT:      Head: Normocephalic and atraumatic.      Right Ear: External ear normal.      Left Ear: External ear normal.      Nose: Nose normal.      Mouth/Throat:      Mouth: Mucous membranes are moist.      Pharynx: Oropharynx is clear.   Eyes:      Conjunctiva/sclera: Conjunctivae normal.      Pupils: Pupils are equal, round, and reactive to light.   Neck:      Thyroid: No thyromegaly.   Cardiovascular:      Rate and Rhythm: Normal rate and regular rhythm.      Pulses: " Normal pulses.      Heart sounds: Normal heart sounds.   Pulmonary:      Effort: Pulmonary effort is normal.      Breath sounds: Normal breath sounds.   Abdominal:      General: Abdomen is flat. Bowel sounds are normal. There is no distension.      Tenderness: There is no abdominal tenderness.   Musculoskeletal:         General: Normal range of motion.      Cervical back: Normal range of motion and neck supple.   Skin:     General: Skin is warm and dry.   Neurological:      General: No focal deficit present.      Mental Status: She is oriented to person, place, and time.   Psychiatric:         Mood and Affect: Mood normal.       Assessment / Plan      Assessment/Plan:   There are no diagnoses linked to this encounter.     IBS  Bloating  GERD  H/o gastric sleeve   - continue esomeprazole 40mg BID, Creon as prescribed   - pt given GERD diet instructions, advised to avoid GI irritants such as caffeine, carbonation, EtOH, tobacco, chocolate, peppermint, acid-based foods   - continue low FODMAP diet, avoidance of known food allergens   - follow up in clinic in 6mo, or after completion of above studies   - call clinic at any time for questions or new / worsened sx    Follow Up:   Return in about 6 months (around 1/24/2024).    Plan of care reviewed with the patient at the conclusion of today's visit.  Education was provided regarding diagnosis, management, and any prescribed or recommended OTC medications.  Patient verbalized understanding of and agreement with management plan.     NOTE TO PATIENT: The 21st Century Cures Act makes medical notes like these available to patients in the interest of transparency. However, be advised this is a medical document. It is intended as peer to peer communication. It is written in medical language and may contain abbreviations or verbiage that are unfamiliar. It may appear blunt or direct. Medical documents are intended to carry relevant information, facts as evident, and the clinical  opinion of the practitioner.     Time Statement:   Discussed plan of care in detail with patient today. Patient verbally understands and agrees. I have spent 30 minutes reviewing available diagnostics, obtaining history, examining the patient, developing a treatment plan, and educating the patient on disease process and plan of care.     Lizzy Lamas PA-C   [facility]

## 2023-08-04 ENCOUNTER — CLINICAL SUPPORT (OUTPATIENT)
Dept: NEUROLOGY | Facility: CLINIC | Age: 58
End: 2023-08-04
Payer: MEDICARE

## 2023-08-04 VITALS — DIASTOLIC BLOOD PRESSURE: 78 MMHG | SYSTOLIC BLOOD PRESSURE: 116 MMHG

## 2023-08-04 DIAGNOSIS — M54.81 BILATERAL OCCIPITAL NEURALGIA: Primary | ICD-10-CM

## 2023-08-08 ENCOUNTER — HOSPITAL ENCOUNTER (OUTPATIENT)
Dept: MAMMOGRAPHY | Facility: HOSPITAL | Age: 58
Discharge: HOME | End: 2023-08-08
Payer: MEDICARE

## 2023-08-08 DIAGNOSIS — R92.8 ABNORMAL MAMMOGRAM: ICD-10-CM

## 2023-08-08 PROCEDURE — 76098 X-RAY EXAM SURGICAL SPECIMEN: CPT

## 2023-08-08 PROCEDURE — 88305 TISSUE EXAM BY PATHOLOGIST: CPT | Performed by: RADIOLOGY

## 2023-08-08 PROCEDURE — A4648 IMPLANTABLE TISSUE MARKER: HCPCS

## 2023-08-08 PROCEDURE — 0 LIDOCAINE 1 % SOLUTION: Performed by: RADIOLOGY

## 2023-08-08 PROCEDURE — 19081 BX BREAST 1ST LESION STRTCTC: CPT | Performed by: RADIOLOGY

## 2023-08-08 PROCEDURE — 77065 DX MAMMO INCL CAD UNI: CPT | Performed by: RADIOLOGY

## 2023-08-08 RX ORDER — LIDOCAINE HYDROCHLORIDE 10 MG/ML
5 INJECTION, SOLUTION INFILTRATION; PERINEURAL ONCE
Status: COMPLETED | OUTPATIENT
Start: 2023-08-08 | End: 2023-08-08

## 2023-08-08 RX ORDER — LIDOCAINE HYDROCHLORIDE AND EPINEPHRINE 10; 10 MG/ML; UG/ML
10 INJECTION, SOLUTION INFILTRATION; PERINEURAL ONCE
Status: COMPLETED | OUTPATIENT
Start: 2023-08-08 | End: 2023-08-08

## 2023-08-08 RX ADMIN — Medication 4 ML: at 12:08

## 2023-08-08 RX ADMIN — LIDOCAINE HYDROCHLORIDE,EPINEPHRINE BITARTRATE 8 ML: 10; .01 INJECTION, SOLUTION INFILTRATION; PERINEURAL at 12:08

## 2023-08-08 NOTE — PROGRESS NOTES
Alert and orientated. Denies discomfort, no active bleeding, steri-strips not visualized, gauze dressing intact. Cold packs given. Verbalizes and demonstrates understanding of post-care instructions, written copy given.

## 2023-08-09 ENCOUNTER — TELEPHONE (OUTPATIENT)
Dept: MAMMOGRAPHY | Facility: HOSPITAL | Age: 58
End: 2023-08-09
Payer: MEDICARE

## 2023-08-09 LAB
CYTO UR: NORMAL
LAB AP CASE REPORT: NORMAL
LAB AP CLINICAL INFORMATION: NORMAL
LAB AP DIAGNOSIS COMMENT: NORMAL
PATH REPORT.FINAL DX SPEC: NORMAL
PATH REPORT.GROSS SPEC: NORMAL

## 2023-08-09 NOTE — TELEPHONE ENCOUNTER
Patient notified of pathology results and recommendation. Verbalizes understanding. Denies discomfort. Denies signs and symptoms of infection. Questions answered, support given, verbalized understanding.

## 2023-09-25 ENCOUNTER — PRIOR AUTHORIZATION (OUTPATIENT)
Dept: NEUROLOGY | Facility: CLINIC | Age: 58
End: 2023-09-25

## 2023-09-25 NOTE — TELEPHONE ENCOUNTER
HEATH/STEVE  [id number] AUTH REQ  [id number] AUTH REQ  [id number] AUTH REQ    INSURANCE  ANTHEM MEDICARE ADVANTAGE  ID:[id number]      APPROVED  NO AUTH REQ        DONE AUTH ONLINE THROUGH [employer]

## 2023-09-29 ENCOUNTER — CLINICAL SUPPORT (OUTPATIENT)
Dept: NEUROLOGY | Facility: CLINIC | Age: 58
End: 2023-09-29
Payer: MEDICARE

## 2023-09-29 VITALS — SYSTOLIC BLOOD PRESSURE: 124 MMHG | DIASTOLIC BLOOD PRESSURE: 84 MMHG

## 2023-09-29 DIAGNOSIS — M25.512 CHRONIC PAIN OF BOTH SHOULDERS: ICD-10-CM

## 2023-09-29 DIAGNOSIS — G89.29 CHRONIC PAIN OF BOTH SHOULDERS: ICD-10-CM

## 2023-09-29 DIAGNOSIS — M54.81 BILATERAL OCCIPITAL NEURALGIA: Primary | ICD-10-CM

## 2023-09-29 DIAGNOSIS — M25.511 CHRONIC PAIN OF BOTH SHOULDERS: ICD-10-CM

## 2023-09-29 NOTE — PROGRESS NOTES
Procedure note for occipital nerve block-The patient's chart was reviewed.  After obtaining verbal consent the patient was placed in a sitting position with her neck flexed and her chin touching her chest.  Both the left and right occipital areas were prepped with antiseptic solution and injected with bupivacaine 0.5 %. 3 cc of bupivacaine  was injected at the site of the greater occipital nerve on each side and  2 cc was injected in the lesser occipital nerve on each side.  Also trigger point injections were given in bilateral trapezius muscles with 1 cc of bupivacaine each.  Patient tolerated the procedure well.    Of note-patient states that her headaches have been well controlled but she did have a flareup last month when she had constant pain that was radiating to her ear.  She continues to take Topamax 125 mg twice a day and Maxalt as needed.   Continues to be on Wellbutrin for her mood

## 2023-11-27 ENCOUNTER — CLINICAL SUPPORT (OUTPATIENT)
Dept: NEUROLOGY | Facility: CLINIC | Age: 58
End: 2023-11-27
Payer: MEDICARE

## 2023-11-27 DIAGNOSIS — M54.81 BILATERAL OCCIPITAL NEURALGIA: Primary | ICD-10-CM

## 2023-11-27 PROCEDURE — 64450 NJX AA&/STRD OTHER PN/BRANCH: CPT | Performed by: PSYCHIATRY & NEUROLOGY

## 2023-11-27 PROCEDURE — 64405 NJX AA&/STRD GR OCPL NRV: CPT | Performed by: PSYCHIATRY & NEUROLOGY

## 2023-11-27 NOTE — PROGRESS NOTES
Procedure note for occipital nerve block-The patient's chart was reviewed.  After obtaining verbal consent the patient was placed in a sitting position with her neck flexed and her chin touching her chest.  Both the left and right occipital areas were prepped with antiseptic solution and injected with bupivacaine 0.5 %. 3 cc of bupivacaine  was injected at the site of the greater occipital nerve on each side and  2 cc was injected in the lesser occipital nerve on each side.  Also trigger point injections were given in bilateral trapezius muscles with 1 cc of bupivacaine each.  Patient tolerated the procedure well.    Of note-patient states that her headaches have been well controlled.  She had has been having dry eyes recently and would like to cut back on her Topamax therefore I have told her to gradually reduce her dose to 100 mg twice daily.  She also takes Maxalt as needed.

## 2023-12-08 ENCOUNTER — APPOINTMENT (OUTPATIENT)
Dept: CT IMAGING | Facility: HOSPITAL | Age: 58
End: 2023-12-08
Payer: MEDICARE

## 2023-12-08 ENCOUNTER — HOSPITAL ENCOUNTER (EMERGENCY)
Facility: HOSPITAL | Age: 58
Discharge: OTHER INSTITUTION | End: 2023-12-09
Attending: EMERGENCY MEDICINE
Payer: MEDICARE

## 2023-12-08 DIAGNOSIS — K56.1 INTUSSUSCEPTION INTESTINE: ICD-10-CM

## 2023-12-08 DIAGNOSIS — K56.2 VOLVULUS: Primary | ICD-10-CM

## 2023-12-08 LAB
ALBUMIN SERPL-MCNC: 4 G/DL (ref 3.5–5.2)
ALBUMIN/GLOB SERPL: 1.7 G/DL
ALP SERPL-CCNC: 65 U/L (ref 39–117)
ALT SERPL W P-5'-P-CCNC: 23 U/L (ref 1–33)
ANION GAP SERPL CALCULATED.3IONS-SCNC: 10 MMOL/L (ref 5–15)
AST SERPL-CCNC: 25 U/L (ref 1–32)
BACTERIA UR QL AUTO: ABNORMAL /HPF
BASOPHILS # BLD AUTO: 0.07 10*3/MM3 (ref 0–0.2)
BASOPHILS NFR BLD AUTO: 0.6 % (ref 0–1.5)
BILIRUB SERPL-MCNC: 0.2 MG/DL (ref 0–1.2)
BILIRUB UR QL STRIP: NEGATIVE
BUN SERPL-MCNC: 53 MG/DL (ref 6–20)
BUN/CREAT SERPL: 52 (ref 7–25)
CALCIUM SPEC-SCNC: 8.7 MG/DL (ref 8.6–10.5)
CHLORIDE SERPL-SCNC: 99 MMOL/L (ref 98–107)
CLARITY UR: ABNORMAL
CO2 SERPL-SCNC: 21 MMOL/L (ref 22–29)
COLOR UR: YELLOW
CREAT SERPL-MCNC: 1.02 MG/DL (ref 0.57–1)
D-LACTATE SERPL-SCNC: 1.2 MMOL/L (ref 0.5–2)
DEPRECATED RDW RBC AUTO: 50.7 FL (ref 37–54)
EGFRCR SERPLBLD CKD-EPI 2021: 63.5 ML/MIN/1.73
EOSINOPHIL # BLD AUTO: 0.11 10*3/MM3 (ref 0–0.4)
EOSINOPHIL NFR BLD AUTO: 0.9 % (ref 0.3–6.2)
ERYTHROCYTE [DISTWIDTH] IN BLOOD BY AUTOMATED COUNT: 15.2 % (ref 12.3–15.4)
GLOBULIN UR ELPH-MCNC: 2.3 GM/DL
GLUCOSE SERPL-MCNC: 101 MG/DL (ref 65–99)
GLUCOSE UR STRIP-MCNC: NEGATIVE MG/DL
HCT VFR BLD AUTO: 26.6 % (ref 34–46.6)
HGB BLD-MCNC: 8.5 G/DL (ref 12–15.9)
HGB UR QL STRIP.AUTO: NEGATIVE
HOLD SPECIMEN: NORMAL
HOLD SPECIMEN: NORMAL
HYALINE CASTS UR QL AUTO: ABNORMAL /LPF
IMM GRANULOCYTES # BLD AUTO: 0.09 10*3/MM3 (ref 0–0.05)
IMM GRANULOCYTES NFR BLD AUTO: 0.7 % (ref 0–0.5)
KETONES UR QL STRIP: NEGATIVE
LEUKOCYTE ESTERASE UR QL STRIP.AUTO: NEGATIVE
LIPASE SERPL-CCNC: 23 U/L (ref 13–60)
LYMPHOCYTES # BLD AUTO: 1.67 10*3/MM3 (ref 0.7–3.1)
LYMPHOCYTES NFR BLD AUTO: 13.7 % (ref 19.6–45.3)
MAGNESIUM SERPL-MCNC: 2.3 MG/DL (ref 1.6–2.6)
MCH RBC QN AUTO: 29.3 PG (ref 26.6–33)
MCHC RBC AUTO-ENTMCNC: 32 G/DL (ref 31.5–35.7)
MCV RBC AUTO: 91.7 FL (ref 79–97)
MONOCYTES # BLD AUTO: 0.64 10*3/MM3 (ref 0.1–0.9)
MONOCYTES NFR BLD AUTO: 5.3 % (ref 5–12)
NEUTROPHILS NFR BLD AUTO: 78.8 % (ref 42.7–76)
NEUTROPHILS NFR BLD AUTO: 9.6 10*3/MM3 (ref 1.7–7)
NITRITE UR QL STRIP: NEGATIVE
NRBC BLD AUTO-RTO: 0 /100 WBC (ref 0–0.2)
PH UR STRIP.AUTO: 5.5 [PH] (ref 5–8)
PLATELET # BLD AUTO: 379 10*3/MM3 (ref 140–450)
PMV BLD AUTO: 9.4 FL (ref 6–12)
POTASSIUM SERPL-SCNC: 4.7 MMOL/L (ref 3.5–5.2)
PROT SERPL-MCNC: 6.3 G/DL (ref 6–8.5)
PROT UR QL STRIP: NEGATIVE
RBC # BLD AUTO: 2.9 10*6/MM3 (ref 3.77–5.28)
RBC # UR STRIP: ABNORMAL /HPF
REF LAB TEST METHOD: ABNORMAL
SODIUM SERPL-SCNC: 130 MMOL/L (ref 136–145)
SP GR UR STRIP: 1.02 (ref 1–1.03)
SQUAMOUS #/AREA URNS HPF: ABNORMAL /HPF
UROBILINOGEN UR QL STRIP: ABNORMAL
WBC # UR STRIP: ABNORMAL /HPF
WBC NRBC COR # BLD AUTO: 12.18 10*3/MM3 (ref 3.4–10.8)
WHOLE BLOOD HOLD COAG: NORMAL
WHOLE BLOOD HOLD SPECIMEN: NORMAL

## 2023-12-08 PROCEDURE — 25010000002 MORPHINE PER 10 MG: Performed by: EMERGENCY MEDICINE

## 2023-12-08 PROCEDURE — 74177 CT ABD & PELVIS W/CONTRAST: CPT

## 2023-12-08 PROCEDURE — 25010000002 ONDANSETRON PER 1 MG: Performed by: PHYSICIAN ASSISTANT

## 2023-12-08 PROCEDURE — 96375 TX/PRO/DX INJ NEW DRUG ADDON: CPT

## 2023-12-08 PROCEDURE — 25010000002 PROCHLORPERAZINE 10 MG/2ML SOLUTION: Performed by: PHYSICIAN ASSISTANT

## 2023-12-08 PROCEDURE — 96361 HYDRATE IV INFUSION ADD-ON: CPT

## 2023-12-08 PROCEDURE — 99285 EMERGENCY DEPT VISIT HI MDM: CPT

## 2023-12-08 PROCEDURE — 83605 ASSAY OF LACTIC ACID: CPT | Performed by: PHYSICIAN ASSISTANT

## 2023-12-08 PROCEDURE — 85025 COMPLETE CBC W/AUTO DIFF WBC: CPT | Performed by: PHYSICIAN ASSISTANT

## 2023-12-08 PROCEDURE — 80053 COMPREHEN METABOLIC PANEL: CPT | Performed by: PHYSICIAN ASSISTANT

## 2023-12-08 PROCEDURE — 83690 ASSAY OF LIPASE: CPT | Performed by: PHYSICIAN ASSISTANT

## 2023-12-08 PROCEDURE — 25810000003 SODIUM CHLORIDE 0.9 % SOLUTION: Performed by: PHYSICIAN ASSISTANT

## 2023-12-08 PROCEDURE — 25510000001 IOPAMIDOL 61 % SOLUTION: Performed by: EMERGENCY MEDICINE

## 2023-12-08 PROCEDURE — 96374 THER/PROPH/DIAG INJ IV PUSH: CPT

## 2023-12-08 PROCEDURE — 81001 URINALYSIS AUTO W/SCOPE: CPT | Performed by: PHYSICIAN ASSISTANT

## 2023-12-08 PROCEDURE — 0 DIATRIZOATE MEGLUMINE & SODIUM PER 1 ML: Performed by: PHYSICIAN ASSISTANT

## 2023-12-08 PROCEDURE — 25010000002 DIPHENHYDRAMINE PER 50 MG: Performed by: PHYSICIAN ASSISTANT

## 2023-12-08 PROCEDURE — 83735 ASSAY OF MAGNESIUM: CPT | Performed by: PHYSICIAN ASSISTANT

## 2023-12-08 RX ORDER — MORPHINE SULFATE 2 MG/ML
2 INJECTION, SOLUTION INTRAMUSCULAR; INTRAVENOUS ONCE
Status: COMPLETED | OUTPATIENT
Start: 2023-12-08 | End: 2023-12-08

## 2023-12-08 RX ORDER — DIPHENHYDRAMINE HYDROCHLORIDE 50 MG/ML
12.5 INJECTION INTRAMUSCULAR; INTRAVENOUS ONCE
Status: COMPLETED | OUTPATIENT
Start: 2023-12-08 | End: 2023-12-08

## 2023-12-08 RX ORDER — SODIUM CHLORIDE 0.9 % (FLUSH) 0.9 %
10 SYRINGE (ML) INJECTION AS NEEDED
Status: DISCONTINUED | OUTPATIENT
Start: 2023-12-08 | End: 2023-12-09 | Stop reason: HOSPADM

## 2023-12-08 RX ORDER — PROCHLORPERAZINE EDISYLATE 5 MG/ML
5 INJECTION INTRAMUSCULAR; INTRAVENOUS ONCE
Status: COMPLETED | OUTPATIENT
Start: 2023-12-08 | End: 2023-12-08

## 2023-12-08 RX ORDER — ONDANSETRON 2 MG/ML
4 INJECTION INTRAMUSCULAR; INTRAVENOUS ONCE
Status: COMPLETED | OUTPATIENT
Start: 2023-12-08 | End: 2023-12-08

## 2023-12-08 RX ADMIN — DIPHENHYDRAMINE HYDROCHLORIDE 12.5 MG: 50 INJECTION INTRAMUSCULAR; INTRAVENOUS at 23:27

## 2023-12-08 RX ADMIN — DIATRIZOATE MEGLUMINE AND DIATRIZOATE SODIUM 15 ML: 660; 100 LIQUID ORAL; RECTAL at 20:19

## 2023-12-08 RX ADMIN — MORPHINE SULFATE 2 MG: 2 INJECTION, SOLUTION INTRAMUSCULAR; INTRAVENOUS at 22:54

## 2023-12-08 RX ADMIN — ONDANSETRON 4 MG: 2 INJECTION INTRAMUSCULAR; INTRAVENOUS at 20:20

## 2023-12-08 RX ADMIN — IOPAMIDOL 99 ML: 612 INJECTION, SOLUTION INTRAVENOUS at 21:33

## 2023-12-08 RX ADMIN — PROCHLORPERAZINE EDISYLATE 5 MG: 5 INJECTION, SOLUTION INTRAMUSCULAR; INTRAVENOUS at 23:27

## 2023-12-08 RX ADMIN — SODIUM CHLORIDE 1000 ML: 9 INJECTION, SOLUTION INTRAVENOUS at 22:52

## 2023-12-09 VITALS
SYSTOLIC BLOOD PRESSURE: 116 MMHG | OXYGEN SATURATION: 98 % | HEIGHT: 65 IN | WEIGHT: 130 LBS | TEMPERATURE: 98.3 F | RESPIRATION RATE: 14 BRPM | HEART RATE: 105 BPM | DIASTOLIC BLOOD PRESSURE: 80 MMHG | BODY MASS INDEX: 21.66 KG/M2

## 2023-12-09 LAB — HOLD SPECIMEN: NORMAL

## 2023-12-09 NOTE — ED PROVIDER NOTES
Subjective  History of Present Illness:    Chief Complaint: Mid abdominal pain  History of Present Illness: 58-year-old female presents with abdominal pain, she has had abdominal pain intermittently for several days, she states she took steroids for bursitis a few weeks ago, and this may have flared up her abdominal pain, she was seen at [facility] several days ago, had labs and a CT scan that she reports were unremarkable.  She has a significant past medical history for gastric bypass, she is also had cholecystectomy, she states the pain is in the middle of her abdomen, and is worse with eating, she initially had diarrhea but was given medications while at [facility] and the diarrhea has improved.  The pain is intermittent, she also saw her primary care physician who put her on Bentyl.  She also has associated nausea and some vomiting.  Onset: Gradual onset  Duration: Intermittent pain for several days  Exacerbating / Alleviating factors: Pain is worse with eating and to touch  Associated symptoms: Nausea and vomiting      Nurses Notes reviewed and agree, including vitals, allergies, social history and prior medical history.     REVIEW OF SYSTEMS: All systems reviewed and not pertinent unless noted.    Review of Systems   Gastrointestinal:  Positive for abdominal pain, nausea and vomiting.   All other systems reviewed and are negative.      Past Medical History:   Diagnosis Date    Anxiety     Arthritis     Depression     Kimberly-Danlos disease     GERD (gastroesophageal reflux disease)     GI bleed     History of transfusion     2 UNITS    Migraine     Restless legs     Wears eyeglasses        Allergies:    Latex, Milk (cow), Pollen extract, Savella [milnacipran], Soy allergy, Wheat bran, and Ultram [tramadol]      Past Surgical History:   Procedure Laterality Date    BACK SURGERY      BARIATRIC SURGERY      BREAST BIOPSY Left     CHOLECYSTECTOMY N/A 06/07/2018    Procedure: CHOLECYSTECTOMY LAPAROSCOPIC;  Surgeon:  "Romana Torre MD;  Location:  [facility];  Service: General    COLONOSCOPY      DILATATION AND CURETTAGE      ENDOSCOPY      GASTRIC BYPASS  2002    HYSTERECTOMY      ROTATOR CUFF REPAIR      bilateral    TONSILLECTOMY      TOTAL HIP ARTHROPLASTY Left 10/04/2016    Procedure: TOTAL HIP ARTHROPLASTY LEFT;  Surgeon: Arvind Oh MD;  Location:  [facility];  Service:     TOTAL HIP ARTHROPLASTY Left     UPPER GASTROINTESTINAL ENDOSCOPY           Social History     Socioeconomic History    Marital status:    Tobacco Use    Smoking status: Never     Passive exposure: Never    Smokeless tobacco: Never   Vaping Use    Vaping Use: Never used   Substance and Sexual Activity    Alcohol use: No    Drug use: No    Sexual activity: Defer     Birth control/protection: Post-menopausal         Family History   Problem Relation Age of Onset    Colon polyps Mother     Dementia Mother     Lung cancer Mother     Heart failure Father     Breast cancer Maternal Grandmother 50    Breast cancer Maternal Aunt     Esophageal cancer Maternal Grandfather     Ovarian cancer Neg Hx     Colon cancer Neg Hx        Objective  Physical Exam:  /80   Pulse 105   Temp 98.3 °F (36.8 °C) (Oral)   Resp 14   Ht 165.1 cm (65\")   Wt 59 kg (130 lb)   SpO2 98%   BMI 21.63 kg/m²      Physical Exam  Vitals and nursing note reviewed.   Constitutional:       Appearance: She is well-developed.   Cardiovascular:      Rate and Rhythm: Normal rate and regular rhythm.   Pulmonary:      Effort: Pulmonary effort is normal.      Breath sounds: Normal breath sounds.   Abdominal:      General: Bowel sounds are normal.      Palpations: Abdomen is soft.      Tenderness: There is generalized abdominal tenderness and tenderness in the epigastric area.   Musculoskeletal:         General: Normal range of motion.      Cervical back: Normal range of motion and neck supple.   Skin:     General: Skin is warm and dry.   Neurological:      Mental Status: She " is alert and oriented to person, place, and time.      Deep Tendon Reflexes: Reflexes are normal and symmetric.           Critical Care    Performed by: Carlos Fried Jr., RUBENS  Authorized by: Espinoza Gordon MD    Critical care provider statement:     Critical care time (minutes):  30    Critical care time was exclusive of:  Separately billable procedures and treating other patients and teaching time    Critical care was necessary to treat or prevent imminent or life-threatening deterioration of the following conditions: Acute small bowel to small bowel intussusception/volvulus, requiring transfer to a tertiary center with plan for surgical intervention.    Critical care was time spent personally by me on the following activities:  Blood draw for specimens, development of treatment plan with patient or surrogate, discussions with consultants, discussions with primary provider, evaluation of patient's response to treatment, examination of patient, interpretation of cardiac output measurements, obtaining history from patient or surrogate, ordering and performing treatments and interventions, ordering and review of laboratory studies, ordering and review of radiographic studies, pulse oximetry, re-evaluation of patient's condition and review of old charts    Care discussed with: admitting provider        ED Course:  Discussed the case with  MDs Dr. Hussein, patient accepted for transfer to     ED Course as of 12/09/23 0314   Fri Dec 08, 2023   2325 Discussed the case with Dr. Garcia, he recommended calling bariatric surgery due to the history of gastric bypass [CS]   2335 Discussed the case with Dr. Corwin Falk PA, she will discussed the case with Dr. Steiner, back [CS]   2346 Rediscussed with Mia Moy, they recommend calling Dr. Lennon with  [CS]   Sat Dec 09, 2023   0007 Call placed through [facility] to Dr. Lennon awaiting callback [CS]      ED Course User Index  [CS] Carlos Fried Jr.,  RUBENS       Lab Results (last 24 hours)       Procedure Component Value Units Date/Time    CBC & Differential [[id number]]  (Abnormal) Collected: 12/08/23 2010    Specimen: Blood Updated: 12/08/23 2033    Narrative:      The following orders were created for panel order CBC & Differential.  Procedure                               Abnormality         Status                     ---------                               -----------         ------                     CBC Auto Differential[[id number]]        Abnormal            Final result                 Please view results for these tests on the individual orders.    Comprehensive Metabolic Panel [[id number]]  (Abnormal) Collected: 12/08/23 2010    Specimen: Blood Updated: 12/08/23 2057     Glucose 101 mg/dL      BUN 53 mg/dL      Creatinine 1.02 mg/dL      Sodium 130 mmol/L      Potassium 4.7 mmol/L      Comment: Specimen hemolyzed.  Result may be falsely elevated.        Chloride 99 mmol/L      CO2 21.0 mmol/L      Calcium 8.7 mg/dL      Total Protein 6.3 g/dL      Albumin 4.0 g/dL      ALT (SGPT) 23 U/L      Comment: Specimen hemolyzed.  Result may  be falsely elevated.        AST (SGOT) 25 U/L      Alkaline Phosphatase 65 U/L      Total Bilirubin 0.2 mg/dL      Globulin 2.3 gm/dL      Comment: Calculated Result        A/G Ratio 1.7 g/dL      BUN/Creatinine Ratio 52.0     Anion Gap 10.0 mmol/L      eGFR 63.5 mL/min/1.73     Narrative:      GFR Normal >60  Chronic Kidney Disease <60  Kidney Failure <15      Lipase [[id number]]  (Normal) Collected: 12/08/23 2010    Specimen: Blood Updated: 12/08/23 2052     Lipase 23 U/L     Lactic Acid, Plasma [[id number]]  (Normal) Collected: 12/08/23 2010    Specimen: Blood Updated: 12/08/23 2050     Lactate 1.2 mmol/L      Comment: Falsely depressed results may occur on samples drawn from patients receiving N-Acetylcysteine (NAC) or Metamizole.       Magnesium [[id number]]  (Normal) Collected: 12/08/23 2010    Specimen: Blood Updated:  12/08/23 2057     Magnesium 2.3 mg/dL     CBC Auto Differential [[id number]]  (Abnormal) Collected: 12/08/23 2010    Specimen: Blood Updated: 12/08/23 2033     WBC 12.18 10*3/mm3      RBC 2.90 10*6/mm3      Hemoglobin 8.5 g/dL      Hematocrit 26.6 %      MCV 91.7 fL      MCH 29.3 pg      MCHC 32.0 g/dL      RDW 15.2 %      RDW-SD 50.7 fl      MPV 9.4 fL      Platelets 379 10*3/mm3      Neutrophil % 78.8 %      Lymphocyte % 13.7 %      Monocyte % 5.3 %      Eosinophil % 0.9 %      Basophil % 0.6 %      Immature Grans % 0.7 %      Neutrophils, Absolute 9.60 10*3/mm3      Lymphocytes, Absolute 1.67 10*3/mm3      Monocytes, Absolute 0.64 10*3/mm3      Eosinophils, Absolute 0.11 10*3/mm3      Basophils, Absolute 0.07 10*3/mm3      Immature Grans, Absolute 0.09 10*3/mm3      nRBC 0.0 /100 WBC     Urinalysis With Microscopic If Indicated (No Culture) - Urine, Clean Catch [[id number]]  (Abnormal) Collected: 12/08/23 2019    Specimen: Urine, Clean Catch Updated: 12/08/23 2102     Color, UA Yellow     Appearance, UA Cloudy     pH, UA 5.5     Specific Gravity, UA 1.022     Glucose, UA Negative     Ketones, UA Negative     Bilirubin, UA Negative     Blood, UA Negative     Protein, UA Negative     Leuk Esterase, UA Negative     Nitrite, UA Negative     Urobilinogen, UA 0.2 E.U./dL    Urinalysis, Microscopic Only - Urine, Clean Catch [[id number]]  (Abnormal) Collected: 12/08/23 2019    Specimen: Urine, Clean Catch Updated: 12/08/23 2102     RBC, UA 3-5 /HPF      WBC, UA 0-2 /HPF      Bacteria, UA None Seen /HPF      Squamous Epithelial Cells, UA 0-2 /HPF      Hyaline Casts, UA 7-12 /LPF      Methodology Manual Light Microscopy             CT Abdomen Pelvis With Contrast    Result Date: 12/8/2023  CT ABDOMEN PELVIS W CONTRAST Date of Exam: 12/8/2023 9:29 PM EST Indication: previous gastric bypass mid abdominal pain. Comparison: None available. Technique: Axial CT images were obtained of the abdomen and pelvis following the uneventful  intravenous administration of 99 mL Isovue-300. Reconstructed coronal and sagittal images were also obtained. Automated exposure control and iterative construction methods were used. Findings: Lung bases are without consolidation. Heart size normal. Negative for pericardial or pleural effusion. The liver and spleen are normal in size and contour. Normal adrenal glands. Gallbladder absent. Mild biliary dilatation which may relate to postcholecystectomy state. Pancreas is without findings of pancreatitis. There is borderline dilatation of the main pancreatic duct which is at the upper limits of normal measuring 3 mm in diameter. Normal pancreatic parenchymal enhancement. Kidneys enhance symmetrically. Cyst at the right mid kidney measuring 12 mm. No hydronephrosis or hydroureter. Urinary bladder is thin-walled. Streak artifact from left hip prosthesis partially limits pelvic assessment. Uterus not well visualized and suspected surgically absent. Negative for adnexal mass. Negative for pneumoperitoneum or pneumatosis intestinalis. Large amount of formed stool within the ascending and transverse colon which suggest constipation and delayed transit. Negative for appendicitis. Postsurgical changes of gastric bypass. Within the left midabdomen there is a small bowel-small bowel intussusception (2/78, 900/38). This does not result in bowel obstruction. No drainable fluid collection or abscess in the abdomen or pelvis. There is a twisted appearance of the central mesentery with engorgement of several distal mesenteric draining veins which are severely compressed and attenuated centrally near mesenteric root (2/65-82). There is edema within the central mesentery surrounding these vessels. The portal vein, splenic vein, and superior mesenteric vein are patent. The celiac axis and super mesenteric artery are patent. Distal aortic branch vasculature patent. Disc narrowing the lower lumbar spine at L5-S1. No acute fracture.  Moderate right hip  osteoarthritis.     Impression: Impression: 1. Twisted appearance of the central mesentery with engorgement of several distal mesenteric veins which are severely compressed and attenuated centrally near the mesenteric root. Edema within central mesentery secondary to venous congestion. 2. Small bowel-small bowel intussusception in the left midabdomen, these are typically transient and incidental, however recommend attention on follow-up to confirm resolution. No bowel obstruction. 3. Large amount of formed stool in ascending and transverse colon compatible with constipation and delayed transit. 4. Prior gastric bypass, cholecystectomy, and hysterectomy. 5. No fluid collection or abscess in the abdomen or pelvis. Electronically Signed: Beltran Tomlin MD  12/8/2023 10:27 PM EST  Workstation ID: [id number]        Medical Decision Making  Patient Presentation 58-year-old female presented with abdominal pain, pain on the left side on palpation, with mild guarding, vital signs stable no acute distress    DDX. Differential diagnosis would include colitis, diverticulitis, or bowel disease, or bowel syndrome, nephrolithiasis, pyelonephritis, bowel obstruction, volvulus, intussusception       Data Review/ Non ED Records /Analysis/Ordering unique tests. Review of previous visits, prior labs, prior imaging, available notes from prior evaluations or visits with specialists, medication list, allergies, past medical history, past surgical history CBC, CMP, lactic, lipase, magnesium, urinalysis were performed        Independent Review Studies reviewed and independently interpreted all labs discussed with the patient and family at the bedside    Intervention/Re-evaluation intervention included multiple IV fluid boluses, pain medicine, and antiemetics    Independent Clinician discussed with my supervising physician Dr. Gordon, discussed with the on-call general surgeon Dr. Garcia, discussed with bariatric surgeon  Dr. Olivia, discussed with transfer physician at  Dr. Hussein    Risk Stratification tools/clinical decision rules patient presented with history of gastric bypass, left-sided abdominal pain nausea vomiting and food intolerance, significant concern for obstruction, anastomotic leak, infection, volvulus, patient was found to have intussusception, volvulus, significant risk for ischemia, endorgan damage, disability or death, she was transferred to tertiary center with plan for surgical intervention    Shared Decision Making discussed the findings and the plan with the patient and family and they agree with the transfer to tertiary center    Disposition patient stable for transfer    Problems Addressed:  Intussusception intestine: complicated acute illness or injury  Volvulus: complicated acute illness or injury    Amount and/or Complexity of Data Reviewed  Labs: ordered.  Radiology: ordered.    Risk  Prescription drug management.          Final diagnoses:   Volvulus   Intussusception intestine          Carlos Fried Jr., PA-C  12/09/23 0314

## 2024-02-16 RX ORDER — TOPIRAMATE 100 MG/1
100 TABLET, FILM COATED ORAL 2 TIMES DAILY
Qty: 60 TABLET | Refills: 2 | Status: SHIPPED | OUTPATIENT
Start: 2024-02-16

## 2024-02-16 RX ORDER — TOPIRAMATE 25 MG/1
TABLET ORAL
Qty: 60 TABLET | Refills: 2 | Status: SHIPPED | OUTPATIENT
Start: 2024-02-16

## 2024-02-28 ENCOUNTER — TELEPHONE (OUTPATIENT)
Dept: NEUROLOGY | Facility: CLINIC | Age: 59
End: 2024-02-28
Payer: MEDICARE

## 2024-02-28 NOTE — TELEPHONE ENCOUNTER
Provider: DR RUIZ    Caller: PATIENT    Relationship to Patient: SELF    Phone Number: [phone]    Reason for Call: PATIENT STATES SHE HAD SURGERY DECEMBER 9TH ON HER SMALL INTESTINE. WANTED TO LET PROVIDER KNOW THAT PER HER SURGEON'S RECOMMENDATION SHE HAS BEEN WEANING THE TOPIRAMATE DOWN TO AVOID CONSTIPATION. AT THIS POINT SHE IS TAKING 75MG TWICE DAILY AND WILL BEING GOING DOWN TO 50MG TWICE DAILY SOON. PLEASE REVIEW, THANK YOU.

## 2024-03-01 NOTE — TELEPHONE ENCOUNTER
"Attempted to call Yumiko and relay providers response. Left voicemail.    Relay  \"Please tell the patient that she should stick to a dose of 50 mg twice daily because taking away the Topamax completely could trigger her headaches.\"      "

## 2024-05-14 NOTE — TELEPHONE ENCOUNTER
Rx Refill Note  Requested Prescriptions     Pending Prescriptions Disp Refills    topiramate (TOPAMAX) 100 MG tablet [Pharmacy Med Name: TOPIRAMATE 100 MG TABLET] 60 tablet 2     Sig: TAKE 1 TABLET BY MOUTH TWICE A DAY      Last filled:2/16/24 2 refill  Last office visit with prescribing clinician: 1/23/2023      Next office visit with prescribing clinician: Visit date not found     GEORGES VAZQUEZ  05/14/24, 09:08 EDT

## 2024-05-17 RX ORDER — TOPIRAMATE 100 MG/1
100 TABLET, FILM COATED ORAL 2 TIMES DAILY
Qty: 60 TABLET | Refills: 2 | OUTPATIENT
Start: 2024-05-17

## 2024-07-01 ENCOUNTER — TRANSCRIBE ORDERS (OUTPATIENT)
Dept: ADMINISTRATIVE | Facility: HOSPITAL | Age: 59
End: 2024-07-01
Payer: MEDICARE

## 2024-07-01 DIAGNOSIS — R92.8 ABNORMAL MAMMOGRAM: Primary | ICD-10-CM

## 2024-07-05 ENCOUNTER — TELEPHONE (OUTPATIENT)
Dept: NEUROLOGY | Facility: CLINIC | Age: 59
End: 2024-07-05
Payer: MEDICARE

## 2024-07-05 NOTE — TELEPHONE ENCOUNTER
Patient called to schedule ONB.  Scheduled for 8/16/24 @10:30.  Let patient and Lauren know that appt may need to be rescheduled due to possible authorization needed.  Patient was in good understanding.  Sent message to Lauren to check.

## 2024-07-18 ENCOUNTER — HOSPITAL ENCOUNTER (OUTPATIENT)
Dept: MAMMOGRAPHY | Facility: HOSPITAL | Age: 59
Discharge: HOME | End: 2024-07-18
Admitting: INTERNAL MEDICINE
Payer: MEDICARE

## 2024-07-18 DIAGNOSIS — R92.8 ABNORMAL MAMMOGRAM: ICD-10-CM

## 2024-07-18 PROCEDURE — 77063 BREAST TOMOSYNTHESIS BI: CPT

## 2024-07-18 PROCEDURE — 77067 SCR MAMMO BI INCL CAD: CPT

## 2024-08-16 ENCOUNTER — CLINICAL SUPPORT (OUTPATIENT)
Dept: NEUROLOGY | Facility: CLINIC | Age: 59
End: 2024-08-16
Payer: MEDICARE

## 2024-08-16 VITALS — DIASTOLIC BLOOD PRESSURE: 92 MMHG | SYSTOLIC BLOOD PRESSURE: 148 MMHG

## 2024-08-16 DIAGNOSIS — M54.81 BILATERAL OCCIPITAL NEURALGIA: Primary | ICD-10-CM

## 2024-08-16 RX ORDER — RIZATRIPTAN BENZOATE 10 MG/1
TABLET ORAL
Qty: 8 TABLET | Refills: 3 | Status: SHIPPED | OUTPATIENT
Start: 2024-08-16

## 2024-08-16 NOTE — PROGRESS NOTES
Procedure note for occipital nerve block-The patient's chart was reviewed.  After obtaining verbal consent the patient was placed in a sitting position with her neck flexed and her chin touching her chest.  Both the left and right occipital areas were prepped with antiseptic solution and injected with bupivacaine 0.5 %. 3 cc of bupivacaine  was injected at the site of the greater occipital nerve on each side and  2 cc was injected in the lesser occipital nerve on each side.  Also trigger point injections were given in bilateral trapezius muscles with 1 cc of bupivacaine each.  Patient tolerated the procedure well.    Of note-patient last saw me in November.  She could not follow-up any sooner as she had bowel surgery for volvulus (fortunately did not need a resection).  Will be having shoulder surgery soon.  She reduced her dose of Topamax and is currently taking a dose of 50 mg in the morning and 100 mg at night.  Has about 1 headache a week for which she takes Maxalt.  Her blood pressure has also been running high and it was attributed to her Wellbutrin.

## 2024-09-03 ENCOUNTER — HOSPITAL ENCOUNTER (OUTPATIENT)
Dept: CARDIOLOGY | Facility: HOSPITAL | Age: 59
Discharge: HOME | End: 2024-09-03
Payer: MEDICARE

## 2024-09-03 ENCOUNTER — TRANSCRIBE ORDERS (OUTPATIENT)
Dept: GENERAL RADIOLOGY | Facility: HOSPITAL | Age: 59
End: 2024-09-03
Payer: MEDICARE

## 2024-09-03 ENCOUNTER — TRANSCRIBE ORDERS (OUTPATIENT)
Dept: LAB | Facility: HOSPITAL | Age: 59
End: 2024-09-03
Payer: MEDICARE

## 2024-09-03 ENCOUNTER — TRANSCRIBE ORDERS (OUTPATIENT)
Dept: CARDIOLOGY | Facility: HOSPITAL | Age: 59
End: 2024-09-03
Payer: MEDICARE

## 2024-09-03 ENCOUNTER — HOSPITAL ENCOUNTER (OUTPATIENT)
Dept: GENERAL RADIOLOGY | Facility: HOSPITAL | Age: 59
Discharge: HOME | End: 2024-09-03
Payer: MEDICARE

## 2024-09-03 ENCOUNTER — LAB (OUTPATIENT)
Dept: LAB | Facility: HOSPITAL | Age: 59
End: 2024-09-03
Payer: MEDICARE

## 2024-09-03 DIAGNOSIS — Z01.818 OTHER SPECIFIED PRE-OPERATIVE EXAMINATION: ICD-10-CM

## 2024-09-03 DIAGNOSIS — R73.09 HEMOGLOBIN A1C ABOVE REFERENCE RANGE: ICD-10-CM

## 2024-09-03 DIAGNOSIS — Z01.818 PREOP EXAMINATION: Primary | ICD-10-CM

## 2024-09-03 DIAGNOSIS — Z01.818 PREOP EXAMINATION: ICD-10-CM

## 2024-09-03 DIAGNOSIS — Z87.898 PERSONAL HISTORY OF UNSPECIFIED DISEASE: Primary | ICD-10-CM

## 2024-09-03 DIAGNOSIS — Z87.898 PERSONAL HISTORY OF UNSPECIFIED DISEASE: ICD-10-CM

## 2024-09-03 LAB
ANION GAP SERPL CALCULATED.3IONS-SCNC: 12.7 MMOL/L (ref 5–15)
BUN SERPL-MCNC: 12 MG/DL (ref 6–20)
BUN/CREAT SERPL: 11.5 (ref 7–25)
CALCIUM SPEC-SCNC: 8.9 MG/DL (ref 8.6–10.5)
CHLORIDE SERPL-SCNC: 105 MMOL/L (ref 98–107)
CO2 SERPL-SCNC: 21.3 MMOL/L (ref 22–29)
CREAT SERPL-MCNC: 1.04 MG/DL (ref 0.57–1)
DEPRECATED RDW RBC AUTO: 44.5 FL (ref 37–54)
EGFRCR SERPLBLD CKD-EPI 2021: 62 ML/MIN/1.73
ERYTHROCYTE [DISTWIDTH] IN BLOOD BY AUTOMATED COUNT: 15.8 % (ref 12.3–15.4)
GLUCOSE SERPL-MCNC: 104 MG/DL (ref 65–99)
HBA1C MFR BLD: 5.7 % (ref 4.8–5.6)
HCT VFR BLD AUTO: 28.8 % (ref 34–46.6)
HGB BLD-MCNC: 8.9 G/DL (ref 12–15.9)
MCH RBC QN AUTO: 24 PG (ref 26.6–33)
MCHC RBC AUTO-ENTMCNC: 30.9 G/DL (ref 31.5–35.7)
MCV RBC AUTO: 77.6 FL (ref 79–97)
PLATELET # BLD AUTO: 451 10*3/MM3 (ref 140–450)
PMV BLD AUTO: 8.5 FL (ref 6–12)
POTASSIUM SERPL-SCNC: 4.4 MMOL/L (ref 3.5–5.2)
RBC # BLD AUTO: 3.71 10*6/MM3 (ref 3.77–5.28)
SODIUM SERPL-SCNC: 139 MMOL/L (ref 136–145)
WBC NRBC COR # BLD AUTO: 5.44 10*3/MM3 (ref 3.4–10.8)

## 2024-09-03 PROCEDURE — 71046 X-RAY EXAM CHEST 2 VIEWS: CPT

## 2024-09-03 PROCEDURE — 93005 ELECTROCARDIOGRAM TRACING: CPT | Performed by: ORTHOPAEDIC SURGERY

## 2024-09-03 PROCEDURE — 36415 COLL VENOUS BLD VENIPUNCTURE: CPT

## 2024-09-03 PROCEDURE — 85027 COMPLETE CBC AUTOMATED: CPT

## 2024-09-03 PROCEDURE — 80048 BASIC METABOLIC PNL TOTAL CA: CPT

## 2024-09-03 PROCEDURE — 83036 HEMOGLOBIN GLYCOSYLATED A1C: CPT

## 2024-09-05 LAB
QT INTERVAL: 372 MS
QTC INTERVAL: 439 MS

## 2024-10-09 ENCOUNTER — OFFICE VISIT (OUTPATIENT)
Dept: GASTROENTEROLOGY | Facility: CLINIC | Age: 59
End: 2024-10-09
Payer: MEDICARE

## 2024-10-09 VITALS
BODY MASS INDEX: 23.66 KG/M2 | HEART RATE: 80 BPM | WEIGHT: 142 LBS | OXYGEN SATURATION: 97 % | SYSTOLIC BLOOD PRESSURE: 134 MMHG | HEIGHT: 65 IN | TEMPERATURE: 97.7 F | DIASTOLIC BLOOD PRESSURE: 82 MMHG

## 2024-10-09 DIAGNOSIS — K21.9 GASTROESOPHAGEAL REFLUX DISEASE, UNSPECIFIED WHETHER ESOPHAGITIS PRESENT: ICD-10-CM

## 2024-10-09 DIAGNOSIS — K58.9 IRRITABLE BOWEL SYNDROME, UNSPECIFIED TYPE: ICD-10-CM

## 2024-10-09 RX ORDER — MONTELUKAST SODIUM 10 MG/1
TABLET ORAL
COMMUNITY
Start: 2024-09-16

## 2024-10-09 RX ORDER — ESOMEPRAZOLE MAGNESIUM 40 MG/1
40 CAPSULE, DELAYED RELEASE ORAL 2 TIMES DAILY
Qty: 60 CAPSULE | Refills: 11 | Status: SHIPPED | OUTPATIENT
Start: 2024-10-09

## 2024-10-09 RX ORDER — ESOMEPRAZOLE MAGNESIUM 40 MG/1
1 CAPSULE, DELAYED RELEASE ORAL 2 TIMES DAILY
COMMUNITY
Start: 2024-08-30 | End: 2024-10-09

## 2024-10-09 RX ORDER — LOSARTAN POTASSIUM 50 MG/1
TABLET ORAL
COMMUNITY
Start: 2024-08-05

## 2024-10-09 RX ORDER — CYCLOBENZAPRINE HCL 10 MG
TABLET ORAL
COMMUNITY
Start: 2024-03-05

## 2024-10-09 NOTE — PROGRESS NOTES
Follow Up      Patient Name: Yumiko Oswald  : 1965   MRN: [id number]     Chief Complaint:    Chief Complaint   Patient presents with    Follow-up     IBS       History of Present Illness: Yumiko Oswald is a 59 y.o. female who is here today for follow up on GERD, IBS.    Pt was treated via endoscopy and ex lap 2023 at  for intussuception, thought to be secondary to constipation. She has been doing well overall since that time. She discontinued use of Creon due to cost but has maintained minimal bloating with keto diet. She underwent total shoulder replacement 24 and has maintained good bowel regimen since that time.     GERD continues to be well controlled with esomeprazole 40mg BID. She is requesting a refill for such.     Patient denies associated fever, chills, abdominal pain, indigestion, nausea, vomiting, diarrhea, constipation, hematemesis, dysphagia, hematochezia, melena, weight loss or gain, dysuria, jaundice or bruising.    EGD / CSY  with Dr. Martinez. Normal upper / middle third of esophagus. Small hiadal hernia. Z-line 39cm from incisors. LA Grade A esophagitis. Evidence of gastric bypass, gastric pouch about 3cm. Normal jejunum, anastomosis patent with staple present. Bx negative for celiac disease, H Pylori, EoE. Good bowel prep conditions. Normal terminal ileum. Nonbleeding internal hemorrhoids. Diverticulosis without acute diverticulitis in sigmoid and descending colon. Normal appearing colonic mucosa otherwise. Bx negative for microscopic colitis.     Subjective      Review of Systems:   Review of Systems   Constitutional:  Negative for appetite change, chills, diaphoresis, fatigue, fever, unexpected weight gain and unexpected weight loss.   HENT:  Negative for drooling, facial swelling, mouth sores, rhinorrhea, sore throat, tinnitus, trouble swallowing and voice change.    Eyes: Negative.    Respiratory:  Negative for cough, chest tightness and shortness of breath.     Cardiovascular:  Negative for chest pain.   Gastrointestinal:  Negative for abdominal pain, blood in stool, constipation, diarrhea, nausea, vomiting, GERD and indigestion.   Genitourinary:  Negative for dysuria, flank pain, hematuria and pelvic pain.   Musculoskeletal:  Negative for arthralgias and myalgias.   Skin:  Negative for color change, pallor and rash.   Neurological:  Negative for dizziness, tremors, syncope, weakness and numbness.   Psychiatric/Behavioral:  Negative for hallucinations and sleep disturbance. The patient is not nervous/anxious.    All other systems reviewed and are negative.      Medications:     Current Outpatient Medications:     ALPRAZolam XR (XANAX XR) 0.5 MG 24 hr tablet, Take 1 tablet by mouth As Needed., Disp: , Rfl:     amLODIPine (NORVASC) 5 MG tablet, , Disp: , Rfl:     Biotin 5 MG capsule, Take 2,000 capsules by mouth Daily. Takes 2 tablets daily, Disp: , Rfl:     buPROPion XL (WELLBUTRIN XL) 300 MG 24 hr tablet, Take 1 tablet by mouth Daily., Disp: , Rfl:     cyanocobalamin 1000 MCG/ML injection, 1 mL Every 28 (Twenty-Eight) Days., Disp: , Rfl:     diphenhydrAMINE (BENADRYL) 25 mg capsule, Take 1 capsule by mouth At Night As Needed for Itching., Disp: , Rfl:     esomeprazole (nexIUM) 40 MG capsule, Take 1 capsule by mouth 2 (Two) Times a Day., Disp: 60 capsule, Rfl: 5    estradiol (ESTRACE) 0.1 MG/GM vaginal cream, , Disp: , Rfl:     hydroxychloroquine (PLAQUENIL) 200 MG tablet, , Disp: , Rfl:     lisinopril (PRINIVIL,ZESTRIL) 10 MG tablet, , Disp: , Rfl:     Magnesium 500 MG capsule, Take 1 tablet by mouth Daily., Disp: , Rfl:     meloxicam (MOBIC) 15 MG tablet, meloxicam 15 mg tablet  Take 1 tablet every day by oral route., Disp: , Rfl:     Multiple Vitamins-Minerals (MULTIVITAMIN WITH MINERALS) tablet tablet, Take 1 tablet by mouth Daily., Disp: , Rfl:     ondansetron ODT (ZOFRAN-ODT) 8 MG disintegrating tablet, Take 1 tablet by mouth Every 8 (Eight) Hours As Needed for  nausea or vomiting., Disp: 16 tablet, Rfl: 0    oxyCODONE-acetaminophen (PERCOCET) 7.5-325 MG per tablet, Take 1 tablet by mouth Every 6 (Six) Hours As Needed., Disp: , Rfl:     Polyethylene Glycol 3350 (MIRALAX PO), Take  by mouth As Needed., Disp: , Rfl:     Potassium Gluconate 550 MG tablet, 4 90mg tablets a day., Disp: , Rfl:     rizatriptan (Maxalt) 10 MG tablet, Take 1 tablet at onset of headache.  May repeat once in 2 hours.  Do not take more than 2 tabs a day or 8 tabs a month, Disp: 8 tablet, Rfl: 3    tiZANidine (ZANAFLEX) 2 MG tablet, Every 6 (Six) Hours., Disp: , Rfl:     topiramate (TOPAMAX) 100 MG tablet, TAKE 1 TABLET BY MOUTH TWICE A DAY, Disp: 60 tablet, Rfl: 2    topiramate (TOPAMAX) 25 MG tablet, TAKE 1 TABLET BY MOUTH TWICE A DAY, Disp: 60 tablet, Rfl: 2    traZODone (DESYREL) 100 MG tablet, , Disp: , Rfl:     Wheat Dextrin (BENEFIBER DRINK MIX PO), Benefiber, Disp: , Rfl:     Allergies:   Allergies   Allergen Reactions    Latex Itching    Milk (Cow) GI Intolerance    Pollen Extract Itching    Savella [Milnacipran] Nausea And Vomiting    Soy Allergy Other (See Comments) and GI Intolerance    Wheat GI Intolerance    Ultram [Tramadol] Itching     Severe itching       Social History:   Social History     Socioeconomic History    Marital status:    Tobacco Use    Smoking status: Never     Passive exposure: Never    Smokeless tobacco: Never   Vaping Use    Vaping status: Never Used   Substance and Sexual Activity    Alcohol use: No    Drug use: No    Sexual activity: Defer     Birth control/protection: Post-menopausal        Surgical History:   Past Surgical History:   Procedure Laterality Date    BACK SURGERY      BARIATRIC SURGERY      BREAST BIOPSY Left     CHOLECYSTECTOMY N/A 06/07/2018    Procedure: CHOLECYSTECTOMY LAPAROSCOPIC;  Surgeon: Romana Torre MD;  Location: [facility];  Service: General    COLONOSCOPY      DILATATION AND CURETTAGE      ENDOSCOPY      GASTRIC BYPASS  2002     HYSTERECTOMY      ROTATOR CUFF REPAIR      bilateral    TONSILLECTOMY      TOTAL HIP ARTHROPLASTY Left 10/04/2016    Procedure: TOTAL HIP ARTHROPLASTY LEFT;  Surgeon: Arvind Oh MD;  Location: [facility];  Service:     TOTAL HIP ARTHROPLASTY Left     UPPER GASTROINTESTINAL ENDOSCOPY          Medical History:   Past Medical History:   Diagnosis Date    Anxiety     Arthritis     Depression     Kimberly-Danlos disease     GERD (gastroesophageal reflux disease)     GI bleed     History of transfusion     2 UNITS    Migraine     Restless legs     Wears eyeglasses         Objective     Physical Exam:  Vital Signs: There were no vitals filed for this visit.  There is no height or weight on file to calculate BMI.     Physical Exam  Vitals and nursing note reviewed.   Constitutional:       Appearance: Normal appearance. She is normal weight. She is not ill-appearing or diaphoretic.   HENT:      Head: Normocephalic and atraumatic.      Right Ear: External ear normal.      Left Ear: External ear normal.      Nose: Nose normal.      Mouth/Throat:      Mouth: Mucous membranes are moist.      Pharynx: Oropharynx is clear.   Eyes:      Conjunctiva/sclera: Conjunctivae normal.      Pupils: Pupils are equal, round, and reactive to light.   Neck:      Thyroid: No thyromegaly.   Cardiovascular:      Rate and Rhythm: Normal rate and regular rhythm.      Pulses: Normal pulses.      Heart sounds: Normal heart sounds.   Pulmonary:      Effort: Pulmonary effort is normal.      Breath sounds: Normal breath sounds.   Abdominal:      General: Abdomen is flat. Bowel sounds are normal. There is no distension.      Tenderness: There is no abdominal tenderness.      Comments: Previous midline surgical incision is well healed   Musculoskeletal:         General: Normal range of motion.      Cervical back: Normal range of motion and neck supple.   Skin:     General: Skin is warm and dry.   Neurological:      General: No focal deficit  present.      Mental Status: She is oriented to person, place, and time.   Psychiatric:         Mood and Affect: Mood normal.         Assessment / Plan      Assessment/Plan:   There are no diagnoses linked to this encounter.     GERD  IBS   - continue all medications as prescribed   - refills for esomeprazole 40mg BID sent to pharmacy    - pt given GERD diet instructions, advised to avoid GI irritants such as caffeine, carbonation, EtOH, tobacco, chocolate, peppermint, acid-based foods   - previous office notes, hospital records, labs, imaging, endoscopy and pathology reports reviewed with patient    - follow up in clinic in 1 year, or after completion of above studies   - call clinic at any time for questions or new / worsened sx    Follow Up:   Return in about 1 year (around 10/9/2025).    Plan of care reviewed with the patient at the conclusion of today's visit.  Education was provided regarding diagnosis, management, and any prescribed or recommended OTC medications.  Patient verbalized understanding of and agreement with management plan.     NOTE TO PATIENT: The 21st Century Cures Act makes medical notes like these available to patients in the interest of transparency. However, be advised this is a medical document. It is intended as peer to peer communication. It is written in medical language and may contain abbreviations or verbiage that are unfamiliar. It may appear blunt or direct. Medical documents are intended to carry relevant information, facts as evident, and the clinical opinion of the practitioner.     Time Statement:   Discussed plan of care in detail with patient today. Patient verbally understands and agrees. I have spent 30 minutes reviewing available diagnostics, obtaining history, examining the patient, developing a treatment plan, and educating the patient on disease process and plan of care.     Lizzy Lamas PA-C   [facility]

## 2024-11-27 ENCOUNTER — HOSPITAL ENCOUNTER (EMERGENCY)
Facility: HOSPITAL | Age: 59
Discharge: HOME | End: 2024-11-27
Attending: EMERGENCY MEDICINE | Admitting: EMERGENCY MEDICINE
Payer: MEDICARE

## 2024-11-27 ENCOUNTER — APPOINTMENT (OUTPATIENT)
Dept: GENERAL RADIOLOGY | Facility: HOSPITAL | Age: 59
End: 2024-11-27
Payer: MEDICARE

## 2024-11-27 VITALS
DIASTOLIC BLOOD PRESSURE: 98 MMHG | SYSTOLIC BLOOD PRESSURE: 158 MMHG | WEIGHT: 140 LBS | HEART RATE: 107 BPM | BODY MASS INDEX: 23.32 KG/M2 | OXYGEN SATURATION: 91 % | TEMPERATURE: 98.2 F | RESPIRATION RATE: 16 BRPM | HEIGHT: 65 IN

## 2024-11-27 DIAGNOSIS — M25.552 LEFT HIP PAIN: Primary | ICD-10-CM

## 2024-11-27 DIAGNOSIS — Z96.642 HISTORY OF TOTAL LEFT HIP ARTHROPLASTY: ICD-10-CM

## 2024-11-27 PROCEDURE — 99283 EMERGENCY DEPT VISIT LOW MDM: CPT

## 2024-11-27 PROCEDURE — 73502 X-RAY EXAM HIP UNI 2-3 VIEWS: CPT

## 2024-11-27 RX ORDER — TRANEXAMIC ACID 100 MG/ML
500 INJECTION, SOLUTION INTRAVENOUS ONCE
Status: DISCONTINUED | OUTPATIENT
Start: 2024-11-27 | End: 2024-11-27

## 2024-11-27 NOTE — ED PROVIDER NOTES
[place]    EMERGENCY DEPARTMENT ENCOUNTER      Pt Name: Yumiko Oswald  MRN: [id number]  YOB: 1965  Date of evaluation: 11/27/2024  Provider: Armond Gamboa DO    CHIEF COMPLAINT       Chief Complaint   Patient presents with    Hip Pain       HPI  Stated Reason for Visit: PT came in after getting out of the shower, noticed a knot on her left hip, replaced in 2016. Has HX of ED, and raynauds. History Obtained From: patient       HISTORY OF PRESENT ILLNESS  (Location/Symptom, Timing/Onset, Context/Setting, Quality, Duration, Modifying Factors, Severity.)   Yumiko Oswald is a 59 y.o. female who presents to the emergency department for evaluation of left hip injury which occurred just prior to arrival.  She was getting on the shower felt some pain along the left lateral hip, was able to ambulate although with discomfort, felt some sliding along the left hip.  She is post total hip arthroplasty in 2018.  Has a history of ether's Normaloe syndrome and has issues with recurrent subluxations of multiple joints in general.  She denies any sensory loss, she denies any other fall, injury or trauma.  Patient denies any other issues, no other acute systemic complaints.      Nursing notes were reviewed.      PAST MEDICAL HISTORY     Past Medical History:   Diagnosis Date    Anxiety     Arthritis     Depression     Kimberly-Danlos disease     GERD (gastroesophageal reflux disease)     GI bleed     History of transfusion     2 UNITS    Migraine     Restless legs     Wears eyeglasses          SURGICAL HISTORY       Past Surgical History:   Procedure Laterality Date    BACK SURGERY      BARIATRIC SURGERY      BREAST BIOPSY Left     CHOLECYSTECTOMY N/A 06/07/2018    Procedure: CHOLECYSTECTOMY LAPAROSCOPIC;  Surgeon: Romana Torre MD;  Location: [facility];  Service: General    COLONOSCOPY      DILATATION AND CURETTAGE      ENDOSCOPY      GASTRIC BYPASS  2002    HYSTERECTOMY      ROTATOR CUFF REPAIR       bilateral    TONSILLECTOMY      TOTAL HIP ARTHROPLASTY Left 10/04/2016    Procedure: TOTAL HIP ARTHROPLASTY LEFT;  Surgeon: Arvind Oh MD;  Location: [facility];  Service:     TOTAL HIP ARTHROPLASTY Left     UPPER GASTROINTESTINAL ENDOSCOPY           CURRENT MEDICATIONS     No current facility-administered medications for this encounter.    Current Outpatient Medications:     ALPRAZolam XR (XANAX XR) 0.5 MG 24 hr tablet, Take 1 tablet by mouth As Needed., Disp: , Rfl:     amLODIPine (NORVASC) 5 MG tablet, , Disp: , Rfl:     buPROPion XL (WELLBUTRIN XL) 300 MG 24 hr tablet, Take 1 tablet by mouth Daily., Disp: , Rfl:     cyanocobalamin 1000 MCG/ML injection, 1 mL Every 28 (Twenty-Eight) Days., Disp: , Rfl:     cyclobenzaprine (FLEXERIL) 10 MG tablet, Take 1 tablet 3 times a day by oral route for 30 days., Disp: , Rfl:     diphenhydrAMINE (BENADRYL) 25 mg capsule, Take 1 capsule by mouth At Night As Needed for Itching., Disp: , Rfl:     esomeprazole (nexIUM) 40 MG capsule, Take 1 capsule by mouth 2 (Two) Times a Day., Disp: 60 capsule, Rfl: 11    estradiol (ESTRACE) 0.1 MG/GM vaginal cream, , Disp: , Rfl:     hydroxychloroquine (PLAQUENIL) 200 MG tablet, , Disp: , Rfl:     lisinopril (PRINIVIL,ZESTRIL) 10 MG tablet, , Disp: , Rfl:     losartan (COZAAR) 50 MG tablet, , Disp: , Rfl:     Magnesium 500 MG capsule, Take 1 tablet by mouth Daily., Disp: , Rfl:     meloxicam (MOBIC) 15 MG tablet, meloxicam 15 mg tablet  Take 1 tablet every day by oral route., Disp: , Rfl:     montelukast (SINGULAIR) 10 MG tablet, , Disp: , Rfl:     Multiple Vitamins-Minerals (MULTIVITAMIN WITH MINERALS) tablet tablet, Take 1 tablet by mouth Daily., Disp: , Rfl:     ondansetron ODT (ZOFRAN-ODT) 8 MG disintegrating tablet, Take 1 tablet by mouth Every 8 (Eight) Hours As Needed for nausea or vomiting., Disp: 16 tablet, Rfl: 0    oxyCODONE-acetaminophen (PERCOCET) 7.5-325 MG per tablet, Take 1 tablet by mouth Every 6 (Six) Hours As  "Needed., Disp: , Rfl:     Polyethylene Glycol 3350 (MIRALAX PO), Take  by mouth As Needed., Disp: , Rfl:     Potassium Gluconate 550 MG tablet, 4 90mg tablets a day., Disp: , Rfl:     rizatriptan (Maxalt) 10 MG tablet, Take 1 tablet at onset of headache.  May repeat once in 2 hours.  Do not take more than 2 tabs a day or 8 tabs a month, Disp: 8 tablet, Rfl: 3    tiZANidine (ZANAFLEX) 2 MG tablet, Every 6 (Six) Hours., Disp: , Rfl:     traZODone (DESYREL) 100 MG tablet, , Disp: , Rfl:     Wheat Dextrin (BENEFIBER DRINK MIX PO), Benefiber, Disp: , Rfl:     ALLERGIES     Latex, Milk (cow), Pollen extract, Savella [milnacipran], Soy allergy, Wheat, Bee pollen, Ultram [tramadol], and Yeast    FAMILY HISTORY       Family History   Problem Relation Age of Onset    Colon polyps Mother     Dementia Mother     Lung cancer Mother     Heart failure Father     Breast cancer Maternal Grandmother 50    Breast cancer Maternal Aunt     Esophageal cancer Maternal Grandfather     Ovarian cancer Neg Hx     Colon cancer Neg Hx           SOCIAL HISTORY       Social History     Socioeconomic History    Marital status:    Tobacco Use    Smoking status: Never     Passive exposure: Never    Smokeless tobacco: Never   Vaping Use    Vaping status: Never Used   Substance and Sexual Activity    Alcohol use: No    Drug use: No    Sexual activity: Defer     Birth control/protection: Post-menopausal         PHYSICAL EXAM    (up to 7 for level 4, 8 or more for level 5)     Vitals:    11/27/24 1524   BP: (!) 165/101   BP Location: Right arm   Patient Position: Sitting   Pulse: 102   Resp: 16   Temp: 98.2 °F (36.8 °C)   TempSrc: Oral   SpO2: 100%   Weight: 63.5 kg (140 lb)   Height: 165.1 cm (65\")       Physical Exam  General : Patient is awake, alert, oriented, in no acute distress, nontoxic appearing  HEENT: Pupils are equally round, EOMI, conjunctivae clear  Neck: Neck is supple, full range of motion, trachea midline  Cardiac: Heart regular " rate, rhythm, no murmurs, rubs, or gallops  Lungs: Lungs are clear to auscultation, there is no wheezing, rhonchi, or rales. There is no use of accessory muscles  Abdomen: Abdomen is soft, nontender, nondistended. There are no firm or pulsatile masses, no rebound rigidity or guarding  Musculoskeletal: There is tenderness to palpation along the proximal left hip at the greater trochanter, difficult to ascertain for any acute deformity, she is able to flex and extend at the hip with limited mobility, distal pulses neurovascular status are intact.  5 out of 5 strength in all remaining extremities.  No focal muscle deficits are appreciated  Dermatology: Skin is warm and dry  Psych: Mentation is grossly normal, cognition is grossly normal. Affect is appropriate      DIAGNOSTIC RESULTS     EKG:  All EKGs are interpreted by the Emergency Department Physician who either signs or Co-signs this chart in the absence of a cardiologist.    No orders to display       RADIOLOGY:     [x] Radiologist's Report Reviewed:  XR Hip With or Without Pelvis 2 - 3 View Left   Final Result   No acute fracture or dislocation.         Electronically Signed: Anthony Nuno MD     11/27/2024 3:56 PM EST     Workstation ID: [id number]          I ordered and independently reviewed the above noted radiographic studies.      I viewed images of x-ray left hip which showed prior total hip, no signs of acute dislocation or abnormality per my independent interpretation.    See radiologist's dictation for official interpretation.      ED BEDSIDE ULTRASOUND:   Performed by ED Physician - none    LABS:    I have reviewed and interpreted all of the currently available lab results from this visit (if applicable):  Results for orders placed or performed during the hospital encounter of 09/03/24   ECG 12 Lead    Collection Time: 09/03/24 12:40 PM   Result Value Ref Range    QT Interval 372 ms    QTC Interval 439 ms        If labs were ordered, I independently  reviewed the results and considered them in treating the patient.      EMERGENCY DEPARTMENT COURSE and DIFFERENTIAL DIAGNOSIS/MDM:   Vitals:  AS OF 17:07 EST    BP - (!) 165/101  HR - 102  TEMP - 98.2 °F (36.8 °C) (Oral)  O2 SATS - 100%      Orders placed during this visit:  Orders Placed This Encounter   Procedures    XR Hip With or Without Pelvis 2 - 3 View Left       All labs have been independently reviewed by me.  All radiology studies have been reviewed by me and the radiologist dictating the report.  All EKG's have been independently viewed and interpreted by me.      Discussion below represents my analysis of pertinent findings related to patient's condition, differential diagnosis, treatment plan and final disposition.    Differential diagnosis:  The differential diagnosis associated with the patient's presentation includes: Left hip dislocation, subluxation, hip contusion, joint laxity, periprosthesis hardware malfunction acetabular piece rotation    Additional sources  Discussed/ obtained information from independent historians:   [x] Spouse  [] Parent  [] Family member  [] Friend  [] EMS   [] Other:    External (non-ED) record review:   [] Inpatient record:   [] Office record:   [] Outpatient record:   [] Prior Outpatient labs:   [] Prior Outpatient radiology:   [] Primary Care record:   [] Outside ED record:   [] Other:     Patient's care impacted by:   [] Diabetes  [] Hypertension  [] CHF  [] Hyperlipidemia  [] Coronary Artery Disease   [] COPD   [] Cancer   [] Tobacco Abuse   [] Substance Abuse    [x] Other: Erasto Schaefer, prior hip arthroplasty    Care significantly affected by Social Determinants of Health (housing and economic circumstances, unemployment)    [] Yes     [x] No   If yes, Patient's care significantly limited by Social Determinants of Health including:   [] Inadequate housing   [] Low income   [] Alcoholism and drug addiction in family   [] Problems related to primary support  group   [] Unemployment   [] Problems related to employment   [] Other Social Determinants of Health:       MEDICATIONS ADMINISTERED IN ED:  Medications - No data to display           This is a very pleasant 59-year-old female with left hip injury from a stepping out of the shower mechanism.  She does have tenderness overlying her left hip arthroplasty, difficult to ascertain for any acute or significant deformity, there is no contusion overlying this area, distal pulses neurovascular status are intact.  X-ray images obtained for further assessment.  X-ray images are unremarkable, hip appears well located, the hardware appears intact as well.  I discussed with the patient she could have had a partial subluxation or spontaneous reduction, can also have some soft tissue muscle skeletal type injury which could also be treated with her pain control, limited weightbearing as tolerated, range of motion exercises, therapies with follow-up with her orthopedic specialist.  She has appointment on December 8 with her orthopedist Dr. Jonas which she should maintain, monitoring her symptoms in the meantime, return to the ED with any worsening symptoms or further concerns.    I had a discussion with the patient/family regarding diagnosis, diagnostic results, treatment plan, and medications.  The patient/family indicated understanding of these instructions.  I spent adequate time at the bedside preceding discharge necessary to personally discuss the aftercare instructions, giving patient education, providing explanations of the results of our evaluations/findings, and my decision making to assure that the patient/family understand the plan of care.  Time was allotted to answer questions at that time and throughout the ED course.  Emphasis was placed on timely follow-up after discharge.  I also discussed the potential for the development of an acute emergent condition requiring further evaluation, admission, or even surgical  intervention. I discussed that we found nothing during the visit today indicating the need for further workup, admission, or the presence of an unstable medical condition.  I encouraged the patient to return to the emergency department immediately for ANY concerns, worsening, new complaints, or if symptoms persist and unable to seek follow-up in a timely fashion.  The patient/family expressed understanding and agreement with this plan.  The patient will follow-up with their PCP in 1-2 days for reevaluation.     PROCEDURES:  Procedures    CRITICAL CARE TIME    Total Critical Care time was 0 minutes, excluding separately reportable procedures.   There was a high probability of clinically significant/life threatening deterioration in the patient's condition which required my urgent intervention.      FINAL IMPRESSION      1. Left hip pain    2. History of total left hip arthroplasty          DISPOSITION/PLAN     ED Disposition       ED Disposition   Discharge    Condition   Stable    Comment   --               PATIENT REFERRED TO:  Juan Manuel Jonas MD  [address] [address]  [phone]    Schedule an appointment as soon as possible for a visit   Your orthopedic specialist as planned on December 8 or sooner if needed    Jade Cuellar MD  [address] [place], [place] [id number] [phone]    In 2 days      [facility] EMERGENCY DEPARTMENT  [address] [id number] [phone]    If symptoms worsen      DISCHARGE MEDICATIONS:     Medication List        CONTINUE taking these medications      ALPRAZolam XR 0.5 MG 24 hr tablet  Commonly known as: XANAX XR     amLODIPine 5 MG tablet  Commonly known as: NORVASC     BENEFIBER DRINK MIX PO     buPROPion  MG 24 hr tablet  Commonly known as: WELLBUTRIN XL     cyanocobalamin 1000 MCG/ML injection     cyclobenzaprine 10 MG tablet  Commonly known as: FLEXERIL     diphenhydrAMINE 25 mg  capsule  Commonly known as: BENADRYL     esomeprazole 40 MG capsule  Commonly known as: nexIUM  Take 1 capsule by mouth 2 (Two) Times a Day.     estradiol 0.1 MG/GM vaginal cream  Commonly known as: ESTRACE     hydroxychloroquine 200 MG tablet  Commonly known as: PLAQUENIL     lisinopril 10 MG tablet  Commonly known as: PRINIVIL,ZESTRIL     losartan 50 MG tablet  Commonly known as: COZAAR     Magnesium 500 MG capsule     meloxicam 15 MG tablet  Commonly known as: MOBIC     MIRALAX PO     montelukast 10 MG tablet  Commonly known as: SINGULAIR     multivitamin with minerals tablet tablet     ondansetron ODT 8 MG disintegrating tablet  Commonly known as: ZOFRAN-ODT  Take 1 tablet by mouth Every 8 (Eight) Hours As Needed for nausea or vomiting.     oxyCODONE-acetaminophen 7.5-325 MG per tablet  Commonly known as: PERCOCET     Potassium Gluconate 550 MG tablet     rizatriptan 10 MG tablet  Commonly known as: Maxalt  Take 1 tablet at onset of headache.  May repeat once in 2 hours.  Do not take more than 2 tabs a day or 8 tabs a month     tiZANidine 2 MG tablet  Commonly known as: ZANAFLEX     traZODone 100 MG tablet  Commonly known as: DESYREL                  Comment: Please note this report has been produced using speech recognition software.      Armond Gamboa DO  Attending Emergency Physician         Armond Gamboa DO  11/27/24 [id number]

## 2024-12-09 ENCOUNTER — LAB (OUTPATIENT)
Dept: LAB | Facility: HOSPITAL | Age: 59
End: 2024-12-09
Payer: MEDICARE

## 2024-12-09 ENCOUNTER — OFFICE VISIT (OUTPATIENT)
Dept: ORTHOPEDIC SURGERY | Facility: CLINIC | Age: 59
End: 2024-12-09
Payer: MEDICARE

## 2024-12-09 VITALS
WEIGHT: 150.4 LBS | SYSTOLIC BLOOD PRESSURE: 128 MMHG | DIASTOLIC BLOOD PRESSURE: 72 MMHG | BODY MASS INDEX: 25.06 KG/M2 | HEIGHT: 65 IN

## 2024-12-09 DIAGNOSIS — Z96.642 STATUS POST TOTAL REPLACEMENT OF LEFT HIP: ICD-10-CM

## 2024-12-09 DIAGNOSIS — Z96.642 STATUS POST TOTAL REPLACEMENT OF LEFT HIP: Primary | ICD-10-CM

## 2024-12-09 DIAGNOSIS — M25.552 PAIN OF LEFT HIP: ICD-10-CM

## 2024-12-09 LAB
BASOPHILS # BLD AUTO: 0.05 10*3/MM3 (ref 0–0.2)
BASOPHILS NFR BLD AUTO: 1.2 % (ref 0–1.5)
CRP SERPL-MCNC: <0.3 MG/DL (ref 0–0.5)
DEPRECATED RDW RBC AUTO: 48.6 FL (ref 37–54)
EOSINOPHIL # BLD AUTO: 0.13 10*3/MM3 (ref 0–0.4)
EOSINOPHIL NFR BLD AUTO: 3 % (ref 0.3–6.2)
ERYTHROCYTE [DISTWIDTH] IN BLOOD BY AUTOMATED COUNT: 16.2 % (ref 12.3–15.4)
ERYTHROCYTE [SEDIMENTATION RATE] IN BLOOD: 8 MM/HR (ref 0–30)
HCT VFR BLD AUTO: 32 % (ref 34–46.6)
HGB BLD-MCNC: 10.4 G/DL (ref 12–15.9)
IMM GRANULOCYTES # BLD AUTO: 0.01 10*3/MM3 (ref 0–0.05)
IMM GRANULOCYTES NFR BLD AUTO: 0.2 % (ref 0–0.5)
LYMPHOCYTES # BLD AUTO: 1.22 10*3/MM3 (ref 0.7–3.1)
LYMPHOCYTES NFR BLD AUTO: 28.2 % (ref 19.6–45.3)
MCH RBC QN AUTO: 26.7 PG (ref 26.6–33)
MCHC RBC AUTO-ENTMCNC: 32.5 G/DL (ref 31.5–35.7)
MCV RBC AUTO: 82.3 FL (ref 79–97)
MONOCYTES # BLD AUTO: 0.46 10*3/MM3 (ref 0.1–0.9)
MONOCYTES NFR BLD AUTO: 10.6 % (ref 5–12)
NEUTROPHILS NFR BLD AUTO: 2.46 10*3/MM3 (ref 1.7–7)
NEUTROPHILS NFR BLD AUTO: 56.8 % (ref 42.7–76)
NRBC BLD AUTO-RTO: 0 /100 WBC (ref 0–0.2)
PLATELET # BLD AUTO: 367 10*3/MM3 (ref 140–450)
PMV BLD AUTO: 8.7 FL (ref 6–12)
RBC # BLD AUTO: 3.89 10*6/MM3 (ref 3.77–5.28)
WBC NRBC COR # BLD AUTO: 4.33 10*3/MM3 (ref 3.4–10.8)

## 2024-12-09 PROCEDURE — 99204 OFFICE O/P NEW MOD 45 MIN: CPT | Performed by: ORTHOPAEDIC SURGERY

## 2024-12-09 PROCEDURE — 3074F SYST BP LT 130 MM HG: CPT | Performed by: ORTHOPAEDIC SURGERY

## 2024-12-09 PROCEDURE — 36415 COLL VENOUS BLD VENIPUNCTURE: CPT

## 2024-12-09 PROCEDURE — 3078F DIAST BP <80 MM HG: CPT | Performed by: ORTHOPAEDIC SURGERY

## 2024-12-09 PROCEDURE — 85652 RBC SED RATE AUTOMATED: CPT

## 2024-12-09 PROCEDURE — 86140 C-REACTIVE PROTEIN: CPT

## 2024-12-09 PROCEDURE — 1159F MED LIST DOCD IN RCRD: CPT | Performed by: ORTHOPAEDIC SURGERY

## 2024-12-09 PROCEDURE — 85025 COMPLETE CBC W/AUTO DIFF WBC: CPT

## 2024-12-09 PROCEDURE — 1160F RVW MEDS BY RX/DR IN RCRD: CPT | Performed by: ORTHOPAEDIC SURGERY

## 2024-12-09 NOTE — PROGRESS NOTES
[facility] Note    Subjective     Chief Complaint   Patient presents with    Left Hip - Pain        HPI    Yumiko Oswald is a 59 y.o. female who presents with new problem of: left hip pain.  Onset: atraumatic and gradual in nature. The issue has been ongoing for 2 week(s). Pain is a 4/10 on the pain scale. Pain is described as aching, throbbing, and stabbing. Associated symptoms include pain and popping. The pain is worse with walking, standing, climbing stairs, and lying on affected side; resting improve the pain. Previous treatments have included: NSAIDS.  No history of trauma, but she did have an episode where she had some swelling on the posterior lateral aspect of her hip, and went to the emergency room to have it evaluated.  X-rays were obtained.  She was having difficulty bearing weight with the onset of that swelling.  History of left total hip arthroplasty on 10/4/2016 with Dr. Murphy Oh.  No fevers, chills or night sweats.  History of Kimberly-Danlos syndrome.    I have reviewed the following portions of the patient's history and agree with: History of Present Illness and Review of Systems    Patient Active Problem List   Diagnosis    Status post total replacement of left hip    HTN (hypertension)    Anxiety    Arthritis    Abnormal EKG    Depression    Kimberly-Danlos disease    Migraine    Restless legs    Fibromyalgia    Tachycardia    Chest pain    Dyslipidemia    Shortness of breath    GERD    DDD (degenerative disc disease), thoracic    Chronic fatigue syndrome    Acute maxillary sinusitis    Acute laryngitis    Duodenal ulcer    Idiopathic osteoarthritis    Melena    Melena    Raynaud's disease    Spinal stenosis in cervical region    Thoracic outlet syndrome     Past Medical History:   Diagnosis Date    Anxiety     Arthritis     Depression     Kimberly-Danlos disease     GERD (gastroesophageal reflux disease)     GI bleed     History of transfusion     2 UNITS    Migraine      Restless legs     Wears eyeglasses       Past Surgical History:   Procedure Laterality Date    BACK SURGERY      BARIATRIC SURGERY      BREAST BIOPSY Left     CHOLECYSTECTOMY N/A 06/07/2018    Procedure: CHOLECYSTECTOMY LAPAROSCOPIC;  Surgeon: Romana Torre MD;  Location:  [facility];  Service: General    COLONOSCOPY      DILATATION AND CURETTAGE      ENDOSCOPY      GASTRIC BYPASS  2002    HYSTERECTOMY      ROTATOR CUFF REPAIR      bilateral    TONSILLECTOMY      TOTAL HIP ARTHROPLASTY Left 10/04/2016    Procedure: TOTAL HIP ARTHROPLASTY LEFT;  Surgeon: Arvind Oh MD;  Location:  [facility];  Service:     TOTAL HIP ARTHROPLASTY Left     TOTAL SHOULDER REVERSE ARTHROPLASTY Right 09/2024    UPPER GASTROINTESTINAL ENDOSCOPY        Family History   Problem Relation Age of Onset    Colon polyps Mother     Dementia Mother     Lung cancer Mother     Heart failure Father     Breast cancer Maternal Grandmother 50    Breast cancer Maternal Aunt     Esophageal cancer Maternal Grandfather     Ovarian cancer Neg Hx     Colon cancer Neg Hx      Social History     Socioeconomic History    Marital status:    Tobacco Use    Smoking status: Never     Passive exposure: Never    Smokeless tobacco: Never   Vaping Use    Vaping status: Never Used   Substance and Sexual Activity    Alcohol use: No    Drug use: No    Sexual activity: Defer     Birth control/protection: Post-menopausal      Current Outpatient Medications on File Prior to Visit   Medication Sig Dispense Refill    ALPRAZolam XR (XANAX XR) 0.5 MG 24 hr tablet Take 1 tablet by mouth As Needed.      amLODIPine (NORVASC) 5 MG tablet       buPROPion XL (WELLBUTRIN XL) 300 MG 24 hr tablet Take 1 tablet by mouth Daily.      cyanocobalamin 1000 MCG/ML injection 1 mL Every 28 (Twenty-Eight) Days.      cyclobenzaprine (FLEXERIL) 10 MG tablet Take 1 tablet 3 times a day by oral route for 30 days.      diphenhydrAMINE (BENADRYL) 25 mg capsule Take 1 capsule by mouth  At Night As Needed for Itching.      esomeprazole (nexIUM) 40 MG capsule Take 1 capsule by mouth 2 (Two) Times a Day. 60 capsule 11    estradiol (ESTRACE) 0.1 MG/GM vaginal cream       hydroxychloroquine (PLAQUENIL) 200 MG tablet       lisinopril (PRINIVIL,ZESTRIL) 10 MG tablet       losartan (COZAAR) 50 MG tablet       Magnesium 500 MG capsule Take 1 tablet by mouth Daily.      meloxicam (MOBIC) 15 MG tablet meloxicam 15 mg tablet   Take 1 tablet every day by oral route.      montelukast (SINGULAIR) 10 MG tablet       Multiple Vitamins-Minerals (MULTIVITAMIN WITH MINERALS) tablet tablet Take 1 tablet by mouth Daily.      ondansetron ODT (ZOFRAN-ODT) 8 MG disintegrating tablet Take 1 tablet by mouth Every 8 (Eight) Hours As Needed for nausea or vomiting. 16 tablet 0    oxyCODONE-acetaminophen (PERCOCET) 7.5-325 MG per tablet Take 1 tablet by mouth Every 6 (Six) Hours As Needed.      Polyethylene Glycol 3350 (MIRALAX PO) Take  by mouth As Needed.      Potassium Gluconate 550 MG tablet 4 90mg tablets a day.      rizatriptan (Maxalt) 10 MG tablet Take 1 tablet at onset of headache.  May repeat once in 2 hours.  Do not take more than 2 tabs a day or 8 tabs a month 8 tablet 3    tiZANidine (ZANAFLEX) 2 MG tablet Every 6 (Six) Hours.      traZODone (DESYREL) 100 MG tablet       Wheat Dextrin (BENEFIBER DRINK MIX PO) Benefiber       No current facility-administered medications on file prior to visit.      Allergies   Allergen Reactions    Latex Itching    Milk (Cow) GI Intolerance    Pollen Extract Itching    Savella [Milnacipran] Nausea And Vomiting    Soy Allergy Other (See Comments) and GI Intolerance    Wheat GI Intolerance    Bee Pollen Other (See Comments)    Ultram [Tramadol] Itching     Severe itching    Yeast Other (See Comments)     Bloating/Gas        Review of Systems   Constitutional:  Negative for activity change, appetite change, chills, diaphoresis, fatigue, fever and unexpected weight change.   HENT:   "Negative for congestion, dental problem, drooling, ear discharge, ear pain, facial swelling, hearing loss, mouth sores, nosebleeds, postnasal drip, rhinorrhea, sinus pressure, sneezing, sore throat, tinnitus, trouble swallowing and voice change.    Eyes:  Negative for photophobia, pain, discharge, redness, itching and visual disturbance.   Respiratory:  Negative for apnea, cough, choking, chest tightness, shortness of breath, wheezing and stridor.    Cardiovascular:  Negative for chest pain, palpitations and leg swelling.   Gastrointestinal:  Negative for abdominal distention, abdominal pain, anal bleeding, blood in stool, constipation, diarrhea, nausea, rectal pain and vomiting.   Endocrine: Negative for cold intolerance, heat intolerance, polydipsia, polyphagia and polyuria.   Genitourinary:  Negative for decreased urine volume, difficulty urinating, dysuria, enuresis, flank pain, frequency, genital sores, hematuria and urgency.   Musculoskeletal:  Positive for arthralgias. Negative for back pain, gait problem, joint swelling, myalgias, neck pain and neck stiffness.   Skin:  Negative for color change, pallor, rash and wound.   Allergic/Immunologic: Negative for environmental allergies, food allergies and immunocompromised state.   Neurological:  Negative for dizziness, tremors, seizures, syncope, facial asymmetry, speech difficulty, weakness, light-headedness, numbness and headaches.   Hematological:  Negative for adenopathy. Does not bruise/bleed easily.   Psychiatric/Behavioral:  Negative for agitation, behavioral problems, confusion, decreased concentration, dysphoric mood, hallucinations, self-injury, sleep disturbance and suicidal ideas. The patient is not nervous/anxious and is not hyperactive.         Objective      Physical Exam  /72   Ht 165.1 cm (65\")   Wt 68.2 kg (150 lb 6.4 oz)   BMI 25.03 kg/m²     Body mass index is 25.03 kg/m².  BMI is >= 25 and <30. (Overweight) The following options were " offered after discussion;: none (medical contraindication)        General:   Mental Status:  Alert   Appearance: Cooperative, in no acute distress   Build and Nutrition: Well-nourished well-developed female   Orientation: Alert and oriented to person, place and time   Posture: Normal   Gait: Mild limp on the left    Integument:   Left hip: Wound is well-healed with no signs of infection    Lower Extremity:   Left Hip:    Tenderness:  None    Swelling:  None    Crepitus:  None    Range of motion:  External Rotation: 30°       Internal Rotation: 30°       Flexion:  100°       Extension:  0°    Deformities:  None  Functional testing: [facility]    No leg length discrepancy      Imaging/Studies      Imaging Results (Last 24 Hours)       ** No results found for the last 24 hours. **          XR HIP W OR WO PELVIS 2-3 VIEW LEFT     Date of Exam: 11/27/2024 3:36 PM EST     Indication: pain, lump after shower, history replacement     Comparison: None available.     Findings: Left hip replacement. Hardware appears intact and appropriately situated. The sacroiliac joints are normal. The iliac wings are intact. The pubic bones are intact. The proximal right femur is intact. Degenerative changes of the right hip.     IMPRESSION:  No acute fracture or dislocation.        Electronically Signed: Anthony Nuno MD    11/27/2024 3:56 PM EST    Workstation ID: [id number]    I reviewed the above imaging, and agree with findings.    Assessment and Plan     Diagnoses and all orders for this visit:    1. Status post total replacement of left hip (Primary)  -     CBC & Differential; Future  -     C-reactive Protein; Future  -     Sedimentation Rate; Future    2. Pain of left hip        1. Status post total replacement of left hip    2. Pain of left hip          I reviewed my findings with the patient.  I am not sure what caused her focal area of swelling that has subsequently resolved in her hip.  Her hip pain has improved since the  onset, but she still has a slight limp.  At this point, I have recommended screening labs for infection, although I think is low likelihood.  We can review those by phone, as she is planning to go to [place] imminently for 2 months.  I will see her back when she returns from [place], but certainly if the inflammatory markers are positive she may require further workup.    Return in about 2 months (around 2/9/2025).      Juan Manuel Jonas MD  12/09/24  08:45 EST      Dictated Utilizing Dragon Dictation

## 2024-12-12 ENCOUNTER — TELEPHONE (OUTPATIENT)
Dept: ORTHOPEDIC SURGERY | Facility: CLINIC | Age: 59
End: 2024-12-12
Payer: MEDICARE

## 2024-12-12 NOTE — TELEPHONE ENCOUNTER
I called the patient to review her blood work results.  She is already down in [place].  Her sed rate, CRP and white blood cell count are all normal.  She also told me that her hip is feeling better.  Will see her when she returns from [place], but I will be happy to see her back sooner for any problems.    Lab Results   Component Value Date    SEDRATE 8 12/09/2024    WBC 4.33 12/09/2024    CRP <0.30 12/09/2024         Juan Manuel Jonas MD  12/12/24  15:41 EST

## 2025-02-24 ENCOUNTER — OFFICE VISIT (OUTPATIENT)
Dept: ORTHOPEDIC SURGERY | Facility: CLINIC | Age: 60
End: 2025-02-24
Payer: MEDICARE

## 2025-02-24 VITALS
BODY MASS INDEX: 25.33 KG/M2 | DIASTOLIC BLOOD PRESSURE: 72 MMHG | HEIGHT: 65 IN | WEIGHT: 152 LBS | SYSTOLIC BLOOD PRESSURE: 122 MMHG

## 2025-02-24 DIAGNOSIS — Z96.642 STATUS POST TOTAL REPLACEMENT OF LEFT HIP: Primary | ICD-10-CM

## 2025-02-24 PROCEDURE — 1159F MED LIST DOCD IN RCRD: CPT | Performed by: ORTHOPAEDIC SURGERY

## 2025-02-24 PROCEDURE — 3074F SYST BP LT 130 MM HG: CPT | Performed by: ORTHOPAEDIC SURGERY

## 2025-02-24 PROCEDURE — 3078F DIAST BP <80 MM HG: CPT | Performed by: ORTHOPAEDIC SURGERY

## 2025-02-24 PROCEDURE — 1160F RVW MEDS BY RX/DR IN RCRD: CPT | Performed by: ORTHOPAEDIC SURGERY

## 2025-02-24 PROCEDURE — 99212 OFFICE O/P EST SF 10 MIN: CPT | Performed by: ORTHOPAEDIC SURGERY

## 2025-02-24 NOTE — PROGRESS NOTES
[facility] Note    Subjective     Chief Complaint   Patient presents with    Follow-up     2.5 month follow up - Pain of left hip, 8 years S/P Total Hip Arthroplasty Left (DOS: 10/4/16 by Dr. Martin)        HPI    It has been 2.5  month(s) since Ms. Oswald's last visit. She returns to clinic today for follow-up of left hip pain. The issue has been ongoing for 3 month(s). She rates her pain a 1/10 on the pain scale. Previous/current treatments: NSAIDS. Current symptoms:  none . The pain is worse with  none ; does not need treatment to improve the pain. Overall, she is doing better.  The pain that she was having last time she was here has resolved.      I have reviewed the following portions of the patient's history and agree with: History of Present Illness and Review of Systems    Patient Active Problem List   Diagnosis    Status post total replacement of left hip    HTN (hypertension)    Anxiety    Arthritis    Abnormal EKG    Depression    Kimberly-Danlos disease    Migraine    Restless legs    Fibromyalgia    Tachycardia    Chest pain    Dyslipidemia    Shortness of breath    GERD    DDD (degenerative disc disease), thoracic    Chronic fatigue syndrome    Acute maxillary sinusitis    Acute laryngitis    Duodenal ulcer    Idiopathic osteoarthritis    Melena    Melena    Raynaud's disease    Spinal stenosis in cervical region    Thoracic outlet syndrome     Past Medical History:   Diagnosis Date    Ankle sprain     Anxiety     Arthritis     Arthritis of back     Arthritis of neck     Bursitis of hip     Cervical disc disorder     Depression     Dislocated elbow     Dislocation of finger     Dislocation, shoulder     Kimberly-Danlos disease     GERD (gastroesophageal reflux disease)     GI bleed     Hip arthrosis     History of transfusion     2 UNITS    Knee swelling     Low back strain     Lumbosacral disc disease     Migraine     Neck strain     Periarthritis of shoulder     Restless legs      Rotator cuff syndrome     Thoracic disc disorder     Wears eyeglasses     Wrist sprain       Past Surgical History:   Procedure Laterality Date    BACK SURGERY      BARIATRIC SURGERY      BREAST BIOPSY Left     CHOLECYSTECTOMY N/A 06/07/2018    Procedure: CHOLECYSTECTOMY LAPAROSCOPIC;  Surgeon: Romana Torre MD;  Location:  [facility];  Service: General    COLONOSCOPY      DILATATION AND CURETTAGE      ENDOSCOPY      GASTRIC BYPASS  2002    HYSTERECTOMY      JOINT REPLACEMENT      Left hip / right shoulder    ROTATOR CUFF REPAIR      bilateral    SHOULDER SURGERY  9/11/2024    Right shoulder replacement    TONSILLECTOMY      TOTAL HIP ARTHROPLASTY Left 10/04/2016    Procedure: TOTAL HIP ARTHROPLASTY LEFT;  Surgeon: Arvind Oh MD;  Location:  [facility];  Service:     TOTAL HIP ARTHROPLASTY Left     TOTAL SHOULDER REVERSE ARTHROPLASTY Right 09/2024    TRIGGER POINT INJECTION      UPPER GASTROINTESTINAL ENDOSCOPY        Family History   Problem Relation Age of Onset    Colon polyps Mother     Dementia Mother     Lung cancer Mother     Cancer Mother         Thyroid, lung, skin    Dislocations Mother     Severe sprains Mother     Heart failure Father     Breast cancer Maternal Grandmother 50    Breast cancer Maternal Aunt     Esophageal cancer Maternal Grandfather     Ovarian cancer Neg Hx     Colon cancer Neg Hx      Social History     Socioeconomic History    Marital status:    Tobacco Use    Smoking status: Never     Passive exposure: Never    Smokeless tobacco: Never   Vaping Use    Vaping status: Never Used   Substance and Sexual Activity    Alcohol use: No    Drug use: No    Sexual activity: Yes     Partners: Male     Birth control/protection: Post-menopausal, Hysterectomy      Current Outpatient Medications on File Prior to Visit   Medication Sig Dispense Refill    ALPRAZolam XR (XANAX XR) 0.5 MG 24 hr tablet Take 1 tablet by mouth As Needed.      amLODIPine (NORVASC) 5 MG tablet        buPROPion XL (WELLBUTRIN XL) 300 MG 24 hr tablet Take 1 tablet by mouth Daily.      cyanocobalamin 1000 MCG/ML injection 1 mL Every 28 (Twenty-Eight) Days.      cyclobenzaprine (FLEXERIL) 10 MG tablet Take 1 tablet 3 times a day by oral route for 30 days.      diphenhydrAMINE (BENADRYL) 25 mg capsule Take 1 capsule by mouth At Night As Needed for Itching.      esomeprazole (nexIUM) 40 MG capsule Take 1 capsule by mouth 2 (Two) Times a Day. 60 capsule 11    estradiol (ESTRACE) 0.1 MG/GM vaginal cream       hydroxychloroquine (PLAQUENIL) 200 MG tablet       lisinopril (PRINIVIL,ZESTRIL) 10 MG tablet       Magnesium 500 MG capsule Take 1 tablet by mouth Daily.      meloxicam (MOBIC) 15 MG tablet meloxicam 15 mg tablet   Take 1 tablet every day by oral route.      montelukast (SINGULAIR) 10 MG tablet       Multiple Vitamins-Minerals (MULTIVITAMIN WITH MINERALS) tablet tablet Take 1 tablet by mouth Daily.      ondansetron ODT (ZOFRAN-ODT) 8 MG disintegrating tablet Take 1 tablet by mouth Every 8 (Eight) Hours As Needed for nausea or vomiting. 16 tablet 0    oxyCODONE-acetaminophen (PERCOCET) 7.5-325 MG per tablet Take 1 tablet by mouth Every 6 (Six) Hours As Needed.      Polyethylene Glycol 3350 (MIRALAX PO) Take  by mouth As Needed.      Potassium Gluconate 550 MG tablet 4 90mg tablets a day.      rizatriptan (Maxalt) 10 MG tablet Take 1 tablet at onset of headache.  May repeat once in 2 hours.  Do not take more than 2 tabs a day or 8 tabs a month 8 tablet 3    tiZANidine (ZANAFLEX) 2 MG tablet Every 6 (Six) Hours.      traZODone (DESYREL) 100 MG tablet       Wheat Dextrin (BENEFIBER DRINK MIX PO) Benefiber      [DISCONTINUED] losartan (COZAAR) 50 MG tablet        No current facility-administered medications on file prior to visit.      Allergies   Allergen Reactions    Latex Itching    Milk (Cow) GI Intolerance    Pollen Extract Itching    Savella [Milnacipran] Nausea And Vomiting    Soy Allergy (Do Not Select) Other  (See Comments) and GI Intolerance    Wheat GI Intolerance    Bee Pollen Other (See Comments)    Ultram [Tramadol] Itching     Severe itching    Yeast Other (See Comments)     Bloating/Gas        Review of Systems   Constitutional:  Negative for activity change, appetite change, chills, diaphoresis, fatigue, fever and unexpected weight change.   HENT:  Negative for congestion, dental problem, drooling, ear discharge, ear pain, facial swelling, hearing loss, mouth sores, nosebleeds, postnasal drip, rhinorrhea, sinus pressure, sneezing, sore throat, tinnitus, trouble swallowing and voice change.    Eyes:  Negative for photophobia, pain, discharge, redness, itching and visual disturbance.   Respiratory:  Negative for apnea, cough, choking, chest tightness, shortness of breath, wheezing and stridor.    Cardiovascular:  Negative for chest pain, palpitations and leg swelling.   Gastrointestinal:  Negative for abdominal distention, abdominal pain, anal bleeding, blood in stool, constipation, diarrhea, nausea, rectal pain and vomiting.   Endocrine: Negative for cold intolerance, heat intolerance, polydipsia, polyphagia and polyuria.   Genitourinary:  Negative for decreased urine volume, difficulty urinating, dysuria, enuresis, flank pain, frequency, genital sores, hematuria and urgency.   Musculoskeletal:  Positive for arthralgias. Negative for back pain, gait problem, joint swelling, myalgias, neck pain and neck stiffness.   Skin:  Negative for color change, pallor, rash and wound.   Allergic/Immunologic: Negative for environmental allergies, food allergies and immunocompromised state.   Neurological:  Negative for dizziness, tremors, seizures, syncope, facial asymmetry, speech difficulty, weakness, light-headedness, numbness and headaches.   Hematological:  Negative for adenopathy. Does not bruise/bleed easily.   Psychiatric/Behavioral:  Negative for agitation, behavioral problems, confusion, decreased concentration,  "dysphoric mood, hallucinations, self-injury, sleep disturbance and suicidal ideas. The patient is not nervous/anxious and is not hyperactive.         Objective      Physical Exam  /72   Ht 165.1 cm (65\")   Wt 68.9 kg (152 lb)   BMI 25.29 kg/m²     Body mass index is 25.29 kg/m².  BMI is >= 25 and <30. (Overweight) The following options were offered after discussion;: referral to primary care      General:   Mental Status:  Alert   Appearance: Cooperative, in no acute distress   Build and Nutrition: Well-nourished well-developed female   Orientation: Alert and oriented to person, place and time   Posture: Normal   Gait: Nonantalgic/normal    Integument:   Left hip: Wound is well-healed with no signs of infection    Lower Extremity:   Left Hip:    Tenderness:  None    Swelling:  None    Crepitus:  None    Range of motion:  External Rotation: 30°       Internal Rotation: 30°       Flexion:  100°       Extension:  0°    Deformities:  None  Functional testing: Negative [facility]    No leg length discrepancy      Imaging/Studies  Imaging Results (Last 24 Hours)       ** No results found for the last 24 hours. **          No new imaging today.      Lab Results   Component Value Date    SEDRATE 8 12/09/2024    WBC 4.33 12/09/2024    CRP <0.30 12/09/2024         Assessment and Plan     Diagnoses and all orders for this visit:    1. Status post total replacement of left hip (Primary)        1. Status post total replacement of left hip          I reviewed my findings with the patient.  Her left hip pain has resolved.  Inflammatory markers were negative.  I will see her back if she has recurrence in the future.  Otherwise, routine follow-ups at 5-year increments were recommended.    Return in about 5 years (around 2/24/2030) for recheck with x-rays.      Juan Manuel Jonas MD  02/24/25  13:25 EST    Dictated Utilizing Dragon Dictation    "

## 2025-03-18 ENCOUNTER — OFFICE VISIT (OUTPATIENT)
Dept: NEUROLOGY | Facility: CLINIC | Age: 60
End: 2025-03-18
Payer: MEDICARE

## 2025-03-18 VITALS
RESPIRATION RATE: 18 BRPM | WEIGHT: 155 LBS | HEART RATE: 98 BPM | BODY MASS INDEX: 25.83 KG/M2 | SYSTOLIC BLOOD PRESSURE: 128 MMHG | DIASTOLIC BLOOD PRESSURE: 76 MMHG | OXYGEN SATURATION: 100 % | HEIGHT: 65 IN

## 2025-03-18 DIAGNOSIS — M54.81 BILATERAL OCCIPITAL NEURALGIA: Primary | ICD-10-CM

## 2025-03-18 PROCEDURE — 1160F RVW MEDS BY RX/DR IN RCRD: CPT | Performed by: PSYCHIATRY & NEUROLOGY

## 2025-03-18 PROCEDURE — 99214 OFFICE O/P EST MOD 30 MIN: CPT | Performed by: PSYCHIATRY & NEUROLOGY

## 2025-03-18 PROCEDURE — 3078F DIAST BP <80 MM HG: CPT | Performed by: PSYCHIATRY & NEUROLOGY

## 2025-03-18 PROCEDURE — 3074F SYST BP LT 130 MM HG: CPT | Performed by: PSYCHIATRY & NEUROLOGY

## 2025-03-18 PROCEDURE — 1159F MED LIST DOCD IN RCRD: CPT | Performed by: PSYCHIATRY & NEUROLOGY

## 2025-03-18 RX ORDER — DESVENLAFAXINE 50 MG/1
TABLET, FILM COATED, EXTENDED RELEASE ORAL
COMMUNITY
Start: 2025-03-17

## 2025-03-18 RX ORDER — TOPIRAMATE 100 MG/1
100 TABLET, FILM COATED ORAL 2 TIMES DAILY
Qty: 60 TABLET | Refills: 5 | Status: SHIPPED | OUTPATIENT
Start: 2025-03-18

## 2025-03-18 RX ORDER — VALSARTAN 160 MG/1
TABLET ORAL
COMMUNITY
Start: 2025-03-17

## 2025-03-18 NOTE — PROGRESS NOTES
Subjective:    CC: Yumiko Oswald is seen today for Migraine and occipital headaches     Current visit-patient last saw me in August.  She could not come for her follow-up nerve block as she had right shoulder surgery which went off well.  Is currently taking Percocet 3 times a day.  She states that her headache frequency has worsened and in the past 3 weeks she has had a continuous occipital headache that radiates to the top of her head.  Continues to take Topamax 50/100 mg and Maxalt that is not really helping.  Her blood pressure has also been running high and her PCP has recently adjusted her blood pressure medications which she is yet to start.      Last visit-occipital nerve block given     Previous visit-patient states that she was unable to get rhizotomy as she was told by insurance that she would need a medial branch block first which she got at [facility].  It only helped with the pain in her neck for 1 to 2 days.  She has had a near daily occipital headache on both sides.  Also has about 1-2 migraine headaches a week.  She forgot to increase her Topamax and continues to take a dose of 100 mg twice a day.  Also continues to take amitriptyline 25 mg nightly but her psychiatrist wants her to switch to Savella instead.  Amitriptyline does not really help her sleep at night.    Last visit-patient states that her headaches have improved minimally since starting amitriptyline however her sleep is better.  She is currently taking a dose of 25 mg nightly as the 10 mg dose did not help at all.  She is still waiting for her rhizotomy which is scheduled for next month.  She is having constant neck pain/shoulder pain secondary to her Kimberly-Danlos syndrome radiating into the back of her head.  The occipital nerve block only helped some.  She has about 1 migraine headache a week.  Continues to take Topamax 125 mg twice a day.     Last visit-patient states that she is having frequent occipital headaches as the last  nerve block did not help much.  The pain is radiating into her ears.  Her rhizotomy is not scheduled until April.  Her migraine headaches have improved since increasing Topamax to 125 mg twice a day.  She is still having about 1 migraine a week for which she takes Maxalt plus Zofran.  She is also having difficulty sleeping at night due to her headaches, neck pain and diffuse arthralgias.  Of note-patient was complaining of a severe headache today.  I gave her Ubrelvy 100 mg which helped with the severity of the headache.  Once it was less severe than about 15 minutes I gave her the third occipital nerve block.     Last visit-patient states that after the nerve block her occipital headaches remained well controlled for 4 weeks but she started having them again in the last 2 weeks with tenderness around her ears.  In fact she has difficulty laying on her side due to the pain.  For her neck pain she is going to have another rhizotomy next year.  She is still getting about 1 migraine headache each week for which she takes Zofran and Maxalt.  But last month she had a 3-day long headache.  Continues to take Topamax 100 mg twice a day.  Second occipital nerve block given today.    Last visit-since the last visit patient initially increased her Topamax to 75 mg twice daily but noticed no improvement in her headaches therefore a few weeks ago she increased it to 100 mg twice a day. She states that her migraine headaches have improved with Topamax but she continues to have the headaches in the back of her head and neck that radiate up on both sides around her ears and occasionally to her jaw. Therefore I gave her her first occipital nerve block today    Initial njqad-49-qrop-old female with a history of Kimberly-Danlos syndrome, hypertension, fibromyalgia, arthritis status post bilateral shoulder and left hip surgery, anxiety, depression, back pain status post L5/S1 surgery presents with headaches.  As per patient she has had  headaches for over 25 years.  She would previously get blurred/tunnel vision 30 minutes before the headache started but now she sees red and black images.  The headaches are usually on one side, throbbing in nature with nausea, photophobia, phonophobia and ringing in her years.  She takes Maxalt and Zofran for them which helps.  Was also started on Topamax 50 mg twice a day about 5 years ago.  She currently has a severe headache about once a week lasting for a day a few dull headaches each week in the back of her head and neck which she attributes to arthritis neck.  Has had several bouts of PT which have helped some.  Is also on a muscle relaxer in addition to oxycodone for arthritis now.  Avoids taking NSAIDs as she has had a GI bleed before.  Reports to having sleep disturbances as well as she gets up every 2 hours.  She had a CT head in 2017 that was unremarkable.  Blood work done recently showed a normal B12, normal vitamin D level and TSH.  ESR and CRP were also normal.  LDL was elevated at 138 and she is trying to make dietary modifications      The following portions of the patient's history were reviewed today and updated as of 08/31/2021  : allergies, current medications, past family history, past medical history, past social history, past surgical history and problem list  These document will be scanned to patient's chart.      Current Outpatient Medications:     ALPRAZolam XR (XANAX XR) 0.5 MG 24 hr tablet, Take 1 tablet by mouth As Needed., Disp: , Rfl:     amLODIPine (NORVASC) 5 MG tablet, , Disp: , Rfl:     buPROPion XL (WELLBUTRIN XL) 300 MG 24 hr tablet, Take 1 tablet by mouth Daily., Disp: , Rfl:     cyanocobalamin 1000 MCG/ML injection, 1 mL Every 28 (Twenty-Eight) Days., Disp: , Rfl:     cyclobenzaprine (FLEXERIL) 10 MG tablet, Take 1 tablet 3 times a day by oral route for 30 days., Disp: , Rfl:     diphenhydrAMINE (BENADRYL) 25 mg capsule, Take 1 capsule by mouth At Night As Needed for Itching.,  Disp: , Rfl:     esomeprazole (nexIUM) 40 MG capsule, Take 1 capsule by mouth 2 (Two) Times a Day., Disp: 60 capsule, Rfl: 11    estradiol (ESTRACE) 0.1 MG/GM vaginal cream, , Disp: , Rfl:     hydroxychloroquine (PLAQUENIL) 200 MG tablet, , Disp: , Rfl:     lisinopril (PRINIVIL,ZESTRIL) 10 MG tablet, , Disp: , Rfl:     Magnesium 500 MG capsule, Take 1 tablet by mouth Daily., Disp: , Rfl:     meloxicam (MOBIC) 15 MG tablet, meloxicam 15 mg tablet  Take 1 tablet every day by oral route., Disp: , Rfl:     montelukast (SINGULAIR) 10 MG tablet, , Disp: , Rfl:     Multiple Vitamins-Minerals (MULTIVITAMIN WITH MINERALS) tablet tablet, Take 1 tablet by mouth Daily., Disp: , Rfl:     ondansetron ODT (ZOFRAN-ODT) 8 MG disintegrating tablet, Take 1 tablet by mouth Every 8 (Eight) Hours As Needed for nausea or vomiting., Disp: 16 tablet, Rfl: 0    oxyCODONE-acetaminophen (PERCOCET) 7.5-325 MG per tablet, Take 1 tablet by mouth Every 6 (Six) Hours As Needed., Disp: , Rfl:     Polyethylene Glycol 3350 (MIRALAX PO), Take  by mouth As Needed., Disp: , Rfl:     Potassium Gluconate 550 MG tablet, 4 90mg tablets a day., Disp: , Rfl:     rizatriptan (Maxalt) 10 MG tablet, Take 1 tablet at onset of headache.  May repeat once in 2 hours.  Do not take more than 2 tabs a day or 8 tabs a month, Disp: 8 tablet, Rfl: 3    tiZANidine (ZANAFLEX) 2 MG tablet, Every 6 (Six) Hours., Disp: , Rfl:     traZODone (DESYREL) 100 MG tablet, , Disp: , Rfl:     Wheat Dextrin (BENEFIBER DRINK MIX PO), Benefiber, Disp: , Rfl:     desvenlafaxine (PRISTIQ) 50 MG 24 hr tablet, , Disp: , Rfl:     topiramate (Topamax) 100 MG tablet, Take 1 tablet by mouth 2 (Two) Times a Day., Disp: 60 tablet, Rfl: 5    valsartan (DIOVAN) 160 MG tablet, , Disp: , Rfl:    Past Medical History:   Diagnosis Date    Ankle sprain     Anxiety     Arthritis     Arthritis of back     Arthritis of neck     Bursitis of hip     Cervical disc disorder     Depression     Dislocated elbow      Dislocation of finger     Dislocation, shoulder     Kimberly-Danlos disease     GERD (gastroesophageal reflux disease)     GI bleed     Hip arthrosis     History of transfusion     2 UNITS    Knee swelling     Low back strain     Lumbosacral disc disease     Migraine     Neck strain     Periarthritis of shoulder     Restless legs     Rotator cuff syndrome     Thoracic disc disorder     Wears eyeglasses     Wrist sprain       Past Surgical History:   Procedure Laterality Date    BACK SURGERY      BARIATRIC SURGERY      BREAST BIOPSY Left     CHOLECYSTECTOMY N/A 06/07/2018    Procedure: CHOLECYSTECTOMY LAPAROSCOPIC;  Surgeon: Romana Torre MD;  Location:  [facility];  Service: General    COLONOSCOPY      DILATATION AND CURETTAGE      ENDOSCOPY      GASTRIC BYPASS  2002    HYSTERECTOMY      JOINT REPLACEMENT      Left hip / right shoulder    ROTATOR CUFF REPAIR      bilateral    SHOULDER SURGERY  9/11/2024    Right shoulder replacement    TONSILLECTOMY      TOTAL HIP ARTHROPLASTY Left 10/04/2016    Procedure: TOTAL HIP ARTHROPLASTY LEFT;  Surgeon: Arvind Oh MD;  Location:  [facility];  Service:     TOTAL HIP ARTHROPLASTY Left     TOTAL SHOULDER REVERSE ARTHROPLASTY Right 09/2024    TRIGGER POINT INJECTION      UPPER GASTROINTESTINAL ENDOSCOPY        Family History   Problem Relation Age of Onset    Colon polyps Mother     Dementia Mother     Lung cancer Mother     Cancer Mother         Thyroid, lung, skin    Dislocations Mother     Severe sprains Mother     Heart failure Father     Breast cancer Maternal Grandmother 50    Breast cancer Maternal Aunt     Esophageal cancer Maternal Grandfather     Ovarian cancer Neg Hx     Colon cancer Neg Hx       Social History     Socioeconomic History    Marital status:    Tobacco Use    Smoking status: Never     Passive exposure: Never    Smokeless tobacco: Never   Vaping Use    Vaping status: Never Used   Substance and Sexual Activity    Alcohol use: No    Drug  "use: No    Sexual activity: Yes     Partners: Male     Birth control/protection: Post-menopausal, Hysterectomy     Review of Systems   Musculoskeletal:  Positive for arthralgias and back pain.   Neurological:  Positive for headache.   All other systems reviewed and are negative.      Objective:    /76 (BP Location: Left arm, Patient Position: Sitting, Cuff Size: Adult)   Pulse 98   Resp 18   Ht 165.1 cm (65\")   Wt 70.3 kg (155 lb)   SpO2 100%   BMI 25.79 kg/m²     Neurology Exam:    General apperance: NAD.  Tenderness to palpation of the occipital regions bilaterally    Mental status: Alert, awake and oriented to time place and person.    Recent and Remote memory: Intact.    Attention span and Concentration: Normal.     Language and Speech: Intact- No dysarthria.    Fluency, Naming , Repitition and Comprehension:  Intact    Cranial Nerves:   CN II: Visual fields are full. Intact. Fundi - Normal, No papillederma, Pupils - PUSHPA  CN III, IV and VI: Extraocular movements are intact. Normal saccades.   CN V: Facial sensation is intact.   CN VII: Muscles of facial expression reveal no asymmetry. Intact.   CN VIII: Hearing is intact. Whispered voice intact.   CN IX and X: Palate elevates symmetrically. Intact  CN XI: Shoulder shrug is intact.   CN XII: Tongue is midline without evidence of atrophy or fasciculation.     Ophthalmoscopic exam of optic disc-normal    Motor:  Right UE muscle strength 5/5. Normal tone.     Left UE muscle strength 5/5. Normal tone.      Right LE muscle strength5/5.  4/5 DF. Normal tone. Wears bilateral AFOs for foot drops    Left LE muscle strength 5/5.4/5 DF . Normal tone.      Sensory: Normal light touch, vibration and pinprick sensation bilaterally.    DTRs: 2+ bilaterally in upper and lower extremities.    Babinski: Negative bilaterally.    Co-ordination: Normal finger-to-nose, heel to shin B/L.    Rhomberg: Negative.    Gait: Slightly broad-based    Cardiovascular: Regular rate " and rhythm without murmur, gallop or rub.    Assessment and Plan:  1. Migraine with aura and without status migrainosus, not intractable  Patient has a combination of migraine and cervico-occipital headaches.  High blood pressure could also be causing her headaches  She could also be having rebound headaches from her Percocet and I have told her to cut back to at least twice a day  I will increase her Topamax to 100 mg twice daily.  But if the headaches do not improve then I may schedule her for an occipital nerve block  For abortive treatment of her headaches I have given her samples of Ubrelvy to try as Maxalt is not helping  She should also keep her self well-hydrated while on Topamax  Of note-for her high blood pressure she will start new medications today       Return in about 2 months (around 5/18/2025).     I spent 25 minutes out of it 20 minutes was spent face-to-face    Thea Montiel MD

## 2025-04-09 ENCOUNTER — TELEPHONE (OUTPATIENT)
Dept: NEUROLOGY | Facility: CLINIC | Age: 60
End: 2025-04-09
Payer: MEDICARE

## 2025-04-09 NOTE — TELEPHONE ENCOUNTER
PT WAS TOLD TO CALL  BACK PER DR RUIZ     Caller: LARS Ma call back number: [phone]     What was the call regarding: PT WAS TOLD TO GIVE UPDATE ON RX  SAID UBRELVY IS WORKING GREAT / TOPAMAX SEEMS TO HELP  AS WELL     Is it okay if the provider responds through MyChart: CALL     PT WILL NEED A RX OF UBRELVY  DONT SEE ON CHART THINK WAS GIVEN SAMPLES  PLEASE SEND TO     [facility] - [address] - [phone]  - [phone]  [phone]     PT ALSO SAID WAS TO POSSIBLE START INJ? CAN YOU CHECK TO SEE IF NEED P.A FROM INSURANCE SO PT CAN START SOON? OR AS SOON AS DR RUIZ APPROVES

## 2025-04-10 ENCOUNTER — SPECIALTY PHARMACY (OUTPATIENT)
Dept: GENERAL RADIOLOGY | Facility: HOSPITAL | Age: 60
End: 2025-04-10
Payer: MEDICARE

## 2025-05-20 ENCOUNTER — OFFICE VISIT (OUTPATIENT)
Dept: NEUROLOGY | Facility: CLINIC | Age: 60
End: 2025-05-20
Payer: MEDICARE

## 2025-05-20 VITALS
BODY MASS INDEX: 25.46 KG/M2 | WEIGHT: 153 LBS | DIASTOLIC BLOOD PRESSURE: 90 MMHG | HEART RATE: 78 BPM | SYSTOLIC BLOOD PRESSURE: 126 MMHG | OXYGEN SATURATION: 98 %

## 2025-05-20 DIAGNOSIS — M54.81 BILATERAL OCCIPITAL NEURALGIA: Primary | ICD-10-CM

## 2025-05-20 RX ORDER — DESVENLAFAXINE 25 MG/1
25 TABLET, EXTENDED RELEASE ORAL DAILY
COMMUNITY
Start: 2025-05-09

## 2025-05-20 NOTE — PROGRESS NOTES
Procedure note for occipital nerve block-The patient's chart was reviewed.  After obtaining verbal consent the patient was placed in a sitting position with her neck flexed and her chin touching her chest.  Both the left and right occipital areas were prepped with antiseptic solution and injected with bupivacaine 0.5 %. 3 cc of bupivacaine  was injected at the site of the greater occipital nerve on each side and  2 cc was injected in the lesser occipital nerve on each side.    Patient tolerated the procedure well.    Of note-patient states that she increased her dose of Topamax to 100 mg twice daily but she has continued to have about 4-5 headaches a month which she attributes to pressure/weather changes.  The headaches are mainly in the back of her head radiating to about her ear.  She takes Maxalt as needed.  Her blood pressure is better controlled since she went back on lisinopril despite her cough as valsartan did not help.  For abortive treatment of her headaches she tried Ubrelvy samples which helped but her co-pay was extremely high.  We will enroll her in the free drug program.

## 2025-05-28 ENCOUNTER — SPECIALTY PHARMACY (OUTPATIENT)
Dept: NEUROLOGY | Facility: CLINIC | Age: 60
End: 2025-05-28
Payer: MEDICARE

## 2025-06-25 ENCOUNTER — SPECIALTY PHARMACY (OUTPATIENT)
Dept: NEUROLOGY | Facility: CLINIC | Age: 60
End: 2025-06-25
Payer: MEDICARE

## 2025-07-21 ENCOUNTER — SPECIALTY PHARMACY (OUTPATIENT)
Dept: NEUROLOGY | Facility: CLINIC | Age: 60
End: 2025-07-21
Payer: MEDICARE

## 2025-07-25 ENCOUNTER — CLINICAL SUPPORT (OUTPATIENT)
Dept: NEUROLOGY | Facility: CLINIC | Age: 60
End: 2025-07-25
Payer: MEDICARE

## 2025-07-25 ENCOUNTER — SPECIALTY PHARMACY (OUTPATIENT)
Dept: NEUROLOGY | Facility: CLINIC | Age: 60
End: 2025-07-25
Payer: MEDICARE

## 2025-07-25 VITALS
SYSTOLIC BLOOD PRESSURE: 128 MMHG | WEIGHT: 153 LBS | HEIGHT: 65 IN | BODY MASS INDEX: 25.49 KG/M2 | DIASTOLIC BLOOD PRESSURE: 92 MMHG

## 2025-07-25 DIAGNOSIS — M54.81 BILATERAL OCCIPITAL NEURALGIA: Primary | ICD-10-CM

## 2025-07-25 RX ORDER — TOPIRAMATE 100 MG/1
100 TABLET, FILM COATED ORAL 2 TIMES DAILY
Qty: 60 TABLET | Refills: 5 | Status: SHIPPED | OUTPATIENT
Start: 2025-07-25

## 2025-07-25 NOTE — PROGRESS NOTES
Procedure note for occipital nerve block-The patient's chart was reviewed.  After obtaining verbal consent the patient was placed in a sitting position with her neck flexed and her chin touching her chest.  Both the left and right occipital areas were prepped with antiseptic solution and injected with bupivacaine 0.5 %. 3 cc of bupivacaine  was injected at the site of the greater occipital nerve on each side and  2 cc was injected in the lesser occipital nerve on each side.    Patient tolerated the procedure well.    Of note-her headaches were well-controlled up until the past few weeks.  She continues to take Topamax 100 mg twice daily and also about Ubrelvy to the free drug program which has helped tremendously with the headaches.

## (undated) DEVICE — ENDOPATH XCEL BLADELESS TROCARS WITH STABILITY SLEEVES: Brand: ENDOPATH XCEL

## (undated) DEVICE — ENDOPATH XCEL BLUNT TIP TROCARS WITH SMOOTH SLEEVES: Brand: ENDOPATH XCEL

## (undated) DEVICE — FLTR HME STR UNIV W/SMPL PORT

## (undated) DEVICE — SUT MNCRYL PLS ANTIB UD 4/0 PS2 18IN

## (undated) DEVICE — GLV SURG TRIUMPH ORTHO W/ALOE PF LTX 7.5 STRL

## (undated) DEVICE — AIRWY 90MM NO9

## (undated) DEVICE — CANNULA,OXY,ADULT,SUPERSOFT,W/7'TUB,UC: Brand: MEDLINE

## (undated) DEVICE — ADHS LIQ MASTISOL 2/3ML

## (undated) DEVICE — MEDI-VAC YANKAUER SUCTION HANDLE W/BULBOUS TIP: Brand: CARDINAL HEALTH

## (undated) DEVICE — 3M(TM) TEGADERM(TM) TRANSPARENT FILM DRESSING FRAME STYLE 1622W: Brand: 3M™ TEGADERM™

## (undated) DEVICE — ENDOPOUCH RETRIEVER SPECIMEN RETRIEVAL BAGS: Brand: ENDOPOUCH RETRIEVER

## (undated) DEVICE — PK LAP LASR CHOLE 10

## (undated) DEVICE — 3M™ STERI-STRIP™ REINFORCED ADHESIVE SKIN CLOSURES, R1546, 1/4 IN X 4 IN (6 MM X 100 MM), 10 STRIPS/ENVELOPE: Brand: 3M™ STERI-STRIP™

## (undated) DEVICE — MEDI-VAC NON-CONDUCTIVE SUCTION TUBING: Brand: CARDINAL HEALTH

## (undated) DEVICE — ENDOCUT SCISSOR TIP, DISPOSABLE: Brand: RENEW

## (undated) DEVICE — ENDOPATH XCEL UNIVERSAL TROCAR STABLILITY SLEEVES: Brand: ENDOPATH XCEL

## (undated) DEVICE — [HIGH FLOW HEATED INSUFFLATOR TUBING,  DO NOT USE IF PACKAGE IS DAMAGED]

## (undated) DEVICE — NON-ADHERENT PAD: Brand: TELFA

## (undated) DEVICE — ANTIBACTERIAL UNDYED BRAIDED (POLYGLACTIN 910), SYNTHETIC ABSORBABLE SURGICAL SUTURE: Brand: COATED VICRYL

## (undated) DEVICE — COVER,LIGHT HANDLE,FLX,1/PK: Brand: MEDLINE INDUSTRIES, INC.